# Patient Record
Sex: FEMALE | Race: WHITE | URBAN - METROPOLITAN AREA
[De-identification: names, ages, dates, MRNs, and addresses within clinical notes are randomized per-mention and may not be internally consistent; named-entity substitution may affect disease eponyms.]

---

## 2017-03-07 ENCOUNTER — OUTPATIENT (OUTPATIENT)
Dept: OUTPATIENT SERVICES | Facility: HOSPITAL | Age: 57
LOS: 1 days | Discharge: HOME | End: 2017-03-07

## 2017-03-07 ENCOUNTER — OUTPATIENT (OUTPATIENT)
Dept: OUTPATIENT SERVICES | Facility: HOSPITAL | Age: 57
LOS: 1 days | Discharge: HOME | End: 2017-03-07
Payer: COMMERCIAL

## 2017-03-07 VITALS
WEIGHT: 140.21 LBS | OXYGEN SATURATION: 100 % | SYSTOLIC BLOOD PRESSURE: 129 MMHG | RESPIRATION RATE: 16 BRPM | HEART RATE: 65 BPM | TEMPERATURE: 98 F | HEIGHT: 66 IN | DIASTOLIC BLOOD PRESSURE: 89 MMHG

## 2017-03-07 DIAGNOSIS — Z96.641 PRESENCE OF RIGHT ARTIFICIAL HIP JOINT: Chronic | ICD-10-CM

## 2017-03-07 DIAGNOSIS — M43.16 SPONDYLOLISTHESIS, LUMBAR REGION: ICD-10-CM

## 2017-03-07 DIAGNOSIS — M54.16 RADICULOPATHY, LUMBAR REGION: ICD-10-CM

## 2017-03-07 DIAGNOSIS — Z98.890 OTHER SPECIFIED POSTPROCEDURAL STATES: Chronic | ICD-10-CM

## 2017-03-07 DIAGNOSIS — Z98.1 ARTHRODESIS STATUS: Chronic | ICD-10-CM

## 2017-03-07 DIAGNOSIS — M48.06 SPINAL STENOSIS, LUMBAR REGION: ICD-10-CM

## 2017-03-07 LAB
ANION GAP SERPL CALC-SCNC: 8 MMOL/L — SIGNIFICANT CHANGE UP (ref 5–17)
APPEARANCE UR: CLEAR — SIGNIFICANT CHANGE UP
APTT BLD: 31.7 SEC — SIGNIFICANT CHANGE UP (ref 27.5–37.4)
BASOPHILS # BLD AUTO: 0.1 K/UL — SIGNIFICANT CHANGE UP (ref 0–0.2)
BASOPHILS NFR BLD AUTO: 1.2 % — SIGNIFICANT CHANGE UP (ref 0–2)
BILIRUB UR-MCNC: NEGATIVE — SIGNIFICANT CHANGE UP
BLD GP AB SCN SERPL QL: SIGNIFICANT CHANGE UP
BUN SERPL-MCNC: 12 MG/DL — SIGNIFICANT CHANGE UP (ref 7–23)
CALCIUM SERPL-MCNC: 8.7 MG/DL — SIGNIFICANT CHANGE UP (ref 8.5–10.1)
CHLORIDE SERPL-SCNC: 104 MMOL/L — SIGNIFICANT CHANGE UP (ref 96–108)
CO2 SERPL-SCNC: 29 MMOL/L — SIGNIFICANT CHANGE UP (ref 22–31)
COLOR SPEC: YELLOW — SIGNIFICANT CHANGE UP
CREAT SERPL-MCNC: 0.68 MG/DL — SIGNIFICANT CHANGE UP (ref 0.5–1.3)
DIFF PNL FLD: NEGATIVE — SIGNIFICANT CHANGE UP
EOSINOPHIL # BLD AUTO: 0.1 K/UL — SIGNIFICANT CHANGE UP (ref 0–0.5)
EOSINOPHIL NFR BLD AUTO: 1.7 % — SIGNIFICANT CHANGE UP (ref 0–6)
GLUCOSE SERPL-MCNC: 88 MG/DL — SIGNIFICANT CHANGE UP (ref 70–99)
GLUCOSE UR QL: NEGATIVE MG/DL — SIGNIFICANT CHANGE UP
HCT VFR BLD CALC: 38.7 % — SIGNIFICANT CHANGE UP (ref 34.5–45)
HGB BLD-MCNC: 13.7 G/DL — SIGNIFICANT CHANGE UP (ref 11.5–15.5)
INR BLD: 0.99 RATIO — SIGNIFICANT CHANGE UP (ref 0.88–1.16)
KETONES UR-MCNC: NEGATIVE — SIGNIFICANT CHANGE UP
LEUKOCYTE ESTERASE UR-ACNC: NEGATIVE — SIGNIFICANT CHANGE UP
LYMPHOCYTES # BLD AUTO: 2.6 K/UL — SIGNIFICANT CHANGE UP (ref 1–3.3)
LYMPHOCYTES # BLD AUTO: 37 % — SIGNIFICANT CHANGE UP (ref 13–44)
MCHC RBC-ENTMCNC: 32.8 PG — SIGNIFICANT CHANGE UP (ref 27–34)
MCHC RBC-ENTMCNC: 35.3 GM/DL — SIGNIFICANT CHANGE UP (ref 32–36)
MCV RBC AUTO: 93.1 FL — SIGNIFICANT CHANGE UP (ref 80–100)
MONOCYTES # BLD AUTO: 0.6 K/UL — SIGNIFICANT CHANGE UP (ref 0–0.9)
MONOCYTES NFR BLD AUTO: 8.2 % — SIGNIFICANT CHANGE UP (ref 2–14)
MRSA PCR RESULT.: SIGNIFICANT CHANGE UP
NEUTROPHILS # BLD AUTO: 3.6 K/UL — SIGNIFICANT CHANGE UP (ref 1.8–7.4)
NEUTROPHILS NFR BLD AUTO: 51.8 % — SIGNIFICANT CHANGE UP (ref 43–77)
NITRITE UR-MCNC: NEGATIVE — SIGNIFICANT CHANGE UP
PH UR: 5 — SIGNIFICANT CHANGE UP (ref 4.8–8)
PLATELET # BLD AUTO: 318 K/UL — SIGNIFICANT CHANGE UP (ref 150–400)
POTASSIUM SERPL-MCNC: 3.9 MMOL/L — SIGNIFICANT CHANGE UP (ref 3.5–5.3)
POTASSIUM SERPL-SCNC: 3.9 MMOL/L — SIGNIFICANT CHANGE UP (ref 3.5–5.3)
PROT UR-MCNC: NEGATIVE MG/DL — SIGNIFICANT CHANGE UP
PROTHROM AB SERPL-ACNC: 10.9 SEC — SIGNIFICANT CHANGE UP (ref 10–13.1)
RBC # BLD: 4.16 M/UL — SIGNIFICANT CHANGE UP (ref 3.8–5.2)
RBC # FLD: 11.8 % — SIGNIFICANT CHANGE UP (ref 10.3–14.5)
S AUREUS DNA NOSE QL NAA+PROBE: SIGNIFICANT CHANGE UP
SODIUM SERPL-SCNC: 141 MMOL/L — SIGNIFICANT CHANGE UP (ref 135–145)
SP GR SPEC: 1.01 — SIGNIFICANT CHANGE UP (ref 1.01–1.02)
TYPE + AB SCN PNL BLD: SIGNIFICANT CHANGE UP
UROBILINOGEN FLD QL: NEGATIVE MG/DL — SIGNIFICANT CHANGE UP
WBC # BLD: 7 K/UL — SIGNIFICANT CHANGE UP (ref 3.8–10.5)
WBC # FLD AUTO: 7 K/UL — SIGNIFICANT CHANGE UP (ref 3.8–10.5)

## 2017-03-07 PROCEDURE — 93010 ELECTROCARDIOGRAM REPORT: CPT

## 2017-03-07 NOTE — H&P PST ADULT - HISTORY OF PRESENT ILLNESS
This is a 58 y/o female with no PMH. Pt has had back for "years" she denies any trauma to her back. She has had back surgery in the past (9/2012 and 5/2013) with good results. Has been able to resume jogging and her active life. But in November 2016 she started experiencing back and Right sciatica pain and Left groin pain. Pain radiates to B/L LE and she reports leg cramping in both LEs. Pain at its worst is 7/10, at present is 5/10. She uses tramadol and oxycodone with success. Denies any numbness and tingling

## 2017-03-07 NOTE — H&P PST ADULT - ASSESSMENT
This is a 56 y/o female with lower back pain who is scheduled for a Laminectomy fusion L 3 -L4 with Dr. Pinon    Plan: Patient instructed on     1. NPO post midnight of surgery  2. On the use of EZ sponges  3. Patient instructed to use Mupirocin  4. Patient aware that she needs medical clearance prior to surgery

## 2017-03-10 RX ORDER — CELECOXIB 200 MG/1
200 CAPSULE ORAL ONCE
Qty: 0 | Refills: 0 | Status: COMPLETED | OUTPATIENT
Start: 2017-03-13 | End: 2017-03-13

## 2017-03-10 RX ORDER — HYDROMORPHONE HYDROCHLORIDE 2 MG/ML
30 INJECTION INTRAMUSCULAR; INTRAVENOUS; SUBCUTANEOUS
Qty: 0 | Refills: 0 | Status: DISCONTINUED | OUTPATIENT
Start: 2017-03-13 | End: 2017-03-15

## 2017-03-10 RX ORDER — NALOXONE HYDROCHLORIDE 4 MG/.1ML
0.1 SPRAY NASAL
Qty: 0 | Refills: 0 | Status: DISCONTINUED | OUTPATIENT
Start: 2017-03-13 | End: 2017-03-17

## 2017-03-10 RX ORDER — ONDANSETRON 8 MG/1
4 TABLET, FILM COATED ORAL EVERY 6 HOURS
Qty: 0 | Refills: 0 | Status: DISCONTINUED | OUTPATIENT
Start: 2017-03-13 | End: 2017-03-17

## 2017-03-10 RX ORDER — SODIUM CHLORIDE 9 MG/ML
1000 INJECTION, SOLUTION INTRAVENOUS
Qty: 0 | Refills: 0 | Status: DISCONTINUED | OUTPATIENT
Start: 2017-03-13 | End: 2017-03-17

## 2017-03-10 RX ORDER — SODIUM CHLORIDE 9 MG/ML
3 INJECTION INTRAMUSCULAR; INTRAVENOUS; SUBCUTANEOUS EVERY 8 HOURS
Qty: 0 | Refills: 0 | Status: DISCONTINUED | OUTPATIENT
Start: 2017-03-17 | End: 2017-03-17

## 2017-03-10 RX ORDER — HYDROMORPHONE HYDROCHLORIDE 2 MG/ML
0.5 INJECTION INTRAMUSCULAR; INTRAVENOUS; SUBCUTANEOUS
Qty: 0 | Refills: 0 | Status: DISCONTINUED | OUTPATIENT
Start: 2017-03-13 | End: 2017-03-17

## 2017-03-10 RX ORDER — OXYCODONE HYDROCHLORIDE 5 MG/1
10 TABLET ORAL ONCE
Qty: 0 | Refills: 0 | Status: DISCONTINUED | OUTPATIENT
Start: 2017-03-13 | End: 2017-03-13

## 2017-03-10 RX ORDER — ACETAMINOPHEN 500 MG
975 TABLET ORAL ONCE
Qty: 0 | Refills: 0 | Status: COMPLETED | OUTPATIENT
Start: 2017-03-13 | End: 2017-03-13

## 2017-03-12 ENCOUNTER — RESULT REVIEW (OUTPATIENT)
Age: 57
End: 2017-03-12

## 2017-03-13 ENCOUNTER — INPATIENT (INPATIENT)
Facility: HOSPITAL | Age: 57
LOS: 3 days | Discharge: HOME | End: 2017-03-17
Attending: ORTHOPAEDIC SURGERY | Admitting: NEUROLOGICAL SURGERY
Payer: COMMERCIAL

## 2017-03-13 VITALS
HEIGHT: 66 IN | TEMPERATURE: 98 F | DIASTOLIC BLOOD PRESSURE: 92 MMHG | OXYGEN SATURATION: 100 % | SYSTOLIC BLOOD PRESSURE: 145 MMHG | HEART RATE: 60 BPM | RESPIRATION RATE: 16 BRPM | WEIGHT: 141.32 LBS

## 2017-03-13 DIAGNOSIS — Z98.890 OTHER SPECIFIED POSTPROCEDURAL STATES: Chronic | ICD-10-CM

## 2017-03-13 DIAGNOSIS — Z98.1 ARTHRODESIS STATUS: Chronic | ICD-10-CM

## 2017-03-13 DIAGNOSIS — Z96.641 PRESENCE OF RIGHT ARTIFICIAL HIP JOINT: Chronic | ICD-10-CM

## 2017-03-13 LAB
ANION GAP SERPL CALC-SCNC: 8 MMOL/L — SIGNIFICANT CHANGE UP (ref 5–17)
BASOPHILS # BLD AUTO: 0 K/UL — SIGNIFICANT CHANGE UP (ref 0–0.2)
BASOPHILS NFR BLD AUTO: 0.4 % — SIGNIFICANT CHANGE UP (ref 0–2)
BUN SERPL-MCNC: 11 MG/DL — SIGNIFICANT CHANGE UP (ref 7–23)
CALCIUM SERPL-MCNC: 8.7 MG/DL — SIGNIFICANT CHANGE UP (ref 8.5–10.1)
CHLORIDE SERPL-SCNC: 106 MMOL/L — SIGNIFICANT CHANGE UP (ref 96–108)
CO2 SERPL-SCNC: 28 MMOL/L — SIGNIFICANT CHANGE UP (ref 22–31)
CREAT SERPL-MCNC: 0.78 MG/DL — SIGNIFICANT CHANGE UP (ref 0.5–1.3)
EOSINOPHIL # BLD AUTO: 0 K/UL — SIGNIFICANT CHANGE UP (ref 0–0.5)
EOSINOPHIL NFR BLD AUTO: 0.4 % — SIGNIFICANT CHANGE UP (ref 0–6)
GLUCOSE SERPL-MCNC: 144 MG/DL — HIGH (ref 70–99)
HCT VFR BLD CALC: 37.7 % — SIGNIFICANT CHANGE UP (ref 34.5–45)
HGB BLD-MCNC: 12.8 G/DL — SIGNIFICANT CHANGE UP (ref 11.5–15.5)
LYMPHOCYTES # BLD AUTO: 1.1 K/UL — SIGNIFICANT CHANGE UP (ref 1–3.3)
LYMPHOCYTES # BLD AUTO: 10.7 % — LOW (ref 13–44)
MCHC RBC-ENTMCNC: 31.8 PG — SIGNIFICANT CHANGE UP (ref 27–34)
MCHC RBC-ENTMCNC: 34 GM/DL — SIGNIFICANT CHANGE UP (ref 32–36)
MCV RBC AUTO: 93.5 FL — SIGNIFICANT CHANGE UP (ref 80–100)
MONOCYTES # BLD AUTO: 0.3 K/UL — SIGNIFICANT CHANGE UP (ref 0–0.9)
MONOCYTES NFR BLD AUTO: 2.7 % — SIGNIFICANT CHANGE UP (ref 2–14)
NEUTROPHILS # BLD AUTO: 8.7 K/UL — HIGH (ref 1.8–7.4)
NEUTROPHILS NFR BLD AUTO: 85.8 % — HIGH (ref 43–77)
PLATELET # BLD AUTO: 324 K/UL — SIGNIFICANT CHANGE UP (ref 150–400)
POTASSIUM SERPL-MCNC: 4.3 MMOL/L — SIGNIFICANT CHANGE UP (ref 3.5–5.3)
POTASSIUM SERPL-SCNC: 4.3 MMOL/L — SIGNIFICANT CHANGE UP (ref 3.5–5.3)
RBC # BLD: 4.04 M/UL — SIGNIFICANT CHANGE UP (ref 3.8–5.2)
RBC # FLD: 11.9 % — SIGNIFICANT CHANGE UP (ref 10.3–14.5)
SODIUM SERPL-SCNC: 142 MMOL/L — SIGNIFICANT CHANGE UP (ref 135–145)
WBC # BLD: 10.2 K/UL — SIGNIFICANT CHANGE UP (ref 3.8–10.5)
WBC # FLD AUTO: 10.2 K/UL — SIGNIFICANT CHANGE UP (ref 3.8–10.5)

## 2017-03-13 PROCEDURE — 88304 TISSUE EXAM BY PATHOLOGIST: CPT | Mod: 26

## 2017-03-13 RX ORDER — INFLUENZA VIRUS VACCINE 15; 15; 15; 15 UG/.5ML; UG/.5ML; UG/.5ML; UG/.5ML
0.5 SUSPENSION INTRAMUSCULAR ONCE
Qty: 0 | Refills: 0 | Status: DISCONTINUED | OUTPATIENT
Start: 2017-03-13 | End: 2017-03-17

## 2017-03-13 RX ORDER — HYDROMORPHONE HYDROCHLORIDE 2 MG/ML
1 INJECTION INTRAMUSCULAR; INTRAVENOUS; SUBCUTANEOUS EVERY 6 HOURS
Qty: 0 | Refills: 0 | Status: DISCONTINUED | OUTPATIENT
Start: 2017-03-13 | End: 2017-03-17

## 2017-03-13 RX ORDER — ACETAMINOPHEN 500 MG
650 TABLET ORAL EVERY 6 HOURS
Qty: 0 | Refills: 0 | Status: DISCONTINUED | OUTPATIENT
Start: 2017-03-13 | End: 2017-03-17

## 2017-03-13 RX ORDER — HYDROMORPHONE HYDROCHLORIDE 2 MG/ML
0.5 INJECTION INTRAMUSCULAR; INTRAVENOUS; SUBCUTANEOUS
Qty: 0 | Refills: 0 | Status: DISCONTINUED | OUTPATIENT
Start: 2017-03-13 | End: 2017-03-17

## 2017-03-13 RX ORDER — HYDROMORPHONE HYDROCHLORIDE 2 MG/ML
0.5 INJECTION INTRAMUSCULAR; INTRAVENOUS; SUBCUTANEOUS
Qty: 0 | Refills: 0 | Status: DISCONTINUED | OUTPATIENT
Start: 2017-03-13 | End: 2017-03-15

## 2017-03-13 RX ORDER — SODIUM CHLORIDE 9 MG/ML
1000 INJECTION, SOLUTION INTRAVENOUS
Qty: 0 | Refills: 0 | Status: DISCONTINUED | OUTPATIENT
Start: 2017-03-13 | End: 2017-03-17

## 2017-03-13 RX ORDER — DOCUSATE SODIUM 100 MG
100 CAPSULE ORAL THREE TIMES A DAY
Qty: 0 | Refills: 0 | Status: DISCONTINUED | OUTPATIENT
Start: 2017-03-13 | End: 2017-03-17

## 2017-03-13 RX ORDER — ONDANSETRON 8 MG/1
4 TABLET, FILM COATED ORAL EVERY 4 HOURS
Qty: 0 | Refills: 0 | Status: DISCONTINUED | OUTPATIENT
Start: 2017-03-13 | End: 2017-03-17

## 2017-03-13 RX ORDER — MUPIROCIN 20 MG/G
1 OINTMENT TOPICAL
Qty: 0 | Refills: 0 | Status: DISCONTINUED | OUTPATIENT
Start: 2017-03-13 | End: 2017-03-17

## 2017-03-13 RX ORDER — CYCLOBENZAPRINE HYDROCHLORIDE 10 MG/1
10 TABLET, FILM COATED ORAL EVERY 8 HOURS
Qty: 0 | Refills: 0 | Status: DISCONTINUED | OUTPATIENT
Start: 2017-03-13 | End: 2017-03-17

## 2017-03-13 RX ADMIN — Medication 100 MILLIGRAM(S): at 14:33

## 2017-03-13 RX ADMIN — OXYCODONE HYDROCHLORIDE 10 MILLIGRAM(S): 5 TABLET ORAL at 07:20

## 2017-03-13 RX ADMIN — SODIUM CHLORIDE 100 MILLILITER(S): 9 INJECTION, SOLUTION INTRAVENOUS at 10:47

## 2017-03-13 RX ADMIN — HYDROMORPHONE HYDROCHLORIDE 30 MILLILITER(S): 2 INJECTION INTRAMUSCULAR; INTRAVENOUS; SUBCUTANEOUS at 10:31

## 2017-03-13 RX ADMIN — Medication 975 MILLIGRAM(S): at 07:19

## 2017-03-13 RX ADMIN — HYDROMORPHONE HYDROCHLORIDE 0.5 MILLIGRAM(S): 2 INJECTION INTRAMUSCULAR; INTRAVENOUS; SUBCUTANEOUS at 10:54

## 2017-03-13 RX ADMIN — Medication 100 MILLIGRAM(S): at 21:35

## 2017-03-13 RX ADMIN — Medication 100 MILLIGRAM(S): at 14:16

## 2017-03-13 RX ADMIN — HYDROMORPHONE HYDROCHLORIDE 0.5 MILLIGRAM(S): 2 INJECTION INTRAMUSCULAR; INTRAVENOUS; SUBCUTANEOUS at 10:38

## 2017-03-13 RX ADMIN — SODIUM CHLORIDE 100 MILLILITER(S): 9 INJECTION, SOLUTION INTRAVENOUS at 21:36

## 2017-03-13 RX ADMIN — CYCLOBENZAPRINE HYDROCHLORIDE 10 MILLIGRAM(S): 10 TABLET, FILM COATED ORAL at 21:35

## 2017-03-13 RX ADMIN — CYCLOBENZAPRINE HYDROCHLORIDE 10 MILLIGRAM(S): 10 TABLET, FILM COATED ORAL at 14:16

## 2017-03-13 RX ADMIN — Medication 100 MILLIGRAM(S): at 20:33

## 2017-03-13 RX ADMIN — CELECOXIB 200 MILLIGRAM(S): 200 CAPSULE ORAL at 07:20

## 2017-03-13 NOTE — PATIENT PROFILE ADULT. - PSH
S/P arthroscopy of right knee  7/2016 and done prior about 10 years ago  S/P cervical discectomy  5/2013  S/P hip replacement, right  2012  S/P lumbar fusion  9/2012

## 2017-03-13 NOTE — BRIEF OPERATIVE NOTE - PROCEDURE
Lumbar fusion of the anterior column, posterior technique  03/13/2017  laminectomy/fusion/PLIF L3/4, exploration of fusion  Active  HDEWAL

## 2017-03-14 DIAGNOSIS — Z29.9 ENCOUNTER FOR PROPHYLACTIC MEASURES, UNSPECIFIED: ICD-10-CM

## 2017-03-14 DIAGNOSIS — I95.9 HYPOTENSION, UNSPECIFIED: ICD-10-CM

## 2017-03-14 DIAGNOSIS — R50.82 POSTPROCEDURAL FEVER: ICD-10-CM

## 2017-03-14 DIAGNOSIS — Z98.1 ARTHRODESIS STATUS: ICD-10-CM

## 2017-03-14 LAB
ANION GAP SERPL CALC-SCNC: 6 MMOL/L — SIGNIFICANT CHANGE UP (ref 5–17)
ANION GAP SERPL CALC-SCNC: 7 MMOL/L — SIGNIFICANT CHANGE UP (ref 5–17)
APPEARANCE UR: CLEAR — SIGNIFICANT CHANGE UP
APTT BLD: 29.5 SEC — SIGNIFICANT CHANGE UP (ref 27.5–37.4)
BACTERIA # UR AUTO: (no result)
BASOPHILS # BLD AUTO: 0.1 K/UL — SIGNIFICANT CHANGE UP (ref 0–0.2)
BASOPHILS NFR BLD AUTO: 0.6 % — SIGNIFICANT CHANGE UP (ref 0–2)
BILIRUB UR-MCNC: NEGATIVE — SIGNIFICANT CHANGE UP
BUN SERPL-MCNC: 6 MG/DL — LOW (ref 7–23)
BUN SERPL-MCNC: 9 MG/DL — SIGNIFICANT CHANGE UP (ref 7–23)
CALCIUM SERPL-MCNC: 7.9 MG/DL — LOW (ref 8.5–10.1)
CALCIUM SERPL-MCNC: 8.3 MG/DL — LOW (ref 8.5–10.1)
CHLORIDE SERPL-SCNC: 104 MMOL/L — SIGNIFICANT CHANGE UP (ref 96–108)
CHLORIDE SERPL-SCNC: 105 MMOL/L — SIGNIFICANT CHANGE UP (ref 96–108)
CK SERPL-CCNC: 391 U/L — HIGH (ref 26–192)
CO2 SERPL-SCNC: 31 MMOL/L — SIGNIFICANT CHANGE UP (ref 22–31)
CO2 SERPL-SCNC: 32 MMOL/L — HIGH (ref 22–31)
COLOR SPEC: YELLOW — SIGNIFICANT CHANGE UP
CREAT SERPL-MCNC: 0.62 MG/DL — SIGNIFICANT CHANGE UP (ref 0.5–1.3)
CREAT SERPL-MCNC: 0.75 MG/DL — SIGNIFICANT CHANGE UP (ref 0.5–1.3)
DIFF PNL FLD: (no result)
EOSINOPHIL # BLD AUTO: 0 K/UL — SIGNIFICANT CHANGE UP (ref 0–0.5)
EOSINOPHIL NFR BLD AUTO: 0.2 % — SIGNIFICANT CHANGE UP (ref 0–6)
EPI CELLS # UR: SIGNIFICANT CHANGE UP
GLUCOSE SERPL-MCNC: 112 MG/DL — HIGH (ref 70–99)
GLUCOSE SERPL-MCNC: 99 MG/DL — SIGNIFICANT CHANGE UP (ref 70–99)
GLUCOSE UR QL: NEGATIVE MG/DL — SIGNIFICANT CHANGE UP
HCT VFR BLD CALC: 31.4 % — LOW (ref 34.5–45)
HCT VFR BLD CALC: 32.3 % — LOW (ref 34.5–45)
HGB BLD-MCNC: 10.7 G/DL — LOW (ref 11.5–15.5)
HGB BLD-MCNC: 10.9 G/DL — LOW (ref 11.5–15.5)
INR BLD: 1.08 RATIO — SIGNIFICANT CHANGE UP (ref 0.88–1.16)
KETONES UR-MCNC: NEGATIVE — SIGNIFICANT CHANGE UP
LEUKOCYTE ESTERASE UR-ACNC: (no result)
LYMPHOCYTES # BLD AUTO: 2.4 K/UL — SIGNIFICANT CHANGE UP (ref 1–3.3)
LYMPHOCYTES # BLD AUTO: 21.3 % — SIGNIFICANT CHANGE UP (ref 13–44)
MAGNESIUM SERPL-MCNC: 1.6 MG/DL — LOW (ref 1.8–2.4)
MCHC RBC-ENTMCNC: 31.3 PG — SIGNIFICANT CHANGE UP (ref 27–34)
MCHC RBC-ENTMCNC: 31.6 PG — SIGNIFICANT CHANGE UP (ref 27–34)
MCHC RBC-ENTMCNC: 33.8 GM/DL — SIGNIFICANT CHANGE UP (ref 32–36)
MCHC RBC-ENTMCNC: 33.9 GM/DL — SIGNIFICANT CHANGE UP (ref 32–36)
MCV RBC AUTO: 92.7 FL — SIGNIFICANT CHANGE UP (ref 80–100)
MCV RBC AUTO: 93.1 FL — SIGNIFICANT CHANGE UP (ref 80–100)
MONOCYTES # BLD AUTO: 1.1 K/UL — HIGH (ref 0–0.9)
MONOCYTES NFR BLD AUTO: 9.5 % — SIGNIFICANT CHANGE UP (ref 2–14)
NEUTROPHILS # BLD AUTO: 7.6 K/UL — HIGH (ref 1.8–7.4)
NEUTROPHILS NFR BLD AUTO: 68.4 % — SIGNIFICANT CHANGE UP (ref 43–77)
NITRITE UR-MCNC: NEGATIVE — SIGNIFICANT CHANGE UP
PH UR: 6 — SIGNIFICANT CHANGE UP (ref 4.8–8)
PLATELET # BLD AUTO: 272 K/UL — SIGNIFICANT CHANGE UP (ref 150–400)
PLATELET # BLD AUTO: 295 K/UL — SIGNIFICANT CHANGE UP (ref 150–400)
POTASSIUM SERPL-MCNC: 3.9 MMOL/L — SIGNIFICANT CHANGE UP (ref 3.5–5.3)
POTASSIUM SERPL-MCNC: 4.4 MMOL/L — SIGNIFICANT CHANGE UP (ref 3.5–5.3)
POTASSIUM SERPL-SCNC: 3.9 MMOL/L — SIGNIFICANT CHANGE UP (ref 3.5–5.3)
POTASSIUM SERPL-SCNC: 4.4 MMOL/L — SIGNIFICANT CHANGE UP (ref 3.5–5.3)
PROT UR-MCNC: NEGATIVE MG/DL — SIGNIFICANT CHANGE UP
PROTHROM AB SERPL-ACNC: 11.9 SEC — SIGNIFICANT CHANGE UP (ref 10–13.1)
RBC # BLD: 3.38 M/UL — LOW (ref 3.8–5.2)
RBC # BLD: 3.48 M/UL — LOW (ref 3.8–5.2)
RBC # FLD: 11.8 % — SIGNIFICANT CHANGE UP (ref 10.3–14.5)
RBC # FLD: 12.1 % — SIGNIFICANT CHANGE UP (ref 10.3–14.5)
RBC CASTS # UR COMP ASSIST: (no result) /HPF (ref 0–4)
SODIUM SERPL-SCNC: 141 MMOL/L — SIGNIFICANT CHANGE UP (ref 135–145)
SODIUM SERPL-SCNC: 144 MMOL/L — SIGNIFICANT CHANGE UP (ref 135–145)
SP GR SPEC: 1.01 — SIGNIFICANT CHANGE UP (ref 1.01–1.02)
TROPONIN I SERPL-MCNC: <0.015 NG/ML — SIGNIFICANT CHANGE UP (ref 0.01–0.04)
UROBILINOGEN FLD QL: 1 MG/DL
WBC # BLD: 11 K/UL — HIGH (ref 3.8–10.5)
WBC # BLD: 11.2 K/UL — HIGH (ref 3.8–10.5)
WBC # FLD AUTO: 11 K/UL — HIGH (ref 3.8–10.5)
WBC # FLD AUTO: 11.2 K/UL — HIGH (ref 3.8–10.5)
WBC UR QL: SIGNIFICANT CHANGE UP

## 2017-03-14 PROCEDURE — 93010 ELECTROCARDIOGRAM REPORT: CPT

## 2017-03-14 PROCEDURE — 71010: CPT | Mod: 26

## 2017-03-14 RX ORDER — MAGNESIUM OXIDE 400 MG ORAL TABLET 241.3 MG
400 TABLET ORAL EVERY 12 HOURS
Qty: 0 | Refills: 0 | Status: COMPLETED | OUTPATIENT
Start: 2017-03-14 | End: 2017-03-16

## 2017-03-14 RX ORDER — IBUPROFEN 200 MG
400 TABLET ORAL ONCE
Qty: 0 | Refills: 0 | Status: COMPLETED | OUTPATIENT
Start: 2017-03-14 | End: 2017-03-14

## 2017-03-14 RX ORDER — SODIUM CHLORIDE 9 MG/ML
500 INJECTION INTRAMUSCULAR; INTRAVENOUS; SUBCUTANEOUS ONCE
Qty: 0 | Refills: 0 | Status: COMPLETED | OUTPATIENT
Start: 2017-03-14 | End: 2017-03-14

## 2017-03-14 RX ADMIN — MUPIROCIN 1 APPLICATION(S): 20 OINTMENT TOPICAL at 05:23

## 2017-03-14 RX ADMIN — MUPIROCIN 1 APPLICATION(S): 20 OINTMENT TOPICAL at 17:53

## 2017-03-14 RX ADMIN — Medication 100 MILLIGRAM(S): at 15:07

## 2017-03-14 RX ADMIN — CYCLOBENZAPRINE HYDROCHLORIDE 10 MILLIGRAM(S): 10 TABLET, FILM COATED ORAL at 05:23

## 2017-03-14 RX ADMIN — Medication 100 MILLIGRAM(S): at 21:25

## 2017-03-14 RX ADMIN — SODIUM CHLORIDE 1500 MILLILITER(S): 9 INJECTION INTRAMUSCULAR; INTRAVENOUS; SUBCUTANEOUS at 09:23

## 2017-03-14 RX ADMIN — Medication 100 MILLIGRAM(S): at 05:22

## 2017-03-14 RX ADMIN — SODIUM CHLORIDE 75 MILLILITER(S): 9 INJECTION, SOLUTION INTRAVENOUS at 05:22

## 2017-03-14 RX ADMIN — HYDROMORPHONE HYDROCHLORIDE 0.5 MILLIGRAM(S): 2 INJECTION INTRAMUSCULAR; INTRAVENOUS; SUBCUTANEOUS at 10:34

## 2017-03-14 RX ADMIN — CYCLOBENZAPRINE HYDROCHLORIDE 10 MILLIGRAM(S): 10 TABLET, FILM COATED ORAL at 15:07

## 2017-03-14 RX ADMIN — Medication 400 MILLIGRAM(S): at 21:25

## 2017-03-14 RX ADMIN — Medication 650 MILLIGRAM(S): at 16:44

## 2017-03-14 RX ADMIN — MAGNESIUM OXIDE 400 MG ORAL TABLET 400 MILLIGRAM(S): 241.3 TABLET ORAL at 22:09

## 2017-03-14 RX ADMIN — Medication 400 MILLIGRAM(S): at 22:09

## 2017-03-14 RX ADMIN — CYCLOBENZAPRINE HYDROCHLORIDE 10 MILLIGRAM(S): 10 TABLET, FILM COATED ORAL at 21:25

## 2017-03-14 NOTE — CONSULT NOTE ADULT - PROBLEM SELECTOR RECOMMENDATION 3
- Likely 2/2 to Dilaudid PCA  - At present BP: 110/65 mm hg, pt is receiving IVF LR  - c/w IVF and Monitor BP

## 2017-03-14 NOTE — CONSULT NOTE ADULT - ATTENDING COMMENTS
I have examined the patient and confirmed the essential components of the history, physical examination, diagnosis, and treatment plan. I agree with the patient's care as documented by the resident and amended herein by me. See resident's note for complete details of service.

## 2017-03-14 NOTE — CONSULT NOTE ADULT - PROBLEM SELECTOR RECOMMENDATION 9
# S/p Laminectomy/fusion/PLIF L3- L4  - Manage as per primary team  - Post Op IV Abx, pain meds, AC as per primary team

## 2017-03-14 NOTE — PROGRESS NOTE ADULT - SUBJECTIVE AND OBJECTIVE BOX
PMD: Dr. Flori Beck  	  56 yo F with PMH significant for chronic back pain now s/p laminectomy lumbar fusion of L3-L4 POD #1. Medicine consult called for eval. of post op fever and hypotension. Pt was seen and examined at 2N, resting comfortably in bed. Pt is c/o fever and productive cough x 1 day. Pt is also c/o chest heaviness and SOB when she coughs. Denies palpitations. Denies abdominal pain, N/V/D/C.     Pt denies any PMH of HTN, HLD, DM or CAD. Pt is on PCA Dilaudid and SBP:  mm hg. Pt is receiving IVF  ml/hr. RN also reported temp: 101.7 F. Stat labs CBC/BMP/UA/CE/BCx , EKG and CXR ordered.      # PMH:  - Back pain.    # PSH:    - S/P arthroscopy of right knee  7/2016 and done prior about 10 years ago  - S/P cervical discectomy  5/2013  - S/P hip replacement, right  2012  - S/P lumbar fusion  9/2012.    # Family H/o:  -  No pertinent family h/o    # Social h/o:  - Denies smoking  - Denies drinking alcohol    # ROS: + back pain, fever , cough, chest pain and SOB on cough, rest of the ROS negative    # Physical exam:  # Vitals:   - Temp: 101.7 F  - BP: 110/65 mm hg  - HR: 75  - O2 sat: 96 % at RA  - RR: 16    General: NAD  Lungs: CTA b/l  CVS: S1 S2 +  Abdomen: soft, NT, ND, BS +  Extremities No edema  Musculoskeletal: LE and UE ROM intact, Could not examined back/spine as pt is in pain s/p lumbar fusion and refused to move.  Skin: no rash  Neuro: AAO x 3, gross motor and sensational intact  Psychiatry: normal mood    # LABs:  - Stat labs ordered - results pending  - AM labs:   - WBC: 11.2  - BUN/Cr: 6/0.62    # Stat CXR pending    # EKG: NSR, VR:86, Qt/Qtc: 336/402 ms      A/P:    #  Fever s/p laminectomy/Fusion/PLIF L3-L4 , POD #1   - Stat CBC/BMP/Mg /Bcx /CXR/EKG   - UA  - Will start IV Abx  - Incentive Spirometry  - Cough meds PRN  - Tylenol PRN for pain and fever    # Hypotension  - Likely 2/2 to Dilaudid PCA  - At present BP: 110/65 mm hg, pt is receiving IVF LR  - c/w IVF and Monitor BP    # S/p Laminectomy/fusion/PLIF L3- L4  - Manage as per primary team  - IV Abx, pain meds, AC as per primary team    Case d/w  PMD: Dr. Flori Beck  	  58 yo F with PMH significant for chronic back pain now s/p laminectomy lumbar fusion of L3-L4 POD #1. Medicine consult called for eval. of post op fever and hypotension. Pt was seen and examined at 2N, resting comfortably in bed. Pt is c/o fever and productive cough x 1 day. Pt is also c/o chest heaviness and SOB when she coughs. c/o nasal congestion. Denies palpitations. Denies abdominal pain, N/V/D/C.     Pt denies any PMH of HTN, HLD, DM or CAD. Pt is on PCA Dilaudid and SBP:  mm hg. Pt is receiving IVF  ml/hr. RN also reported temp: 101.7 F. Stat labs CBC/BMP/UA/CE/BCx , EKG and CXR ordered.      # PMH:  - Back pain.    # PSH:    - S/P arthroscopy of right knee  7/2016 and done prior about 10 years ago  - S/P cervical discectomy  5/2013  - S/P hip replacement, right  2012  - S/P lumbar fusion  9/2012.    # Family H/o:  -  No pertinent family h/o    # Social h/o:  - Denies smoking  - Denies drinking alcohol    # ROS: + back pain, fever , cough, chest pain and SOB on cough, rest of the ROS negative    # Physical exam:  # Vitals:   - Temp: 101.7 F  - BP: 110/65 mm hg  - HR: 75  - O2 sat: 96 % at RA  - RR: 16    General: NAD  Lungs: CTA b/l  CVS: S1 S2 +  Abdomen: soft, NT, ND, BS +  Extremities No edema  Musculoskeletal: LE and UE ROM intact, Could not examined back/spine as pt is in pain s/p lumbar fusion and refused to move.  Skin: no rash  Neuro: AAO x 3, gross motor and sensational intact  Psychiatry: normal mood    # LABs:  - Stat labs ordered - results pending  - AM labs:   - WBC: 11.2  - BUN/Cr: 6/0.62    # Stat CXR pending    # EKG: NSR, VR:86, Qt/Qtc: 336/402 ms      A/P:    #  Fever s/p laminectomy/Fusion/PLIF L3-L4 , POD #1   - Stat CBC/BMP/Mg /Bcx /CXR/EKG   - UA  - Will start IV Abx ?  - Incentive Spirometry  - Cough meds PRN  - Tylenol PRN for pain and fever    # Hypotension  - Likely 2/2 to Dilaudid PCA  - At present BP: 110/65 mm hg, pt is receiving IVF LR  - c/w IVF and Monitor BP    # S/p Laminectomy/fusion/PLIF L3- L4  - Manage as per primary team  - IV Abx, pain meds, AC as per primary team    Case d/w

## 2017-03-14 NOTE — PROVIDER CONTACT NOTE (OTHER) - RECOMMENDATIONS
may need a bolus; awaiting Dr. Madrigal's return phone call; day shift RN notified and will carry out further instructions.

## 2017-03-14 NOTE — CONSULT NOTE ADULT - ASSESSMENT
58 yo F with PMH significant for chronic back pain now s/p laminectomy lumbar fusion of L3-L4 POD #1. Medicine consult called for eval. of post op fever and hypotension

## 2017-03-14 NOTE — CONSULT NOTE ADULT - PROBLEM SELECTOR RECOMMENDATION 2
#  Fever s/p laminectomy/Fusion/PLIF L3-L4 , POD #1   - Stat CBC/BMP/Mg /Bcx /CXR/EKG   - UA  - Will start IV Abx ?  - Incentive Spirometry  - Cough meds PRN  - Tylenol PRN for pain and fever #  Fever s/p laminectomy/Fusion/PLIF L3-L4 , POD #1   - Stat CBC/BMP/Mg /Bcx /CXR/EKG   - UA  - RVP panel stat - isolation RVP Panel pending  - Leucocytosis without left shift, likely inflammatory - Monitor WBC count  - Incentive Spirometry  - Cough meds PRN  - Tylenol PRN for pain and fever #  Fever s/p laminectomy/Fusion/PLIF L3-L4 , POD #1   - Stat CBC/BMP/Bcx /CXR  - RVP panel : negative  - Leucocytosis, fever and cough - likely PNA, will start IV Cefepime and Vanco  - ID consult  - Incentive Spirometry  - Cough meds PRN  - Tylenol PRN for pain and fever  - Monitor CBC #  Fever s/p laminectomy/Fusion/PLIF L3-L4 , POD #1   # Likely pos- op PNA  - RVP panel : negative  - Leucocytosis, fever and cough - likely PNA, will start IV Cefepime and Vanco  - ID consult  - Incentive Spirometry  - Cough meds PRN  - Tylenol PRN for pain and fever  - Monitor CBC

## 2017-03-15 LAB
ANION GAP SERPL CALC-SCNC: 7 MMOL/L — SIGNIFICANT CHANGE UP (ref 5–17)
BASOPHILS # BLD AUTO: 0.1 K/UL — SIGNIFICANT CHANGE UP (ref 0–0.2)
BASOPHILS NFR BLD AUTO: 0.8 % — SIGNIFICANT CHANGE UP (ref 0–2)
BUN SERPL-MCNC: 9 MG/DL — SIGNIFICANT CHANGE UP (ref 7–23)
CALCIUM SERPL-MCNC: 8.1 MG/DL — LOW (ref 8.5–10.1)
CHLORIDE SERPL-SCNC: 106 MMOL/L — SIGNIFICANT CHANGE UP (ref 96–108)
CK SERPL-CCNC: 418 U/L — HIGH (ref 26–192)
CO2 SERPL-SCNC: 30 MMOL/L — SIGNIFICANT CHANGE UP (ref 22–31)
CREAT SERPL-MCNC: 0.66 MG/DL — SIGNIFICANT CHANGE UP (ref 0.5–1.3)
EOSINOPHIL # BLD AUTO: 0.2 K/UL — SIGNIFICANT CHANGE UP (ref 0–0.5)
EOSINOPHIL NFR BLD AUTO: 1.7 % — SIGNIFICANT CHANGE UP (ref 0–6)
GLUCOSE SERPL-MCNC: 88 MG/DL — SIGNIFICANT CHANGE UP (ref 70–99)
HCT VFR BLD CALC: 31.6 % — LOW (ref 34.5–45)
HGB BLD-MCNC: 10.5 G/DL — LOW (ref 11.5–15.5)
LACTATE SERPL-SCNC: 1.7 MMOL/L — SIGNIFICANT CHANGE UP (ref 0.7–2)
LYMPHOCYTES # BLD AUTO: 2.5 K/UL — SIGNIFICANT CHANGE UP (ref 1–3.3)
LYMPHOCYTES # BLD AUTO: 22.6 % — SIGNIFICANT CHANGE UP (ref 13–44)
MCHC RBC-ENTMCNC: 31.6 PG — SIGNIFICANT CHANGE UP (ref 27–34)
MCHC RBC-ENTMCNC: 33.3 GM/DL — SIGNIFICANT CHANGE UP (ref 32–36)
MCV RBC AUTO: 94.8 FL — SIGNIFICANT CHANGE UP (ref 80–100)
MONOCYTES # BLD AUTO: 1 K/UL — HIGH (ref 0–0.9)
MONOCYTES NFR BLD AUTO: 9.4 % — SIGNIFICANT CHANGE UP (ref 2–14)
NEUTROPHILS # BLD AUTO: 7.2 K/UL — SIGNIFICANT CHANGE UP (ref 1.8–7.4)
NEUTROPHILS NFR BLD AUTO: 65.6 % — SIGNIFICANT CHANGE UP (ref 43–77)
PLATELET # BLD AUTO: 248 K/UL — SIGNIFICANT CHANGE UP (ref 150–400)
POTASSIUM SERPL-MCNC: 4.2 MMOL/L — SIGNIFICANT CHANGE UP (ref 3.5–5.3)
POTASSIUM SERPL-SCNC: 4.2 MMOL/L — SIGNIFICANT CHANGE UP (ref 3.5–5.3)
RAPID RVP RESULT: SIGNIFICANT CHANGE UP
RBC # BLD: 3.33 M/UL — LOW (ref 3.8–5.2)
RBC # FLD: 12.3 % — SIGNIFICANT CHANGE UP (ref 10.3–14.5)
SODIUM SERPL-SCNC: 143 MMOL/L — SIGNIFICANT CHANGE UP (ref 135–145)
SURGICAL PATHOLOGY FINAL REPORT - CH: SIGNIFICANT CHANGE UP
TROPONIN I SERPL-MCNC: <0.015 NG/ML — SIGNIFICANT CHANGE UP (ref 0.01–0.04)
WBC # BLD: 11 K/UL — HIGH (ref 3.8–10.5)
WBC # FLD AUTO: 11 K/UL — HIGH (ref 3.8–10.5)

## 2017-03-15 PROCEDURE — 93010 ELECTROCARDIOGRAM REPORT: CPT

## 2017-03-15 RX ORDER — CEFEPIME 1 G/1
2000 INJECTION, POWDER, FOR SOLUTION INTRAMUSCULAR; INTRAVENOUS ONCE
Qty: 0 | Refills: 0 | Status: COMPLETED | OUTPATIENT
Start: 2017-03-15 | End: 2017-03-15

## 2017-03-15 RX ORDER — VANCOMYCIN HCL 1 G
1000 VIAL (EA) INTRAVENOUS ONCE
Qty: 0 | Refills: 0 | Status: COMPLETED | OUTPATIENT
Start: 2017-03-15 | End: 2017-03-15

## 2017-03-15 RX ORDER — CEFEPIME 1 G/1
2000 INJECTION, POWDER, FOR SOLUTION INTRAMUSCULAR; INTRAVENOUS EVERY 8 HOURS
Qty: 0 | Refills: 0 | Status: DISCONTINUED | OUTPATIENT
Start: 2017-03-15 | End: 2017-03-17

## 2017-03-15 RX ORDER — CEFEPIME 1 G/1
INJECTION, POWDER, FOR SOLUTION INTRAMUSCULAR; INTRAVENOUS
Qty: 0 | Refills: 0 | Status: DISCONTINUED | OUTPATIENT
Start: 2017-03-15 | End: 2017-03-17

## 2017-03-15 RX ORDER — SODIUM CHLORIDE 9 MG/ML
500 INJECTION INTRAMUSCULAR; INTRAVENOUS; SUBCUTANEOUS
Qty: 0 | Refills: 0 | Status: DISCONTINUED | OUTPATIENT
Start: 2017-03-15 | End: 2017-03-17

## 2017-03-15 RX ADMIN — CEFEPIME 100 MILLIGRAM(S): 1 INJECTION, POWDER, FOR SOLUTION INTRAMUSCULAR; INTRAVENOUS at 03:36

## 2017-03-15 RX ADMIN — Medication 100 MILLIGRAM(S): at 14:35

## 2017-03-15 RX ADMIN — MUPIROCIN 1 APPLICATION(S): 20 OINTMENT TOPICAL at 06:01

## 2017-03-15 RX ADMIN — SODIUM CHLORIDE 500 MILLILITER(S): 9 INJECTION INTRAMUSCULAR; INTRAVENOUS; SUBCUTANEOUS at 00:25

## 2017-03-15 RX ADMIN — Medication 100 MILLIGRAM(S): at 06:01

## 2017-03-15 RX ADMIN — HYDROMORPHONE HYDROCHLORIDE 1 MILLIGRAM(S): 2 INJECTION INTRAMUSCULAR; INTRAVENOUS; SUBCUTANEOUS at 14:23

## 2017-03-15 RX ADMIN — MAGNESIUM OXIDE 400 MG ORAL TABLET 400 MILLIGRAM(S): 241.3 TABLET ORAL at 18:06

## 2017-03-15 RX ADMIN — CEFEPIME 100 MILLIGRAM(S): 1 INJECTION, POWDER, FOR SOLUTION INTRAMUSCULAR; INTRAVENOUS at 09:07

## 2017-03-15 RX ADMIN — MUPIROCIN 1 APPLICATION(S): 20 OINTMENT TOPICAL at 18:06

## 2017-03-15 RX ADMIN — CYCLOBENZAPRINE HYDROCHLORIDE 10 MILLIGRAM(S): 10 TABLET, FILM COATED ORAL at 21:36

## 2017-03-15 RX ADMIN — Medication 250 MILLIGRAM(S): at 04:01

## 2017-03-15 RX ADMIN — CYCLOBENZAPRINE HYDROCHLORIDE 10 MILLIGRAM(S): 10 TABLET, FILM COATED ORAL at 06:01

## 2017-03-15 RX ADMIN — MAGNESIUM OXIDE 400 MG ORAL TABLET 400 MILLIGRAM(S): 241.3 TABLET ORAL at 06:01

## 2017-03-15 RX ADMIN — CYCLOBENZAPRINE HYDROCHLORIDE 10 MILLIGRAM(S): 10 TABLET, FILM COATED ORAL at 14:35

## 2017-03-15 RX ADMIN — CEFEPIME 100 MILLIGRAM(S): 1 INJECTION, POWDER, FOR SOLUTION INTRAMUSCULAR; INTRAVENOUS at 15:28

## 2017-03-15 RX ADMIN — CEFEPIME 100 MILLIGRAM(S): 1 INJECTION, POWDER, FOR SOLUTION INTRAMUSCULAR; INTRAVENOUS at 21:36

## 2017-03-15 RX ADMIN — Medication 100 MILLIGRAM(S): at 21:36

## 2017-03-15 NOTE — CONSULT NOTE ADULT - SUBJECTIVE AND OBJECTIVE BOX
PMD: Dr. Flori Beck  	  58 yo F with PMH significant for chronic back pain now s/p laminectomy lumbar fusion of L3-L4 POD #1. Medicine consult called for eval. of post op fever and hypotension. Pt was seen and examined at 2N, resting comfortably in bed. Pt is c/o fever and productive cough x 1 day. Pt is also c/o chest heaviness and SOB when she coughs. c/o nasal congestion. Denies palpitations. Denies abdominal pain, N/V/D/C.     Pt denies any PMH of HTN, HLD, DM or CAD. Pt is on PCA Dilaudid and SBP:  mm hg. Pt is receiving IVF  ml/hr. RN also reported temp: 101.7 F. Stat labs CBC/BMP/UA/CE/BCx , EKG and CXR ordered.      # PMH:  - Back pain.    # PSH:    - S/P arthroscopy of right knee  7/2016 and done prior about 10 years ago  - S/P cervical discectomy  5/2013  - S/P hip replacement, right  2012  - S/P lumbar fusion  9/2012.    # Family H/o:  -  No pertinent family h/o    # Social h/o:  - Denies smoking  - Denies drinking alcohol    # ROS: + back pain, fever , cough, chest pain and SOB on cough, rest of the ROS negative    # Physical exam:  # Vitals:   - Temp: 101.7 F  - BP: 110/65 mm hg  - HR: 75  - O2 sat: 96 % at RA  - RR: 16    General: NAD  Lungs: CTA b/l  CVS: S1 S2 +  Abdomen: soft, NT, ND, BS +  Extremities No edema  Musculoskeletal: LE and UE ROM intact, Could not examined back/spine as pt is in pain s/p lumbar fusion and refused to move.  Skin: no rash  Neuro: AAO x 3, gross motor and sensational intact  Psychiatry: normal mood    # LABs:  - Stat labs ordered - results pending  - AM labs:   - WBC: 11.2  - BUN/Cr: 6/0.62    # Stat CXR pending    # EKG: NSR, VR:86, Qt/Qtc: 336/402 msPMD: Dr. Flori Beck  	  58 yo F with PMH significant for chronic back pain now s/p laminectomy lumbar fusion of L3-L4 POD #1. Medicine consult called for eval. of post op fever and hypotension. Pt was seen and examined at 2N, resting comfortably in bed. Pt is c/o fever and productive cough x 1 day. Pt is also c/o chest heaviness and SOB when she coughs. Denies palpitations. Denies abdominal pain, N/V/D/C.     Pt denies any PMH of HTN, HLD, DM or CAD. Pt is on PCA Dilaudid and SBP:  mm hg. Pt is receiving IVF  ml/hr. RN also reported temp: 101.7 F. Stat labs CBC/BMP/UA/CE/BCx , EKG and CXR ordered.      # PMH:  - Back pain.    # PSH:    - S/P arthroscopy of right knee  7/2016 and done prior about 10 years ago  - S/P cervical discectomy  5/2013  - S/P hip replacement, right  2012  - S/P lumbar fusion  9/2012.    # Family H/o:  -  No pertinent family h/o    # Social h/o:  - Denies smoking  - Denies drinking alcohol    # ROS: + back pain, fever , cough, chest pain and SOB on cough, rest of the ROS negative    # Physical exam:  # Vitals:   - Temp: 101.7 F  - BP: 110/65 mm hg  - HR: 75  - O2 sat: 96 % at RA  - RR: 16    General: NAD  Lungs: CTA b/l  CVS: S1 S2 +  Abdomen: soft, NT, ND, BS +  Extremities No edema  Musculoskeletal: LE and UE ROM intact, Could not examined back/spine as pt is in pain s/p lumbar fusion and refused to move.  Skin: no rash  Neuro: AAO x 3, gross motor and sensational intact  Psychiatry: normal mood    # LABs:  - Stat labs ordered - results pending  - AM labs:   - WBC: 11.2  - BUN/Cr: 6/0.62    # Stat CXR pending    # EKG: NSR, VR:86, Qt/Qtc: 336/402 ms      A/P:    #  Fever s/p laminectomy/Fusion/PLIF L3-L4 , POD #1   - Stat CBC/BMP/Mg /Bcx /CXR/EKG   - UA  - Will start IV Abx  - Incentive Spirometry  - Cough meds PRN  - Tylenol PRN for pain and fever    # Hypotension  - Likely 2/2 to Dilaudid PCA  - At present BP: 110/65 mm hg, pt is receiving IVF LR  - c/w IVF and Monitor BP    # S/p Laminectomy/fusion/PLIF L3- L4  - Manage as per primary team  - IV Abx, pain meds, AC as per primary team    Case d/w 
HPI:Patient is 57 year old female with past medical history prior laminectomy now admitted on 3/13 and underwent elective L3-L4 laminectomy. Post op patient on 3/14 had fever and low blood pressure and associated cough, mild shortness of breath; no other specific complaints; was not ill prior to surgery.      PMH: as above  PSH: as above and arthrscopic surgery, cervical discectoym, s/p right hip replacement  Meds: per reconcilation sheet, noted below  MEDICATIONS  (STANDING):  lactated ringers. 1000milliLiter(s) IV Continuous <Continuous>  HYDROmorphone PCA (1 mG/mL) 30milliLiter(s) PCA Continuous PCA Continuous  influenza   Vaccine 0.5milliLiter(s) IntraMuscular once  lactated ringers. 1000milliLiter(s) IV Continuous <Continuous>  cyclobenzaprine 10milliGRAM(s) Oral every 8 hours  docusate sodium 100milliGRAM(s) Oral three times a day  mupirocin 2% Ointment 1Application(s) Topical two times a day  magnesium oxide 400milliGRAM(s) Oral every 12 hours  sodium chloride 0.9%. 500milliLiter(s) IV Continuous <Continuous>  cefepime  IVPB  IV Intermittent   cefepime  IVPB 2000milliGRAM(s) IV Intermittent every 8 hours    MEDICATIONS  (PRN):  HYDROmorphone PCA (1 mG/mL) Rescue Clinician Bolus 0.5milliGRAM(s) IV Push every 15 minutes PRN for Pain Scale GREATER THAN 6  naloxone Injectable 0.1milliGRAM(s) IV Push every 3 minutes PRN For ANY of the following changes in patient status:  A. RR LESS THAN 10 breaths per minute, B. Oxygen saturation LESS THAN 90%, C. Sedation score of 6  ondansetron Injectable 4milliGRAM(s) IV Push every 6 hours PRN Nausea  acetaminophen   Tablet 650milliGRAM(s) Oral every 6 hours PRN For Temp greater than 38.5 C (101.3 F)  HYDROmorphone  Injectable 1milliGRAM(s) SubCutaneous every 6 hours PRN Moderate Pain  ondansetron   Disintegrating Tablet 4milliGRAM(s) Oral every 4 hours PRN Nausea  HYDROmorphone PCA (1 mG/mL) Rescue Clinician Bolus 0.5milliGRAM(s) IV Push every 15 minutes PRN for Pain Scale GREATER THAN 6  HYDROmorphone PCA (1 mG/mL) Rescue Clinician Bolus 0.5milliGRAM(s) IV Push every 15 minutes PRN for Pain Scale GREATER THAN 6  HYDROmorphone PCA (1 mG/mL) Rescue Clinician Bolus 0.5milliGRAM(s) IV Push every 15 minutes PRN for Pain Scale GREATER THAN 6    Allergies    IV Contrast (Hives)  penicillin (Hives)    Intolerances      Social: no smoking, no alcohol, no illegal drugs; no recent travel, no exposure to TB  FAMILY HISTORY:  No pertinent family history in first degree relatives    ROS: the patient has no fever, no chills, no HA, no dizziness, no sore throat, no blurry vision, no CP, no palpitations, no SOB, no cough, no abdominal pain, no diarrhea, no N/V, no dysuria, no leg pain, no claudication, no rash, no joint aches, no rectal pain or bleeding, no night sweats  Vital Signs Last 24 Hrs  T(C): 37.1, Max: 38.7 (-14 @ 20:20)  T(F): 98.7, Max: 101.7 (-14 @ 20:20)  HR: 64 (55 - 93)  BP: 121/57 (86/50 - 121/57)  BP(mean): --  RR: 16 (16 - 16)  SpO2: 95% (95% - 100%)  Daily     Daily   Constitutional: well developed  HEENT: NC/AT, EOMI, PERRLA  Neck: supple  Respiratory: scattered coarse breath sounds  Cardiovascular: S1S2 regular, no murmurs  Abdomen: soft, not tender, not distended, positive BS  Genitourinary: deferred  Rectal: deferred  Musculoskeletal: no muscle tenderness, no joint swelling or tenderness  Neurological: AxOx3, moving all extremities, no focal deficits  Skin: no rashes                          10.5   11.0  )-----------( 248      ( 15 Mar 2017 06:51 )             31.6     15 Mar 2017 06:51    143    |  106    |  9      ----------------------------<  88     4.2     |  30     |  0.66     Ca    8.1        15 Mar 2017 06:51  Mg     1.6       14 Mar 2017 20:38         Urinalysis Basic - ( 14 Mar 2017 23:13 )    Color: Yellow / Appearance: Clear / S.015 / pH: x  Gluc: x / Ketone: Negative  / Bili: Negative / Urobili: 1 mg/dL   Blood: x / Protein: Negative mg/dL / Nitrite: Negative   Leuk Esterase: Trace / RBC: 3-5 /HPF / WBC 3-5   Sq Epi: x / Non Sq Epi: Occasional / Bacteria: Occasional          Radiology:    Advanced directive addressed: full resuscitation

## 2017-03-15 NOTE — CONSULT NOTE ADULT - ASSESSMENT
:Patient is 57 year old female with past medical history prior laminectomy now admitted on 3/13 and underwent elective L3-L4 laminectomy. Post op patient on 3/14 had fever and low blood pressure and associated cough, mild shortness of breath; no other specific complaints; was not ill prior to surgery.  1. Patient with post op fever  -possible early pneumonia versus atelectasis upon my review of chest xray which is not officially read  - follow up cultures   - will empirically continue cefepime  - tolerating antibiotics without rashes or side effects   -hold on further vancomycin at this time  -iv hydration and supportive care  will follow

## 2017-03-15 NOTE — PROGRESS NOTE ADULT - ASSESSMENT
58 yo F with PMH significant for chronic back pain now s/p laminectomy lumbar fusion of L3-L4 POD #1. Medicine consult called for eval. of post op fever and hypotension    # Back Pain S/p Laminectomy/fusion/PLIF L3- L4  - Management as per primary team  - Post Op IV Abx, pain meds, AC as per primary team.    #  Fever s/p laminectomy/Fusion/PLIF L3-L4 , POD #1   # Likely pos- op PNA  - RVP panel : negative  - Leucocytosis, fever and cough - likely PNA, will start IV Cefepime and Vanco  - ID consult  - Incentive Spirometry  - Cough meds PRN  - Tylenol PRN for pain and fever  - Monitor CBC#  Fever s/p laminectomy/Fusion/PLIF L3-L4 , POD #1   - Stat CBC/BMP/Bcx /CXR  - RVP panel : negative  - Leucocytosis, fever and cough - likely PNA, will start IV Cefepime and Vanco  - ID consult  - Incentive Spirometry  - Cough meds PRN  - Tylenol PRN for pain and fever  - Monitor CBC.     Problem/Recommendation - 3:  ·  Problem: Hypotension, unspecified hypotension type.  Recommendation: - Likely 2/2 to Dilaudid PCA  - At present BP: 110/65 mm hg, pt is receiving IVF LR  - c/w IVF and Monitor BP.     Problem/Recommendation - 4:  ·  Problem: Prophylactic measure.  Recommendation: -DVT Ppx  as per primary team. 56 yo F with PMH significant for chronic back pain now s/p laminectomy lumbar fusion of L3-L4 POD #1. Medicine consult called for eval. of post op fever and hypotension    # Back Pain S/p Laminectomy/fusion/PLIF L3- L4 ( 3/13)  - Management as per primary team  - Post Op  pain meds, AC as per primary team.    #  Post -Op Fever most likely 2ndry atelectasis   - RVP panel : negative  - CXR  - Clear  - UA - neg  - continue cefepime for possible early PNA  - ID consult appreciated   - Incentive Spirometry  - Cough meds PRN  - Tylenol PRN for pain and fever  - Monitor CBC#  Fever s/p laminectomy/Fusion/PLIF L3-L4 , POD #1   - Stat CBC/BMP/Bcx /CXR  - RVP panel : negative  - Leucocytosis, fever and cough - likely PNA, will start IV Cefepime and Vanco  - ID consult  - Incentive Spirometry  - Cough meds PRN  - Tylenol PRN for pain and fever  - Monitor CBC  #Hypotension most Likely 2/2 to Dilaudid PCA - resolving  - c/w IVF and Monitor BP.       #Prophylactic measure.    -DVT Ppx  as per primary team.    Thank you for this courtesy consult

## 2017-03-15 NOTE — PHYSICAL THERAPY INITIAL EVALUATION ADULT - DID THE PATIENT HAVE SURGERY?
Lumbar fusion of the anterior column, posterior technique  03/13/2017  laminectomy/fusion/PLIF L3/4, exploration of fusion/yes

## 2017-03-15 NOTE — PROGRESS NOTE ADULT - SUBJECTIVE AND OBJECTIVE BOX
PMD: Dr. Flori Beck  	  58 yo F with PMH significant for chronic back pain now s/p laminectomy lumbar fusion of L3-L4 POD #1. Medicine consult called for eval. of post op fever and hypotension. Pt was seen and examined at 2N, resting comfortably in bed. Pt is c/o fever and productive cough x 1 day. Pt is also c/o chest heaviness and SOB when she coughs. c/o nasal congestion. Denies palpitations. Denies abdominal pain, N/V/D/C.     Pt denies any PMH of HTN, HLD, DM or CAD. Pt is on PCA Dilaudid and SBP:  mm hg. Pt is receiving IVF  ml/hr. RN also reported temp: 101.7 F. Stat labs CBC/BMP/UA/CE/BCx , EKG and CXR ordered.      3/15:    # ROS: + back pain, fever , cough, chest pain and SOB on cough, rest of the ROS negative    Physical exam:  Vital Signs Last 24 Hrs  T(C): 37.1, Max: 38.7 (03-14 @ 20:20)  T(F): 98.7, Max: 101.7 (03-14 @ 20:20)  HR: 64 (54 - 93)  BP: 121/57 (86/50 - 121/57)  BP(mean): --  RR: 16 (16 - 16)  SpO2: 95% (95% - 100%)    General: NAD  Lungs: CTA b/l  CVS: S1 S2 +  Abdomen: soft, NT, ND, BS +  Extremities No edema  Musculoskeletal: LE and UE ROM intact, Could not examined back/spine as pt is in pain s/p lumbar fusion and refused to move.  Skin: no rash  Neuro: AAO x 3, gross motor and sensational intact  Psychiatry: normal mood      Lab Results:  CBC  CBC Full  -  ( 15 Mar 2017 06:51 )  WBC Count : 11.0 K/uL  Hemoglobin : 10.5 g/dL  Hematocrit : 31.6 %  Platelet Count - Automated : 248 K/uL  Mean Cell Volume : 94.8 fl  Mean Cell Hemoglobin : 31.6 pg  Mean Cell Hemoglobin Concentration : 33.3 gm/dL  Auto Neutrophil # : 7.2 K/uL  Auto Lymphocyte # : 2.5 K/uL  Auto Monocyte # : 1.0 K/uL  Auto Eosinophil # : 0.2 K/uL  Auto Basophil # : 0.1 K/uL  Auto Neutrophil % : 65.6 %  Auto Lymphocyte % : 22.6 %  Auto Monocyte % : 9.4 %  Auto Eosinophil % : 1.7 %  Auto Basophil % : 0.8 %    .		Differential:	[] Automated		[] Manual  Chemistry                        10.5   11.0  )-----------( 248      ( 15 Mar 2017 06:51 )             31.6     15 Mar 2017 06:51    143    |  106    |  9      ----------------------------<  88     4.2     |  30     |  0.66     Ca    8.1        15 Mar 2017 06:51  Mg     1.6       14 Mar 2017 20:38        PT/INR - ( 14 Mar 2017 20:38 )   PT: 11.9 sec;   INR: 1.08 ratio         PTT - ( 14 Mar 2017 20:38 )  PTT:29.5 sec  Urinalysis Basic - ( 14 Mar 2017 23:13 )    Color: Yellow / Appearance: Clear / S.015 / pH: x  Gluc: x / Ketone: Negative  / Bili: Negative / Urobili: 1 mg/dL   Blood: x / Protein: Negative mg/dL / Nitrite: Negative   Leuk Esterase: Trace / RBC: 3-5 /HPF / WBC 3-5   Sq Epi: x / Non Sq Epi: Occasional / Bacteria: Occasional      RADIOLOGY RESULTS: PMD: Dr. Flori Beck  	  56 yo F with PMH significant for chronic back pain now s/p laminectomy lumbar fusion of L3-L4 POD #1. Medicine consult called for eval. of post op fever and hypotension. Pt was seen and examined at 2N, resting comfortably in bed. Pt is c/o fever and productive cough x 1 day. Pt is also c/o chest heaviness and SOB when she coughs. c/o nasal congestion. Denies palpitations. Denies abdominal pain, N/V/D/C.     Pt denies any PMH of HTN, HLD, DM or CAD. Pt is on PCA Dilaudid and SBP:  mm hg. Pt is receiving IVF  ml/hr. RN also reported temp: 101.7 F. Stat labs CBC/BMP/UA/CE/BCx , EKG and CXR ordered.      3/15:  Positive cough.  patient in the past had fever and Cough post operatively and thinks its because of atelectasis.  She spiked a fever of 101 this morning.         # ROS: + back pain, fever , cough, rest of the ROS negative    Physical exam:  Vital Signs Last 24 Hrs  T(C): 37.1, Max: 38.7 (03-14 @ 20:20)  T(F): 98.7, Max: 101.7 (03-14 @ 20:20)  HR: 64 (54 - 93)  BP: 121/57 (86/50 - 121/57)  BP(mean): --  RR: 16 (16 - 16)  SpO2: 95% (95% - 100%)    General: NAD  Lungs: CTA b/l  CVS: S1 S2 +  Abdomen: soft, NT, ND, BS +  Extremities No edema  Musculoskeletal: LE and UE ROM intact, Could not examined back/spine as pt is in pain s/p lumbar fusion and refused to move.  Skin: no rash  Neuro: AAO x 3, gross motor and sensational intact  Psychiatry: normal mood      Lab Results:  CBC  CBC Full  -  ( 15 Mar 2017 06:51 )  WBC Count : 11.0 K/uL  Hemoglobin : 10.5 g/dL  Hematocrit : 31.6 %  Platelet Count - Automated : 248 K/uL  Mean Cell Volume : 94.8 fl  Mean Cell Hemoglobin : 31.6 pg  Mean Cell Hemoglobin Concentration : 33.3 gm/dL  Auto Neutrophil # : 7.2 K/uL  Auto Lymphocyte # : 2.5 K/uL  Auto Monocyte # : 1.0 K/uL  Auto Eosinophil # : 0.2 K/uL  Auto Basophil # : 0.1 K/uL  Auto Neutrophil % : 65.6 %  Auto Lymphocyte % : 22.6 %  Auto Monocyte % : 9.4 %  Auto Eosinophil % : 1.7 %  Auto Basophil % : 0.8 %    .		Differential:	[] Automated		[] Manual  Chemistry                        10.5   11.0  )-----------( 248      ( 15 Mar 2017 06:51 )             31.6     15 Mar 2017 06:51    143    |  106    |  9      ----------------------------<  88     4.2     |  30     |  0.66     Ca    8.1        15 Mar 2017 06:51  Mg     1.6       14 Mar 2017 20:38        PT/INR - ( 14 Mar 2017 20:38 )   PT: 11.9 sec;   INR: 1.08 ratio         PTT - ( 14 Mar 2017 20:38 )  PTT:29.5 sec  Urinalysis Basic - ( 14 Mar 2017 23:13 )    Color: Yellow / Appearance: Clear / S.015 / pH: x  Gluc: x / Ketone: Negative  / Bili: Negative / Urobili: 1 mg/dL   Blood: x / Protein: Negative mg/dL / Nitrite: Negative   Leuk Esterase: Trace / RBC: 3-5 /HPF / WBC 3-5   Sq Epi: x / Non Sq Epi: Occasional / Bacteria: Occasional      RADIOLOGY RESULTS:    EXAM:  CHEST SINGLE VIEW FRONTAL      2017     IMPRESSION:    Clear lungs

## 2017-03-15 NOTE — PROGRESS NOTE ADULT - SUBJECTIVE AND OBJECTIVE BOX
[facility]                                                           Orthopedic Spine Progress Note      POST OPERATIVE DAY #: 3  STATUS POST:             L4-5 TLIF    Pre-Op Dx: Lumbar stenosis with neurogenic claudication    Post-Op Dx:  Lumbar stenosis with neurogenic claudication      SUBJECTIVE: Patient seen and examined.   Moderate pain on PCA  No LE issues  Has 2 drain ins place  Not ambulating yet      Current Pain Management:  [ X] PCA   [ ] Po Analgesics [ ] IM /IV Anagesics     Vital Signs Last 24 Hrs  T(C): 37.1, Max: 38.7 ( @ 20:20)  T(F): 98.7, Max: 101.7 (-14 @ 20:20)  HR: 64 (55 - 93)  BP: 121/57 (86/50 - 121/57)  BP(mean): --  RR: 16 (16 - 16)  SpO2: 95% (95% - 100%)  I&O's Detail  I & Os for 24h ending 15 Mar 2017 07:00  =============================================  IN:    lactated ringers.: 1900 ml    Oral Fluid: 500 ml    Sodium Chloride 0.9% IV Bolus: 500 ml    sodium chloride 0.9%.: 500 ml    Solution: 300 ml    lactated ringers.: 225 ml    Total IN: 3925 ml  ---------------------------------------------  OUT:    Indwelling Catheter - Urethral: 4550 ml    Bulb: 85 ml    Accordian: 15 ml    Total OUT: 4650 ml  ---------------------------------------------  Total NET: -725 ml    I & Os for current day (as of 15 Mar 2017 10:42)  =============================================  IN:    Total IN: 0 ml  ---------------------------------------------  OUT:    Voided: 900 ml    Total OUT: 900 ml  ---------------------------------------------  Total NET: -900 ml      OBJECTIVE:         Wound /Dressing:   Cervical ROM:  Lumbar: ROM :  Neurological: A/O x               Sensation: [ X] intact to light touch  [ ] decreased:          Motor exam: [  ]                [X ] Lower ext.     Hip Flx  Hip Ext   Hip Abd  Hip Add Quad   Hamstrg   TA       EHL      GS                              R        5/5        5/5        5/5             5/5        5/5        5/5        5/5     5/5      5/5                              L         5/5        5/5        5/5             5/5        5/5        5/5        5/5     5/5      5/5                                                       LABS:                        10.5   11.0  )-----------( 248      ( 15 Mar 2017 06:51 )             31.6     15 Mar 2017 06:51    143    |  106    |  9      ----------------------------<  88     4.2     |  30     |  0.66     Ca    8.1        15 Mar 2017 06:51  Mg     1.6       14 Mar 2017 20:38      PT/INR - ( 14 Mar 2017 20:38 )   PT: 11.9 sec;   INR: 1.08 ratio         PTT - ( 14 Mar 2017 20:38 )  PTT:29.5 sec  Urinalysis Basic - ( 14 Mar 2017 23:13 )    Color: Yellow / Appearance: Clear / S.015 / pH: x  Gluc: x / Ketone: Negative  / Bili: Negative / Urobili: 1 mg/dL   Blood: x / Protein: Negative mg/dL / Nitrite: Negative   Leuk Esterase: Trace / RBC: 3-5 /HPF / WBC 3-5   Sq Epi: x / Non Sq Epi: Occasional / Bacteria: Occasional        A/P :  57y Female   s/p:  L4-5 TLIF  -    Pain control DC PCA start orals  -    DVT ppx: SCDs    -    Abx:  on Cefipine per ID , had post op fever and cough  -    Review labs as indicated     -    Physical Therapy  -    Weight bearing status: As tolerated  -Cont drains as she has not ambulated

## 2017-03-15 NOTE — PHYSICAL THERAPY INITIAL EVALUATION ADULT - CRITERIA FOR SKILLED THERAPEUTIC INTERVENTIONS
predicted duration of therapy intervention/risk reduction/prevention/anticipated discharge recommendation/rehab potential/impairments found/functional limitations in following categories/therapy frequency/anticipated equipment needs at discharge

## 2017-03-16 LAB
ANION GAP SERPL CALC-SCNC: 6 MMOL/L — SIGNIFICANT CHANGE UP (ref 5–17)
BASOPHILS # BLD AUTO: 0.1 K/UL — SIGNIFICANT CHANGE UP (ref 0–0.2)
BASOPHILS NFR BLD AUTO: 0.8 % — SIGNIFICANT CHANGE UP (ref 0–2)
BUN SERPL-MCNC: 7 MG/DL — SIGNIFICANT CHANGE UP (ref 7–23)
CALCIUM SERPL-MCNC: 8.3 MG/DL — LOW (ref 8.5–10.1)
CHLORIDE SERPL-SCNC: 104 MMOL/L — SIGNIFICANT CHANGE UP (ref 96–108)
CO2 SERPL-SCNC: 32 MMOL/L — HIGH (ref 22–31)
CREAT SERPL-MCNC: 0.52 MG/DL — SIGNIFICANT CHANGE UP (ref 0.5–1.3)
EOSINOPHIL # BLD AUTO: 0.2 K/UL — SIGNIFICANT CHANGE UP (ref 0–0.5)
EOSINOPHIL NFR BLD AUTO: 1.5 % — SIGNIFICANT CHANGE UP (ref 0–6)
GLUCOSE SERPL-MCNC: 108 MG/DL — HIGH (ref 70–99)
HCT VFR BLD CALC: 32.7 % — LOW (ref 34.5–45)
HGB BLD-MCNC: 11.3 G/DL — LOW (ref 11.5–15.5)
LYMPHOCYTES # BLD AUTO: 1.8 K/UL — SIGNIFICANT CHANGE UP (ref 1–3.3)
LYMPHOCYTES # BLD AUTO: 16.8 % — SIGNIFICANT CHANGE UP (ref 13–44)
MCHC RBC-ENTMCNC: 32.6 PG — SIGNIFICANT CHANGE UP (ref 27–34)
MCHC RBC-ENTMCNC: 34.7 GM/DL — SIGNIFICANT CHANGE UP (ref 32–36)
MCV RBC AUTO: 94 FL — SIGNIFICANT CHANGE UP (ref 80–100)
MONOCYTES # BLD AUTO: 1 K/UL — HIGH (ref 0–0.9)
MONOCYTES NFR BLD AUTO: 9 % — SIGNIFICANT CHANGE UP (ref 2–14)
NEUTROPHILS # BLD AUTO: 7.9 K/UL — HIGH (ref 1.8–7.4)
NEUTROPHILS NFR BLD AUTO: 72 % — SIGNIFICANT CHANGE UP (ref 43–77)
PLATELET # BLD AUTO: 244 K/UL — SIGNIFICANT CHANGE UP (ref 150–400)
POTASSIUM SERPL-MCNC: 4.3 MMOL/L — SIGNIFICANT CHANGE UP (ref 3.5–5.3)
POTASSIUM SERPL-SCNC: 4.3 MMOL/L — SIGNIFICANT CHANGE UP (ref 3.5–5.3)
RBC # BLD: 3.47 M/UL — LOW (ref 3.8–5.2)
RBC # FLD: 11.6 % — SIGNIFICANT CHANGE UP (ref 10.3–14.5)
SODIUM SERPL-SCNC: 142 MMOL/L — SIGNIFICANT CHANGE UP (ref 135–145)
WBC # BLD: 11 K/UL — HIGH (ref 3.8–10.5)
WBC # FLD AUTO: 11 K/UL — HIGH (ref 3.8–10.5)

## 2017-03-16 RX ADMIN — CEFEPIME 100 MILLIGRAM(S): 1 INJECTION, POWDER, FOR SOLUTION INTRAMUSCULAR; INTRAVENOUS at 06:18

## 2017-03-16 RX ADMIN — CYCLOBENZAPRINE HYDROCHLORIDE 10 MILLIGRAM(S): 10 TABLET, FILM COATED ORAL at 13:51

## 2017-03-16 RX ADMIN — MAGNESIUM OXIDE 400 MG ORAL TABLET 400 MILLIGRAM(S): 241.3 TABLET ORAL at 06:18

## 2017-03-16 RX ADMIN — Medication 100 MILLIGRAM(S): at 06:18

## 2017-03-16 RX ADMIN — MUPIROCIN 1 APPLICATION(S): 20 OINTMENT TOPICAL at 17:36

## 2017-03-16 RX ADMIN — HYDROMORPHONE HYDROCHLORIDE 1 MILLIGRAM(S): 2 INJECTION INTRAMUSCULAR; INTRAVENOUS; SUBCUTANEOUS at 01:50

## 2017-03-16 RX ADMIN — CYCLOBENZAPRINE HYDROCHLORIDE 10 MILLIGRAM(S): 10 TABLET, FILM COATED ORAL at 06:18

## 2017-03-16 RX ADMIN — HYDROMORPHONE HYDROCHLORIDE 1 MILLIGRAM(S): 2 INJECTION INTRAMUSCULAR; INTRAVENOUS; SUBCUTANEOUS at 01:20

## 2017-03-16 RX ADMIN — Medication 100 MILLIGRAM(S): at 21:09

## 2017-03-16 RX ADMIN — Medication 650 MILLIGRAM(S): at 21:09

## 2017-03-16 RX ADMIN — HYDROMORPHONE HYDROCHLORIDE 1 MILLIGRAM(S): 2 INJECTION INTRAMUSCULAR; INTRAVENOUS; SUBCUTANEOUS at 21:07

## 2017-03-16 RX ADMIN — CEFEPIME 100 MILLIGRAM(S): 1 INJECTION, POWDER, FOR SOLUTION INTRAMUSCULAR; INTRAVENOUS at 13:51

## 2017-03-16 RX ADMIN — CEFEPIME 100 MILLIGRAM(S): 1 INJECTION, POWDER, FOR SOLUTION INTRAMUSCULAR; INTRAVENOUS at 21:10

## 2017-03-16 RX ADMIN — MUPIROCIN 1 APPLICATION(S): 20 OINTMENT TOPICAL at 06:19

## 2017-03-16 RX ADMIN — Medication 100 MILLIGRAM(S): at 13:52

## 2017-03-16 RX ADMIN — CYCLOBENZAPRINE HYDROCHLORIDE 10 MILLIGRAM(S): 10 TABLET, FILM COATED ORAL at 21:09

## 2017-03-16 NOTE — PROGRESS NOTE ADULT - ASSESSMENT
:Patient is 57 year old female with past medical history prior laminectomy now admitted on 3/13 and underwent elective L3-L4 laminectomy. Post op patient on 3/14 had fever and low blood pressure and associated cough, mild shortness of breath; no other specific complaints; was not ill prior to surgery.  1. Patient with post op fever  -possible early pneumonia versus atelectasis upon my review of chest xray which is not officially read  - follow up cultures   - day #2 continue cefepime  - tolerating antibiotics without rashes or side effects   -hold on further vancomycin at this time  -iv hydration and supportive care  will follow

## 2017-03-16 NOTE — PROGRESS NOTE ADULT - SUBJECTIVE AND OBJECTIVE BOX
[facility]                                                           Orthopedic Spine Progress Note      POST OPERATIVE DAY #: 3  STATUS POST:    left sided TLIF L4/5          Pre-Op Dx: Lumbar stenosis with neurogenic claudication    Post-Op Dx:  Lumbar stenosis with neurogenic claudication  Prin. Procedure:  left sided TLIF L4/5    SUBJECTIVE: Patient seen and examined.  Reports improvement in groin pain and pain control.         Vital Signs Last 24 Hrs  T(C): 37.2, Max: 38.3 (03-15 @ 12:17)  T(F): 98.9, Max: 101 (03-15 @ 12:17)  HR: 86 (86 - 96)  BP: 108/64 (100/52 - 135/82)  BP(mean): --  RR: 18 (18 - 18)  SpO2: 96% (94% - 100%)  I&O's Detail    I & Os for current day (as of 16 Mar 2017 08:55)  =============================================  IN:    Oral Fluid: 240 ml    Total IN: 240 ml  ---------------------------------------------  OUT:    Voided: 1250 ml    Bulb: 55 ml    Accordian: 35 ml    Total OUT: 1340 ml  ---------------------------------------------  Total NET: -1100 ml      OBJECTIVE:         Wound /Dressing:   Dressing changed,   Drains x 2 removed.     Neurological: A/O x               Sensation: [ ] intact to light touch  [ ] decreased:          Motor exam: [  ]          [ ] Upper extremity    Delt      Bicp       Tri      Wrist ext  Wrst Flex       Digit Ext Digit Flex                                     R         5/5        5/5        5/5       5/5            5/5            5/5       5/5          5/5                                     L          5/5        5/5        5/5       5/5            5/5            5/5       5/5          5/5         [ ] Lower ext.     Hip Flx  Hip Ext   Hip Abd  Hip Add Quad   Hamstrg   TA       EHL      GS                              R        /5        5/5        5/5             5/5        5/5        5/5        5/5     5/5      5/5                              L         5/5        5/5        5/5             5/5        5/5        5/5        5/5     5/5      5/5                                                        RADIOLOGY & ADDITIONAL STUDIES:                                               LABS:                        11.3   11.0  )-----------( 244      ( 16 Mar 2017 05:46 )             32.7     16 Mar 2017 05:46    142    |  104    |  7      ----------------------------<  108    4.3     |  32     |  0.52     Ca    8.3        16 Mar 2017 05:46  Mg     1.6       14 Mar 2017 20:38      PT/INR - ( 14 Mar 2017 20:38 )   PT: 11.9 sec;   INR: 1.08 ratio         PTT - ( 14 Mar 2017 20:38 )  PTT:29.5 sec  Urinalysis Basic - ( 14 Mar 2017 23:13 )    Color: Yellow / Appearance: Clear / S.015 / pH: x  Gluc: x / Ketone: Negative  / Bili: Negative / Urobili: 1 mg/dL   Blood: x / Protein: Negative mg/dL / Nitrite: Negative   Leuk Esterase: Trace / RBC: 3-5 /HPF / WBC 3-5   Sq Epi: x / Non Sq Epi: Occasional / Bacteria: Occasional      Blood Culture:   Wound Culture:         -  POD # 3 S/ P TLIF Left L4-5  - Patient seen and examined,  up ambulating with a walker, notes improvement in groin pain which was severe at pre-op.   - Afebrile,   - Dressing changed, drains x 2 removed,  10 / 25 cc - wound looks good.   - Neuro remains intact,    - Pain tolerable with  po analgesics  - labs reviewed  wbc 11 .  - Continue to encourage ambulation with walker / physical therapy.,   - Overall she is doing well, and continues to progress in post op course, anticipate d/c in am.

## 2017-03-16 NOTE — PROGRESS NOTE ADULT - SUBJECTIVE AND OBJECTIVE BOX
PMD: Dr. Flori Beck  	  56 yo F with PMH significant for chronic back pain now s/p laminectomy lumbar fusion of L3-L4 POD #1. Medicine consult called for eval. of post op fever and hypotension. Pt was seen and examined at 2N, resting comfortably in bed. Pt is c/o fever and productive cough x 1 day. Pt is also c/o chest heaviness and SOB when she coughs. c/o nasal congestion. Denies palpitations. Denies abdominal pain, N/V/D/C.     Pt denies any PMH of HTN, HLD, DM or CAD. Pt is on PCA Dilaudid and SBP:  mm hg. Pt is receiving IVF  ml/hr. RN also reported temp: 101.7 F. Stat labs CBC/BMP/UA/CE/BCx , EKG and CXR ordered.      3/15:  Positive cough.  patient in the past had fever and Cough post operatively and thinks its because of atelectasis.  She spiked a fever of 101 this morning.     3/16: No fever overnight continues to have cough      # ROS: + back pain, cough, rest of the ROS negative    Physical exam:  Vital Signs Last 24 Hrs  T(C): 37.2, Max: 38.1 (-15 @ 14:37)  T(F): 98.9, Max: 100.5 (03-15 @ 14:37)  HR: 86 (86 - 96)  BP: 108/64 (100/52 - 121/75)  BP(mean): --  RR: 18 (18 - 18)  SpO2: 96% (96% - 100%)    General: NAD  Lungs: CTA b/l  CVS: S1 S2 +  Abdomen: soft, NT, ND, BS +  Extremities No edema  Musculoskeletal: LE and UE ROM intact, Could not examined back/spine as pt is in pain s/p lumbar fusion and refused to move.  Skin: no rash  Neuro: AAO x 3, gross motor and sensational intact  Psychiatry: normal mood    Lab Results:  CBC  CBC Full  -  ( 16 Mar 2017 05:46 )  WBC Count : 11.0 K/uL  Hemoglobin : 11.3 g/dL  Hematocrit : 32.7 %  Platelet Count - Automated : 244 K/uL  Mean Cell Volume : 94.0 fl  Mean Cell Hemoglobin : 32.6 pg  Mean Cell Hemoglobin Concentration : 34.7 gm/dL  Auto Neutrophil # : 7.9 K/uL  Auto Lymphocyte # : 1.8 K/uL  Auto Monocyte # : 1.0 K/uL  Auto Eosinophil # : 0.2 K/uL  Auto Basophil # : 0.1 K/uL  Auto Neutrophil % : 72.0 %  Auto Lymphocyte % : 16.8 %  Auto Monocyte % : 9.0 %  Auto Eosinophil % : 1.5 %  Auto Basophil % : 0.8 %    .		Differential:	[] Automated		[] Manual  Chemistry                        11.3   11.0  )-----------( 244      ( 16 Mar 2017 05:46 )             32.7     16 Mar 2017 05:46    142    |  104    |  7      ----------------------------<  108    4.3     |  32     |  0.52     Ca    8.3        16 Mar 2017 05:46  Mg     1.6       14 Mar 2017 20:38        PT/INR - ( 14 Mar 2017 20:38 )   PT: 11.9 sec;   INR: 1.08 ratio         PTT - ( 14 Mar 2017 20:38 )  PTT:29.5 sec  Urinalysis Basic - ( 14 Mar 2017 23:13 )    Color: Yellow / Appearance: Clear / S.015 / pH: x  Gluc: x / Ketone: Negative  / Bili: Negative / Urobili: 1 mg/dL   Blood: x / Protein: Negative mg/dL / Nitrite: Negative   Leuk Esterase: Trace / RBC: 3-5 /HPF / WBC 3-5   Sq Epi: x / Non Sq Epi: Occasional / Bacteria: Occasional    MICROBIOLOGY/CULTURES:  Culture Results:   No growth to date. ( @ 20:38)      RADIOLOGY RESULTS:    EXAM:  CHEST SINGLE VIEW FRONTAL      2017     IMPRESSION:    Clear lungs

## 2017-03-16 NOTE — PROGRESS NOTE ADULT - ASSESSMENT
56 yo F with PMH significant for chronic back pain now s/p laminectomy lumbar fusion of L3-L4 POD #1. Medicine consult called for eval. of post op fever and hypotension    # Back Pain S/p Laminectomy/fusion/PLIF L3- L4 ( 3/13)  - Management as per primary team  - Post Op  pain meds, AC as per primary team.    #  Post -Op Fever most likely 2ndry atelectasis   - RVP panel : negative  - CXR  - Clear  - UA - neg  - continue cefepime for possible early PNA  - ID consult appreciated   - Incentive Spirometry  - Cough meds PRN  - Tylenol PRN for pain and fever  - Monitor CBC#  Fever s/p laminectomy/Fusion/PLIF L3-L4 , POD #1   - Stat CBC/BMP/Bcx /CXR  - RVP panel : negative  - Leucocytosis, fever and cough - likely PNA, will start IV Cefepime and Vanco  - ID consult  - Incentive Spirometry  - Cough meds PRN  - Tylenol PRN for pain and fever  - Monitor CBC  #Hypotension most Likely 2/2 to Dilaudid PCA - resolving  - c/w IVF and Monitor BP.       #Prophylactic measure.    -DVT Ppx  as per primary team.    Thank you for this courtesy consult

## 2017-03-16 NOTE — PROGRESS NOTE ADULT - SUBJECTIVE AND OBJECTIVE BOX
HPI:Patient is 57 year old female with past medical history prior laminectomy now admitted on 3/13 and underwent elective L3-L4 laminectomy. Post op patient on 3/14 had fever and low blood pressure and associated cough, mild shortness of breath; no other specific complaints; was not ill prior to surgery.  Today 3/16 patient comfortable, less cough; had low grade fever late yesterday afternoon, none since    MEDICATIONS  (STANDING):  lactated ringers. 1000milliLiter(s) IV Continuous <Continuous>  influenza   Vaccine 0.5milliLiter(s) IntraMuscular once  lactated ringers. 1000milliLiter(s) IV Continuous <Continuous>  cyclobenzaprine 10milliGRAM(s) Oral every 8 hours  docusate sodium 100milliGRAM(s) Oral three times a day  mupirocin 2% Ointment 1Application(s) Topical two times a day  sodium chloride 0.9%. 500milliLiter(s) IV Continuous <Continuous>  cefepime  IVPB  IV Intermittent   cefepime  IVPB 2000milliGRAM(s) IV Intermittent every 8 hours    MEDICATIONS  (PRN):  HYDROmorphone PCA (1 mG/mL) Rescue Clinician Bolus 0.5milliGRAM(s) IV Push every 15 minutes PRN for Pain Scale GREATER THAN 6  naloxone Injectable 0.1milliGRAM(s) IV Push every 3 minutes PRN For ANY of the following changes in patient status:  A. RR LESS THAN 10 breaths per minute, B. Oxygen saturation LESS THAN 90%, C. Sedation score of 6  ondansetron Injectable 4milliGRAM(s) IV Push every 6 hours PRN Nausea  acetaminophen   Tablet 650milliGRAM(s) Oral every 6 hours PRN For Temp greater than 38.5 C (101.3 F)  HYDROmorphone  Injectable 1milliGRAM(s) SubCutaneous every 6 hours PRN Moderate Pain  ondansetron   Disintegrating Tablet 4milliGRAM(s) Oral every 4 hours PRN Nausea  HYDROmorphone PCA (1 mG/mL) Rescue Clinician Bolus 0.5milliGRAM(s) IV Push every 15 minutes PRN for Pain Scale GREATER THAN 6  oxyCODONE  5 mG/acetaminophen 325 mG 1Tablet(s) Oral every 4 hours PRN Mild Pain (1 - 3)  oxyCODONE  5 mG/acetaminophen 325 mG 2Tablet(s) Oral every 4 hours PRN Moderate Pain (4 - 6)      Vital Signs Last 24 Hrs  T(C): 37.2, Max: 37.8 (-15 @ 15:25)  T(F): 98.9, Max: 100 (03-15 @ 15:25)  HR: 86 (86 - 96)  BP: 108/64 (100/52 - 121/75)  BP(mean): --  RR: 18 (18 - 18)  SpO2: 96% (96% - 100%)    Physical Exam:            Constitutional: well developed  HEENT: NC/AT, EOMI, PERRLA  Neck: supple  Respiratory: scattered coarse breath sounds  Cardiovascular: S1S2 regular, no murmurs  Abdomen: soft, not tender, not distended, positive BS  Genitourinary: deferred  Rectal: deferred  Musculoskeletal: no muscle tenderness, no joint swelling or tenderness  Neurological: AxOx3, moving all extremities, no focal deficits  Skin: no rashes                          10.5   11.0  )-----------( 248      ( 15 Mar 2017 06:51 )             31.6     15 Mar 2017 06:51    143    |  106    |  9      ----------------------------<  88     4.2     |  30     |  0.66     Ca    8.1        15 Mar 2017 06:51  Mg     1.6       14 Mar 2017 20:38         Urinalysis Basic - ( 14 Mar 2017 23:13 )    Color: Yellow / Appearance: Clear / S.015 / pH: x  Gluc: x / Ketone: Negative  / Bili: Negative / Urobili: 1 mg/dL   Blood: x / Protein: Negative mg/dL / Nitrite: Negative   Leuk Esterase: Trace / RBC: 3-5 /HPF / WBC 3-5   Sq Epi: x / Non Sq Epi: Occasional / Bacteria: Occasional          Radiology:    Advanced directive addressed: full resuscitation

## 2017-03-17 ENCOUNTER — TRANSCRIPTION ENCOUNTER (OUTPATIENT)
Age: 57
End: 2017-03-17

## 2017-03-17 VITALS
RESPIRATION RATE: 18 BRPM | HEART RATE: 75 BPM | DIASTOLIC BLOOD PRESSURE: 72 MMHG | SYSTOLIC BLOOD PRESSURE: 120 MMHG | OXYGEN SATURATION: 98 % | TEMPERATURE: 98 F

## 2017-03-17 RX ORDER — MUPIROCIN 20 MG/G
1 OINTMENT TOPICAL
Qty: 0 | Refills: 0 | DISCHARGE
Start: 2017-03-17

## 2017-03-17 RX ORDER — CEFOXITIN 1 G/1
1 INJECTION, POWDER, FOR SOLUTION INTRAVENOUS
Qty: 10 | Refills: 0
Start: 2017-03-17 | End: 2017-03-22

## 2017-03-17 RX ORDER — CEFOXITIN 1 G/1
1 INJECTION, POWDER, FOR SOLUTION INTRAVENOUS
Qty: 10 | Refills: 0 | OUTPATIENT
Start: 2017-03-17 | End: 2017-03-22

## 2017-03-17 RX ORDER — TRAMADOL HYDROCHLORIDE 50 MG/1
2 TABLET ORAL
Qty: 0 | Refills: 0 | COMMUNITY

## 2017-03-17 RX ORDER — OXYCODONE HYDROCHLORIDE 5 MG/1
1 TABLET ORAL
Qty: 0 | Refills: 0 | COMMUNITY

## 2017-03-17 RX ORDER — CYCLOBENZAPRINE HYDROCHLORIDE 10 MG/1
1 TABLET, FILM COATED ORAL
Qty: 0 | Refills: 0 | DISCHARGE
Start: 2017-03-17

## 2017-03-17 RX ORDER — DOCUSATE SODIUM 100 MG
1 CAPSULE ORAL
Qty: 0 | Refills: 0 | DISCHARGE
Start: 2017-03-17

## 2017-03-17 RX ADMIN — Medication 100 MILLIGRAM(S): at 06:10

## 2017-03-17 RX ADMIN — HYDROMORPHONE HYDROCHLORIDE 1 MILLIGRAM(S): 2 INJECTION INTRAMUSCULAR; INTRAVENOUS; SUBCUTANEOUS at 02:30

## 2017-03-17 RX ADMIN — CEFEPIME 100 MILLIGRAM(S): 1 INJECTION, POWDER, FOR SOLUTION INTRAMUSCULAR; INTRAVENOUS at 06:10

## 2017-03-17 RX ADMIN — MUPIROCIN 1 APPLICATION(S): 20 OINTMENT TOPICAL at 06:12

## 2017-03-17 RX ADMIN — CYCLOBENZAPRINE HYDROCHLORIDE 10 MILLIGRAM(S): 10 TABLET, FILM COATED ORAL at 06:10

## 2017-03-17 NOTE — PROGRESS NOTE ADULT - SUBJECTIVE AND OBJECTIVE BOX
PMD: Dr. Flori Beck  	  56 yo F with PMH significant for chronic back pain now s/p laminectomy lumbar fusion of L3-L4 POD #1. Medicine consult called for eval. of post op fever and hypotension. Pt was seen and examined at 2N, resting comfortably in bed. Pt is c/o fever and productive cough x 1 day. Pt is also c/o chest heaviness and SOB when she coughs. c/o nasal congestion. Denies palpitations. Denies abdominal pain, N/V/D/C.     Pt denies any PMH of HTN, HLD, DM or CAD. Pt is on PCA Dilaudid and SBP:  mm hg. Pt is receiving IVF  ml/hr. RN also reported temp: 101.7 F. Stat labs CBC/BMP/UA/CE/BCx , EKG and CXR ordered.      3/15:  Positive cough.  patient in the past had fever and Cough post operatively and thinks its because of atelectasis.  She spiked a fever of 101 this morning.     3/16: No fever overnight continues to have cough  3/17:  No fever overnight. continues to have productive cough. No CP or dyspnea. Patient is ready for discharge today. Advised to FU with her PCP for resolution of her cough.       # ROS: + back pain, cough, rest of the ROS negative    Physical exam:  Vital Signs Last 24 Hrs  T(C): 36.8, Max: 37.5 (03-16 @ 21:00)  T(F): 98.3, Max: 99.5 (03-16 @ 21:00)  HR: 75 (72 - 76)  BP: 120/72 (100/65 - 120/72)  BP(mean): --  RR: 18 (16 - 18)  SpO2: 98% (98% - 100%)    General: NAD  Lungs: CTA b/l  CVS: S1 S2 +  Abdomen: soft, NT, ND, BS +  Extremities No edema  Musculoskeletal: LE and UE ROM intact, Could not examined back/spine as pt is in pain s/p lumbar fusion and refused to move.  Skin: no rash  Neuro: AAO x 3, gross motor and sensational intact  Psychiatry: normal mood    Lab Results:  CBC  CBC Full  -  ( 16 Mar 2017 05:46 )  WBC Count : 11.0 K/uL  Hemoglobin : 11.3 g/dL  Hematocrit : 32.7 %  Platelet Count - Automated : 244 K/uL  Mean Cell Volume : 94.0 fl  Mean Cell Hemoglobin : 32.6 pg  Mean Cell Hemoglobin Concentration : 34.7 gm/dL  Auto Neutrophil # : 7.9 K/uL  Auto Lymphocyte # : 1.8 K/uL  Auto Monocyte # : 1.0 K/uL  Auto Eosinophil # : 0.2 K/uL  Auto Basophil # : 0.1 K/uL  Auto Neutrophil % : 72.0 %  Auto Lymphocyte % : 16.8 %  Auto Monocyte % : 9.0 %  Auto Eosinophil % : 1.5 %  Auto Basophil % : 0.8 %    .		Differential:	[] Automated		[] Manual  Chemistry                        11.3   11.0  )-----------( 244      ( 16 Mar 2017 05:46 )             32.7     16 Mar 2017 05:46    142    |  104    |  7      ----------------------------<  108    4.3     |  32     |  0.52     Ca    8.3        16 Mar 2017 05:46      MICROBIOLOGY/CULTURES:  Culture Results:   No growth to date. ( @ 20:38)      RADIOLOGY RESULTS:    Color: Yellow / Appearance: Clear / S.015 / pH: x  Gluc: x / Ketone: Negative  / Bili: Negative / Urobili: 1 mg/dL   Blood: x / Protein: Negative mg/dL / Nitrite: Negative   Leuk Esterase: Trace / RBC: 3-5 /HPF / WBC 3-5   Sq Epi: x / Non Sq Epi: Occasional / Bacteria: Occasional    MICROBIOLOGY/CULTURES:  Culture Results:   No growth to date. ( @ 20:38)      RADIOLOGY RESULTS:    EXAM:  CHEST SINGLE VIEW FRONTAL      2017     IMPRESSION:    Clear lungs

## 2017-03-17 NOTE — DISCHARGE NOTE ADULT - FINDINGS/TREATMENT
Continue PO Analgesics  Ceftin 500mg PO Q12H for 5 days Continue PO Analgesics  Ceftin 500mg PO Q12H for 5 days.

## 2017-03-17 NOTE — DISCHARGE NOTE ADULT - HOSPITAL COURSE
3/13/2017: s/p left sided TLIF L4/5  3/14/2017: POD#1 pain controlled with PCA, drains in place, PT consulted. Had an episode of hypotension, was ordered for bolus of LR, with hospitalist consulted.  3/15/2017: POD#2 Mild pain, No LE issues, Drains in place left in place, Not Ambulating yet  ID saw and started empirical cefepine for fever cough and evaluation of pneumonia PCA removed  3/16/2017: POD#3  POD # 3 S/ P TLIF Left L4-5  Patient seen and examined, up ambulating with a walker, notes improvement in groin pain which was severe at pre-op.  Afebrile, Dressing changed, drains x 2 removed, 10 / 25 cc - wound looks good. Neuro remains intact,  Pain tolerable with po analgesics. Labs reviewed wbc 11. Continue to encourage ambulation with walker / physical therapy. Overall she is doing well, and continues to progress in post op course,    3/17/2017: POD#4. Pt seen resting at bedside. She has left groin pain and incisional site pain. Pt states left groin pain improved since surgery. She denies weakness and numbness of b/l lower extremities. Voids on own without complications. Motor and neuro intact. Incisional site with dry drainage on dressing, no active drainage. Dressing changed. Tolerating current diet. Pain tolerable with medications. Afebrile. WBC (11.0H) yesterday no new labs were ordered. Pt cleared from ID stand point to be discharged with Ceftin 500mg po Q12H for 5 days. H/H:11.3L/32.7L. 3/13/2017: s/p left sided TLIF L4/5  3/14/2017: POD#1 pain controlled with PCA, drains in place, PT consulted. Had an episode of hypotension, was ordered for bolus of LR, with hospitalist consulted.  3/15/2017: POD#2 Mild pain, No LE issues, Drains in place left in place, Not Ambulating yet  ID saw and started empirical cefepine for fever cough and evaluation of pneumonia PCA removed  3/16/2017: POD#3  POD # 3 S/ P TLIF Left L4-5  Patient seen and examined, up ambulating with a walker, notes improvement in groin pain which was severe at pre-op.  Afebrile, Dressing changed, drains x 2 removed, 10 / 25 cc - wound looks good. Neuro remains intact,  Pain tolerable with po analgesics. Labs reviewed wbc 11. Continue to encourage ambulation with walker / physical therapy. Overall she is doing well, and continues to progress in post op course,    3/17/2017: POD#4. Pt seen resting at bedside. She has left groin pain and incisional site pain. Pt states left groin pain improved since surgery. She denies weakness and numbness of b/l lower extremities. Voids on own without complications. Motor and neuro intact. Incisional site with dry drainage on dressing, no active drainage. Dressing changed. Tolerating current diet. Pain tolerable with medications. Afebrile. WBC (11.0H) yesterday no new labs were ordered. Pt cleared from ID stand point to be discharged with Ceftin 500mg po Q12H for 5 days. H/H:11.3L/32.7L.  Pt has post-op medications at home prescribed pre-op

## 2017-03-17 NOTE — DISCHARGE NOTE ADULT - ADDITIONAL INSTRUCTIONS
Keep incisional site clean and dry. Avoid soaking. Avoid lifting, bending, twisting.  F/u office in 1 week.

## 2017-03-17 NOTE — PROGRESS NOTE ADULT - ASSESSMENT
56 yo F with PMH significant for chronic back pain now s/p laminectomy lumbar fusion of L3-L4 POD #1. Medicine consult called for eval. of post op fever and hypotension    # Back Pain S/p Laminectomy/fusion/PLIF L3- L4 ( 3/13)  - Management as per primary team  - Post Op  pain meds, AC as per primary team.    #  Post -Op Fever most likely 2ndry atelectasis   - RVP panel : negative  - CXR  - Clear  - UA - neg  - continue cefepime, switch to ceftin X 5 more days for possible early PNA  - ID consult appreciated   - Incentive Spirometry  - Cough meds PRN  - Tylenol PRN for pain and fever  - Monitor CBC#  Fever s/p laminectomy/Fusion/PLIF L3-L4 , POD #1   - Stat CBC/BMP/Bcx /CXR  - RVP panel : negative  - Leucocytosis, fever and cough - likely PNA, will start IV Cefepime and Vanco  - ID consult  - Incentive Spirometry  - Cough meds PRN  - Tylenol PRN for pain and fever  - Monitor CBC  #Hypotension most Likely 2/2 to Dilaudid PCA - resolved  - S/P IVF and Monitor BP.       #Prophylactic measure.    -DVT Ppx  as per primary team.    Thank you for this courtesy consult

## 2017-03-17 NOTE — PROGRESS NOTE ADULT - SUBJECTIVE AND OBJECTIVE BOX
HPI:Patient is 57 year old female with past medical history prior laminectomy now admitted on 3/13 and underwent elective L3-L4 laminectomy. Post op patient on 3/14 had fever and low blood pressure and associated cough, mild shortness of breath; no other specific complaints; was not ill prior to surgery.  Today 3/16 patient comfortable, less cough; had low grade fever late yesterday afternoon, none since  Today 3/17 patient feeling good, less cough    MEDICATIONS  (STANDING):  sodium chloride 0.9% lock flush 3milliLiter(s) IV Push every 8 hours  lactated ringers. 1000milliLiter(s) IV Continuous <Continuous>  influenza   Vaccine 0.5milliLiter(s) IntraMuscular once  lactated ringers. 1000milliLiter(s) IV Continuous <Continuous>  cyclobenzaprine 10milliGRAM(s) Oral every 8 hours  docusate sodium 100milliGRAM(s) Oral three times a day  mupirocin 2% Ointment 1Application(s) Topical two times a day  sodium chloride 0.9%. 500milliLiter(s) IV Continuous <Continuous>  cefepime  IVPB  IV Intermittent   cefepime  IVPB 2000milliGRAM(s) IV Intermittent every 8 hours    MEDICATIONS  (PRN):  HYDROmorphone PCA (1 mG/mL) Rescue Clinician Bolus 0.5milliGRAM(s) IV Push every 15 minutes PRN for Pain Scale GREATER THAN 6  naloxone Injectable 0.1milliGRAM(s) IV Push every 3 minutes PRN For ANY of the following changes in patient status:  A. RR LESS THAN 10 breaths per minute, B. Oxygen saturation LESS THAN 90%, C. Sedation score of 6  ondansetron Injectable 4milliGRAM(s) IV Push every 6 hours PRN Nausea  acetaminophen   Tablet 650milliGRAM(s) Oral every 6 hours PRN For Temp greater than 38.5 C (101.3 F)  HYDROmorphone  Injectable 1milliGRAM(s) SubCutaneous every 6 hours PRN Moderate Pain  ondansetron   Disintegrating Tablet 4milliGRAM(s) Oral every 4 hours PRN Nausea  HYDROmorphone PCA (1 mG/mL) Rescue Clinician Bolus 0.5milliGRAM(s) IV Push every 15 minutes PRN for Pain Scale GREATER THAN 6  oxyCODONE  5 mG/acetaminophen 325 mG 1Tablet(s) Oral every 4 hours PRN Mild Pain (1 - 3)  oxyCODONE  5 mG/acetaminophen 325 mG 2Tablet(s) Oral every 4 hours PRN Moderate Pain (4 - 6)      Vital Signs Last 24 Hrs  T(C): 36.8, Max: 37.5 (-16 @ 21:00)  T(F): 98.3, Max: 99.5 (03-16 @ 21:00)  HR: 75 (72 - 76)  BP: 120/72 (100/65 - 120/72)  BP(mean): --  RR: 18 (16 - 18)  SpO2: 98% (98% - 100%)    Physical Exam:            Constitutional: well developed  HEENT: NC/AT, EOMI, PERRLA  Neck: supple  Respiratory: clear  Cardiovascular: S1S2 regular, no murmurs  Abdomen: soft, not tender, not distended, positive BS  Genitourinary: deferred  Rectal: deferred  Musculoskeletal: no muscle tenderness, no joint swelling or tenderness  Neurological: AxOx3, moving all extremities, no focal deficits  Skin: no rashes                          10.5   11.0  )-----------( 248      ( 15 Mar 2017 06:51 )             31.6     15 Mar 2017 06:51    143    |  106    |  9      ----------------------------<  88     4.2     |  30     |  0.66     Ca    8.1        15 Mar 2017 06:51  Mg     1.6       14 Mar 2017 20:38         Urinalysis Basic - ( 14 Mar 2017 23:13 )    Color: Yellow / Appearance: Clear / S.015 / pH: x  Gluc: x / Ketone: Negative  / Bili: Negative / Urobili: 1 mg/dL   Blood: x / Protein: Negative mg/dL / Nitrite: Negative   Leuk Esterase: Trace / RBC: 3-5 /HPF / WBC 3-5   Sq Epi: x / Non Sq Epi: Occasional / Bacteria: Occasional          Radiology:    Advanced directive addressed: full resuscitation

## 2017-03-17 NOTE — DISCHARGE NOTE ADULT - PLAN OF CARE
Improve pain, prevent progression Avoid soaking of incisional site. Keep incisional site clean and dry. avoid twisting, bending, lifting. Continue analgesics. F/u office in 1 week

## 2017-03-17 NOTE — DISCHARGE NOTE ADULT - PATIENT PORTAL LINK FT
“You can access the [facility] Patient Portal, offered by [facility], by registering with the following website: [email or web address]”

## 2017-03-17 NOTE — DISCHARGE NOTE ADULT - CARE PROVIDER_API CALL
Coreen Madrigal), Orthopaedic Surgery  [address] [place] [address]  Phone: [phone]  Fax: [phone]

## 2017-03-17 NOTE — DISCHARGE NOTE ADULT - MEDICATION SUMMARY - MEDICATIONS TO TAKE
I will START or STAY ON the medications listed below when I get home from the hospital:    mupirocin 2% topical ointment  -- 1 application on skin 2 times a day  -- Indication: For Prophylactic measure    docusate sodium 100 mg oral capsule  -- 1 cap(s) by mouth 3 times a day  -- Indication: For constipation    cyclobenzaprine 10 mg oral tablet  -- 1 tab(s) by mouth every 8 hours  -- Indication: For S/P lumbar fusion    chondroitin-glucosamine  -- 2 cap(s) by mouth 2 times a day  -- Indication: For Prophylactic measure I will START or STAY ON the medications listed below when I get home from the hospital:    Ceftin 500 mg oral tablet  -- 1 tab(s) by mouth 2 times a day  -- Finish all this medication unless otherwise directed by prescriber.  Medication should be taken with plenty of water.  Take with food or milk.    -- Indication: For abx    mupirocin 2% topical ointment  -- 1 application on skin 2 times a day  -- Indication: For Prophylactic measure    docusate sodium 100 mg oral capsule  -- 1 cap(s) by mouth 3 times a day  -- Indication: For constipation    cyclobenzaprine 10 mg oral tablet  -- 1 tab(s) by mouth every 8 hours  -- Indication: For S/P lumbar fusion    chondroitin-glucosamine  -- 2 cap(s) by mouth 2 times a day  -- Indication: For Prophylactic measure

## 2017-03-17 NOTE — DISCHARGE NOTE ADULT - CARE PLAN
Principal Discharge DX:	S/P lumbar fusion  Goal:	Improve pain, prevent progression  Instructions for follow-up, activity and diet:	Avoid soaking of incisional site. Keep incisional site clean and dry. avoid twisting, bending, lifting. Continue analgesics. F/u office in 1 week

## 2017-03-17 NOTE — PROGRESS NOTE ADULT - ASSESSMENT
:Patient is 57 year old female with past medical history prior laminectomy now admitted on 3/13 and underwent elective L3-L4 laminectomy. Post op patient on 3/14 had fever and low blood pressure and associated cough, mild shortness of breath; no other specific complaints; was not ill prior to surgery.  1. Patient with post op fever  -possible early pneumonia versus atelectasis upon my review of chest xray which is not officially read  - okay from id standpoint to discharge home on ceftin 500 mg po q 12 hours for 5 more days  - tolerating antibiotics without rashes or side effects   will follow

## 2017-03-20 DIAGNOSIS — M51.36 OTHER INTERVERTEBRAL DISC DEGENERATION, LUMBAR REGION: ICD-10-CM

## 2017-03-20 DIAGNOSIS — Z96.641 PRESENCE OF RIGHT ARTIFICIAL HIP JOINT: ICD-10-CM

## 2017-03-20 DIAGNOSIS — Z87.891 PERSONAL HISTORY OF NICOTINE DEPENDENCE: ICD-10-CM

## 2017-03-20 DIAGNOSIS — T40.2X5A ADVERSE EFFECT OF OTHER OPIOIDS, INITIAL ENCOUNTER: ICD-10-CM

## 2017-03-20 DIAGNOSIS — M51.16 INTERVERTEBRAL DISC DISORDERS WITH RADICULOPATHY, LUMBAR REGION: ICD-10-CM

## 2017-03-20 DIAGNOSIS — M48.06 SPINAL STENOSIS, LUMBAR REGION: ICD-10-CM

## 2017-03-20 DIAGNOSIS — I95.2 HYPOTENSION DUE TO DRUGS: ICD-10-CM

## 2017-03-20 DIAGNOSIS — M51.26 OTHER INTERVERTEBRAL DISC DISPLACEMENT, LUMBAR REGION: ICD-10-CM

## 2017-03-20 DIAGNOSIS — J98.11 ATELECTASIS: ICD-10-CM

## 2017-03-20 DIAGNOSIS — M43.16 SPONDYLOLISTHESIS, LUMBAR REGION: ICD-10-CM

## 2017-03-20 DIAGNOSIS — R50.82 POSTPROCEDURAL FEVER: ICD-10-CM

## 2017-03-20 DIAGNOSIS — I95.81 POSTPROCEDURAL HYPOTENSION: ICD-10-CM

## 2017-03-20 LAB
CULTURE RESULTS: SIGNIFICANT CHANGE UP
SPECIMEN SOURCE: SIGNIFICANT CHANGE UP

## 2017-03-21 DIAGNOSIS — J18.9 PNEUMONIA, UNSPECIFIED ORGANISM: ICD-10-CM

## 2019-09-09 PROBLEM — M54.9 DORSALGIA, UNSPECIFIED: Chronic | Status: ACTIVE | Noted: 2017-03-07

## 2019-09-10 ENCOUNTER — OUTPATIENT (OUTPATIENT)
Dept: OUTPATIENT SERVICES | Facility: HOSPITAL | Age: 59
LOS: 1 days | End: 2019-09-10
Payer: COMMERCIAL

## 2019-09-10 DIAGNOSIS — Z96.642 PRESENCE OF LEFT ARTIFICIAL HIP JOINT: Chronic | ICD-10-CM

## 2019-09-10 DIAGNOSIS — Z98.1 ARTHRODESIS STATUS: Chronic | ICD-10-CM

## 2019-09-10 DIAGNOSIS — Z98.890 OTHER SPECIFIED POSTPROCEDURAL STATES: Chronic | ICD-10-CM

## 2019-09-10 DIAGNOSIS — Z90.89 ACQUIRED ABSENCE OF OTHER ORGANS: Chronic | ICD-10-CM

## 2019-09-10 DIAGNOSIS — S32.020S WEDGE COMPRESSION FRACTURE OF SECOND LUMBAR VERTEBRA, SEQUELA: ICD-10-CM

## 2019-09-10 DIAGNOSIS — Z96.641 PRESENCE OF RIGHT ARTIFICIAL HIP JOINT: Chronic | ICD-10-CM

## 2019-09-10 DIAGNOSIS — Z01.818 ENCOUNTER FOR OTHER PREPROCEDURAL EXAMINATION: ICD-10-CM

## 2019-09-10 LAB
ANION GAP SERPL CALC-SCNC: 8 MMOL/L — SIGNIFICANT CHANGE UP (ref 5–17)
BASOPHILS # BLD AUTO: 0.05 K/UL — SIGNIFICANT CHANGE UP (ref 0–0.2)
BASOPHILS NFR BLD AUTO: 0.6 % — SIGNIFICANT CHANGE UP (ref 0–2)
BUN SERPL-MCNC: 17 MG/DL — SIGNIFICANT CHANGE UP (ref 7–23)
CALCIUM SERPL-MCNC: 9.5 MG/DL — SIGNIFICANT CHANGE UP (ref 8.5–10.1)
CHLORIDE SERPL-SCNC: 106 MMOL/L — SIGNIFICANT CHANGE UP (ref 96–108)
CO2 SERPL-SCNC: 27 MMOL/L — SIGNIFICANT CHANGE UP (ref 22–31)
CREAT SERPL-MCNC: 0.66 MG/DL — SIGNIFICANT CHANGE UP (ref 0.5–1.3)
EOSINOPHIL # BLD AUTO: 0.33 K/UL — SIGNIFICANT CHANGE UP (ref 0–0.5)
EOSINOPHIL NFR BLD AUTO: 3.9 % — SIGNIFICANT CHANGE UP (ref 0–6)
GLUCOSE SERPL-MCNC: 100 MG/DL — HIGH (ref 70–99)
HCT VFR BLD CALC: 37 % — SIGNIFICANT CHANGE UP (ref 34.5–45)
HGB BLD-MCNC: 12.3 G/DL — SIGNIFICANT CHANGE UP (ref 11.5–15.5)
IMM GRANULOCYTES NFR BLD AUTO: 0.2 % — SIGNIFICANT CHANGE UP (ref 0–1.5)
LYMPHOCYTES # BLD AUTO: 2.63 K/UL — SIGNIFICANT CHANGE UP (ref 1–3.3)
LYMPHOCYTES # BLD AUTO: 30.8 % — SIGNIFICANT CHANGE UP (ref 13–44)
MCHC RBC-ENTMCNC: 31.5 PG — SIGNIFICANT CHANGE UP (ref 27–34)
MCHC RBC-ENTMCNC: 33.2 GM/DL — SIGNIFICANT CHANGE UP (ref 32–36)
MCV RBC AUTO: 94.6 FL — SIGNIFICANT CHANGE UP (ref 80–100)
MONOCYTES # BLD AUTO: 0.68 K/UL — SIGNIFICANT CHANGE UP (ref 0–0.9)
MONOCYTES NFR BLD AUTO: 8 % — SIGNIFICANT CHANGE UP (ref 2–14)
NEUTROPHILS # BLD AUTO: 4.84 K/UL — SIGNIFICANT CHANGE UP (ref 1.8–7.4)
NEUTROPHILS NFR BLD AUTO: 56.5 % — SIGNIFICANT CHANGE UP (ref 43–77)
PLATELET # BLD AUTO: 297 K/UL — SIGNIFICANT CHANGE UP (ref 150–400)
POTASSIUM SERPL-MCNC: 4.7 MMOL/L — SIGNIFICANT CHANGE UP (ref 3.5–5.3)
POTASSIUM SERPL-SCNC: 4.7 MMOL/L — SIGNIFICANT CHANGE UP (ref 3.5–5.3)
RBC # BLD: 3.91 M/UL — SIGNIFICANT CHANGE UP (ref 3.8–5.2)
RBC # FLD: 14.1 % — SIGNIFICANT CHANGE UP (ref 10.3–14.5)
SODIUM SERPL-SCNC: 141 MMOL/L — SIGNIFICANT CHANGE UP (ref 135–145)
WBC # BLD: 8.55 K/UL — SIGNIFICANT CHANGE UP (ref 3.8–10.5)
WBC # FLD AUTO: 8.55 K/UL — SIGNIFICANT CHANGE UP (ref 3.8–10.5)

## 2019-09-10 PROCEDURE — 36415 COLL VENOUS BLD VENIPUNCTURE: CPT

## 2019-09-10 PROCEDURE — 80048 BASIC METABOLIC PNL TOTAL CA: CPT

## 2019-09-10 PROCEDURE — 93005 ELECTROCARDIOGRAM TRACING: CPT

## 2019-09-10 PROCEDURE — 85025 COMPLETE CBC W/AUTO DIFF WBC: CPT

## 2019-09-10 PROCEDURE — 93010 ELECTROCARDIOGRAM REPORT: CPT

## 2019-09-10 RX ORDER — GLUCOSAMINE HCL/CHONDROITIN SU 500-400 MG
2 CAPSULE ORAL
Qty: 0 | Refills: 0 | DISCHARGE

## 2019-09-10 NOTE — CHART NOTE - NSCHARTNOTEFT_GEN_A_CORE
59 year old female presents to PST for planned L1 kyphoplasty    Plan:  1. PST instructions given ; NPO post midnight   2. Pt instructed to take following meds with sip of water :  losartan levothyroxine   3. EZ wash instructions given   4. Medical Optimization  with Dr Flori Beck  5. Stop NSAIDS ( Aspirin Alev Motrin Mobic Diclofenac), herbal supplements , MVI , Vitamin fish oil 7 days prior to surgery  unless directed by surgeon or cardiologist;   6. Labs EKG as per surgeon request 59 year old female presents to PST for planned L1 kyphoplasty    Plan:  1. PST instructions given ; NPO post midnight   2. Pt instructed to take following meds with sip of water :  losartan levothyroxine   3. EZ wash instructions given   4. Medical Optimization  with Dr Flori Beck  5. Stop NSAIDS ( Aspirin Alev Motrin Mobic Diclofenac), herbal supplements , MVI , Vitamin fish oil 7 days prior to surgery  unless directed by surgeon or cardiologist;   6. Labs EKG as per surgeon request  7. Opiate dependent OR booking notified Dena

## 2019-09-10 NOTE — ASU PATIENT PROFILE, ADULT - PSH
History of tonsillectomy    S/P arthroscopy of right knee  7/2016 and done prior about 10 years ago  S/P cervical discectomy  5/2013  S/P hip replacement, left  2017  S/P hip replacement, right  2012  S/P insertion of spinal cord stimulator  10/2017  S/P lumbar fusion  7/2012 .3/ 2017

## 2019-09-10 NOTE — ASU PATIENT PROFILE, ADULT - PMH
Back pain    Fracture  Lumbar 1  Hashimoto's disease    Herniated nucleus pulposus, lumbar    Herniated nucleus pulposus, thoracic    HLD (hyperlipidemia)  diet controlled  HTN (hypertension)    Osteoporosis

## 2019-09-11 DIAGNOSIS — Z01.818 ENCOUNTER FOR OTHER PREPROCEDURAL EXAMINATION: ICD-10-CM

## 2019-09-11 DIAGNOSIS — S32.020S WEDGE COMPRESSION FRACTURE OF SECOND LUMBAR VERTEBRA, SEQUELA: ICD-10-CM

## 2019-09-12 ENCOUNTER — OUTPATIENT (OUTPATIENT)
Dept: INPATIENT UNIT | Facility: HOSPITAL | Age: 59
LOS: 1 days | Discharge: HOME | End: 2019-09-12
Payer: COMMERCIAL

## 2019-09-12 VITALS
SYSTOLIC BLOOD PRESSURE: 108 MMHG | HEART RATE: 55 BPM | RESPIRATION RATE: 16 BRPM | DIASTOLIC BLOOD PRESSURE: 60 MMHG | TEMPERATURE: 98 F | OXYGEN SATURATION: 100 %

## 2019-09-12 VITALS
HEIGHT: 65 IN | OXYGEN SATURATION: 99 % | WEIGHT: 156.97 LBS | DIASTOLIC BLOOD PRESSURE: 79 MMHG | RESPIRATION RATE: 14 BRPM | SYSTOLIC BLOOD PRESSURE: 115 MMHG | HEART RATE: 65 BPM

## 2019-09-12 DIAGNOSIS — Z96.641 PRESENCE OF RIGHT ARTIFICIAL HIP JOINT: Chronic | ICD-10-CM

## 2019-09-12 DIAGNOSIS — Z98.1 ARTHRODESIS STATUS: Chronic | ICD-10-CM

## 2019-09-12 DIAGNOSIS — Z96.642 PRESENCE OF LEFT ARTIFICIAL HIP JOINT: Chronic | ICD-10-CM

## 2019-09-12 DIAGNOSIS — Z90.89 ACQUIRED ABSENCE OF OTHER ORGANS: Chronic | ICD-10-CM

## 2019-09-12 DIAGNOSIS — Z98.890 OTHER SPECIFIED POSTPROCEDURAL STATES: Chronic | ICD-10-CM

## 2019-09-12 DIAGNOSIS — S32.020S WEDGE COMPRESSION FRACTURE OF SECOND LUMBAR VERTEBRA, SEQUELA: ICD-10-CM

## 2019-09-12 PROCEDURE — 76000 FLUOROSCOPY <1 HR PHYS/QHP: CPT

## 2019-09-12 PROCEDURE — C1726: CPT

## 2019-09-12 PROCEDURE — C1713: CPT

## 2019-09-12 RX ORDER — OXYCODONE HYDROCHLORIDE 5 MG/1
10 TABLET ORAL ONCE
Refills: 0 | Status: DISCONTINUED | OUTPATIENT
Start: 2019-09-12 | End: 2019-09-12

## 2019-09-12 RX ORDER — HYDROMORPHONE HYDROCHLORIDE 2 MG/ML
0.5 INJECTION INTRAMUSCULAR; INTRAVENOUS; SUBCUTANEOUS
Refills: 0 | Status: DISCONTINUED | OUTPATIENT
Start: 2019-09-12 | End: 2019-09-12

## 2019-09-12 RX ORDER — FENTANYL CITRATE 50 UG/ML
50 INJECTION INTRAVENOUS
Refills: 0 | Status: DISCONTINUED | OUTPATIENT
Start: 2019-09-12 | End: 2019-09-12

## 2019-09-12 RX ORDER — SODIUM CHLORIDE 9 MG/ML
1000 INJECTION, SOLUTION INTRAVENOUS
Refills: 0 | Status: DISCONTINUED | OUTPATIENT
Start: 2019-09-12 | End: 2019-09-12

## 2019-09-12 RX ADMIN — FENTANYL CITRATE 50 MICROGRAM(S): 50 INJECTION INTRAVENOUS at 14:40

## 2019-09-12 RX ADMIN — OXYCODONE HYDROCHLORIDE 10 MILLIGRAM(S): 5 TABLET ORAL at 14:40

## 2019-09-12 RX ADMIN — FENTANYL CITRATE 50 MICROGRAM(S): 50 INJECTION INTRAVENOUS at 14:30

## 2019-09-12 RX ADMIN — FENTANYL CITRATE 50 MICROGRAM(S): 50 INJECTION INTRAVENOUS at 14:45

## 2019-09-12 RX ADMIN — OXYCODONE HYDROCHLORIDE 10 MILLIGRAM(S): 5 TABLET ORAL at 14:30

## 2019-09-12 RX ADMIN — SODIUM CHLORIDE 75 MILLILITER(S): 9 INJECTION, SOLUTION INTRAVENOUS at 14:38

## 2019-09-12 NOTE — ASU DISCHARGE PLAN (ADULT/PEDIATRIC) - CARE PROVIDER_API CALL
Ed Singer)  Anesthesiology; Pain Medicine  [address] [place] [address]  Phone: [phone]  Fax: [phone]  Follow Up Time:

## 2019-09-17 DIAGNOSIS — M48.56XA COLLAPSED VERTEBRA, NOT ELSEWHERE CLASSIFIED, LUMBAR REGION, INITIAL ENCOUNTER FOR FRACTURE: ICD-10-CM

## 2019-09-17 DIAGNOSIS — Z88.0 ALLERGY STATUS TO PENICILLIN: ICD-10-CM

## 2019-09-17 DIAGNOSIS — I10 ESSENTIAL (PRIMARY) HYPERTENSION: ICD-10-CM

## 2019-09-17 DIAGNOSIS — Z98.1 ARTHRODESIS STATUS: ICD-10-CM

## 2019-09-17 DIAGNOSIS — E03.9 HYPOTHYROIDISM, UNSPECIFIED: ICD-10-CM

## 2019-09-17 DIAGNOSIS — Z91.041 RADIOGRAPHIC DYE ALLERGY STATUS: ICD-10-CM

## 2019-12-09 ENCOUNTER — INPATIENT (INPATIENT)
Facility: HOSPITAL | Age: 59
LOS: 3 days | Discharge: HOME | DRG: 392 | End: 2019-12-13
Attending: FAMILY MEDICINE | Admitting: FAMILY MEDICINE
Payer: COMMERCIAL

## 2019-12-09 VITALS — WEIGHT: 143.08 LBS | HEIGHT: 65 IN

## 2019-12-09 DIAGNOSIS — K52.9 NONINFECTIVE GASTROENTERITIS AND COLITIS, UNSPECIFIED: ICD-10-CM

## 2019-12-09 DIAGNOSIS — Z96.641 PRESENCE OF RIGHT ARTIFICIAL HIP JOINT: Chronic | ICD-10-CM

## 2019-12-09 DIAGNOSIS — Z98.890 OTHER SPECIFIED POSTPROCEDURAL STATES: Chronic | ICD-10-CM

## 2019-12-09 DIAGNOSIS — Z98.1 ARTHRODESIS STATUS: Chronic | ICD-10-CM

## 2019-12-09 DIAGNOSIS — Z96.642 PRESENCE OF LEFT ARTIFICIAL HIP JOINT: Chronic | ICD-10-CM

## 2019-12-09 DIAGNOSIS — Z90.89 ACQUIRED ABSENCE OF OTHER ORGANS: Chronic | ICD-10-CM

## 2019-12-09 PROBLEM — M51.24 OTHER INTERVERTEBRAL DISC DISPLACEMENT, THORACIC REGION: Chronic | Status: ACTIVE | Noted: 2019-09-10

## 2019-12-09 PROBLEM — M81.0 AGE-RELATED OSTEOPOROSIS WITHOUT CURRENT PATHOLOGICAL FRACTURE: Chronic | Status: ACTIVE | Noted: 2019-09-10

## 2019-12-09 PROBLEM — M51.26 OTHER INTERVERTEBRAL DISC DISPLACEMENT, LUMBAR REGION: Chronic | Status: ACTIVE | Noted: 2019-09-10

## 2019-12-09 PROBLEM — I10 ESSENTIAL (PRIMARY) HYPERTENSION: Chronic | Status: ACTIVE | Noted: 2019-09-10

## 2019-12-09 PROBLEM — T14.8XXA OTHER INJURY OF UNSPECIFIED BODY REGION, INITIAL ENCOUNTER: Chronic | Status: ACTIVE | Noted: 2019-09-10

## 2019-12-09 PROBLEM — E78.5 HYPERLIPIDEMIA, UNSPECIFIED: Chronic | Status: ACTIVE | Noted: 2019-09-10

## 2019-12-09 PROBLEM — E06.3 AUTOIMMUNE THYROIDITIS: Chronic | Status: ACTIVE | Noted: 2019-09-10

## 2019-12-09 LAB
ALBUMIN SERPL ELPH-MCNC: 4 G/DL — SIGNIFICANT CHANGE UP (ref 3.3–5)
ALP SERPL-CCNC: 83 U/L — SIGNIFICANT CHANGE UP (ref 40–120)
ALT FLD-CCNC: 24 U/L — SIGNIFICANT CHANGE UP (ref 12–78)
ANION GAP SERPL CALC-SCNC: 8 MMOL/L — SIGNIFICANT CHANGE UP (ref 5–17)
APPEARANCE UR: CLEAR — SIGNIFICANT CHANGE UP
AST SERPL-CCNC: 27 U/L — SIGNIFICANT CHANGE UP (ref 15–37)
BASOPHILS # BLD AUTO: 0.04 K/UL — SIGNIFICANT CHANGE UP (ref 0–0.2)
BASOPHILS NFR BLD AUTO: 0.2 % — SIGNIFICANT CHANGE UP (ref 0–2)
BILIRUB SERPL-MCNC: 0.6 MG/DL — SIGNIFICANT CHANGE UP (ref 0.2–1.2)
BILIRUB UR-MCNC: NEGATIVE — SIGNIFICANT CHANGE UP
BUN SERPL-MCNC: 9 MG/DL — SIGNIFICANT CHANGE UP (ref 7–23)
CALCIUM SERPL-MCNC: 9.6 MG/DL — SIGNIFICANT CHANGE UP (ref 8.5–10.1)
CHLORIDE SERPL-SCNC: 102 MMOL/L — SIGNIFICANT CHANGE UP (ref 96–108)
CO2 SERPL-SCNC: 26 MMOL/L — SIGNIFICANT CHANGE UP (ref 22–31)
COLOR SPEC: YELLOW — SIGNIFICANT CHANGE UP
CREAT SERPL-MCNC: 0.76 MG/DL — SIGNIFICANT CHANGE UP (ref 0.5–1.3)
DIFF PNL FLD: ABNORMAL
EOSINOPHIL # BLD AUTO: 0.14 K/UL — SIGNIFICANT CHANGE UP (ref 0–0.5)
EOSINOPHIL NFR BLD AUTO: 0.8 % — SIGNIFICANT CHANGE UP (ref 0–6)
GLUCOSE SERPL-MCNC: 107 MG/DL — HIGH (ref 70–99)
GLUCOSE UR QL: NEGATIVE MG/DL — SIGNIFICANT CHANGE UP
HCT VFR BLD CALC: 41.5 % — SIGNIFICANT CHANGE UP (ref 34.5–45)
HGB BLD-MCNC: 14.1 G/DL — SIGNIFICANT CHANGE UP (ref 11.5–15.5)
IMM GRANULOCYTES NFR BLD AUTO: 0.4 % — SIGNIFICANT CHANGE UP (ref 0–1.5)
KETONES UR-MCNC: ABNORMAL
LACTATE SERPL-SCNC: 1.6 MMOL/L — SIGNIFICANT CHANGE UP (ref 0.7–2)
LEUKOCYTE ESTERASE UR-ACNC: ABNORMAL
LIDOCAIN IGE QN: 66 U/L — LOW (ref 73–393)
LYMPHOCYTES # BLD AUTO: 1.58 K/UL — SIGNIFICANT CHANGE UP (ref 1–3.3)
LYMPHOCYTES # BLD AUTO: 8.6 % — LOW (ref 13–44)
MCHC RBC-ENTMCNC: 31.1 PG — SIGNIFICANT CHANGE UP (ref 27–34)
MCHC RBC-ENTMCNC: 34 GM/DL — SIGNIFICANT CHANGE UP (ref 32–36)
MCV RBC AUTO: 91.4 FL — SIGNIFICANT CHANGE UP (ref 80–100)
MONOCYTES # BLD AUTO: 1.17 K/UL — HIGH (ref 0–0.9)
MONOCYTES NFR BLD AUTO: 6.4 % — SIGNIFICANT CHANGE UP (ref 2–14)
NEUTROPHILS # BLD AUTO: 15.4 K/UL — HIGH (ref 1.8–7.4)
NEUTROPHILS NFR BLD AUTO: 83.6 % — HIGH (ref 43–77)
NITRITE UR-MCNC: NEGATIVE — SIGNIFICANT CHANGE UP
PH UR: 5 — SIGNIFICANT CHANGE UP (ref 5–8)
PLATELET # BLD AUTO: 402 K/UL — HIGH (ref 150–400)
POTASSIUM SERPL-MCNC: 3.4 MMOL/L — LOW (ref 3.5–5.3)
POTASSIUM SERPL-SCNC: 3.4 MMOL/L — LOW (ref 3.5–5.3)
PROT SERPL-MCNC: 8.2 GM/DL — SIGNIFICANT CHANGE UP (ref 6–8.3)
PROT UR-MCNC: 30 MG/DL
RBC # BLD: 4.54 M/UL — SIGNIFICANT CHANGE UP (ref 3.8–5.2)
RBC # FLD: 12.6 % — SIGNIFICANT CHANGE UP (ref 10.3–14.5)
SODIUM SERPL-SCNC: 136 MMOL/L — SIGNIFICANT CHANGE UP (ref 135–145)
SP GR SPEC: 1.02 — SIGNIFICANT CHANGE UP (ref 1.01–1.02)
UROBILINOGEN FLD QL: NEGATIVE MG/DL — SIGNIFICANT CHANGE UP
WBC # BLD: 18.4 K/UL — HIGH (ref 3.8–10.5)
WBC # FLD AUTO: 18.4 K/UL — HIGH (ref 3.8–10.5)

## 2019-12-09 PROCEDURE — 80053 COMPREHEN METABOLIC PANEL: CPT

## 2019-12-09 PROCEDURE — 74176 CT ABD & PELVIS W/O CONTRAST: CPT | Mod: 26

## 2019-12-09 PROCEDURE — 80048 BASIC METABOLIC PNL TOTAL CA: CPT

## 2019-12-09 PROCEDURE — 87086 URINE CULTURE/COLONY COUNT: CPT

## 2019-12-09 PROCEDURE — G0008: CPT

## 2019-12-09 PROCEDURE — 85025 COMPLETE CBC W/AUTO DIFF WBC: CPT

## 2019-12-09 PROCEDURE — 88305 TISSUE EXAM BY PATHOLOGIST: CPT

## 2019-12-09 PROCEDURE — 36415 COLL VENOUS BLD VENIPUNCTURE: CPT

## 2019-12-09 PROCEDURE — 90686 IIV4 VACC NO PRSV 0.5 ML IM: CPT

## 2019-12-09 PROCEDURE — 86803 HEPATITIS C AB TEST: CPT

## 2019-12-09 RX ORDER — MORPHINE SULFATE 50 MG/1
4 CAPSULE, EXTENDED RELEASE ORAL ONCE
Refills: 0 | Status: DISCONTINUED | OUTPATIENT
Start: 2019-12-09 | End: 2019-12-09

## 2019-12-09 RX ORDER — POLYETHYLENE GLYCOL 3350 17 G/17G
17 POWDER, FOR SOLUTION ORAL
Refills: 0 | Status: DISCONTINUED | OUTPATIENT
Start: 2019-12-09 | End: 2019-12-10

## 2019-12-09 RX ORDER — ONDANSETRON 8 MG/1
4 TABLET, FILM COATED ORAL ONCE
Refills: 0 | Status: COMPLETED | OUTPATIENT
Start: 2019-12-09 | End: 2019-12-09

## 2019-12-09 RX ORDER — TRAMADOL HYDROCHLORIDE 50 MG/1
50 TABLET ORAL EVERY 12 HOURS
Refills: 0 | Status: DISCONTINUED | OUTPATIENT
Start: 2019-12-09 | End: 2019-12-13

## 2019-12-09 RX ORDER — SODIUM CHLORIDE 9 MG/ML
1000 INJECTION INTRAMUSCULAR; INTRAVENOUS; SUBCUTANEOUS ONCE
Refills: 0 | Status: COMPLETED | OUTPATIENT
Start: 2019-12-09 | End: 2019-12-09

## 2019-12-09 RX ORDER — SODIUM CHLORIDE 9 MG/ML
1000 INJECTION INTRAMUSCULAR; INTRAVENOUS; SUBCUTANEOUS
Refills: 0 | Status: DISCONTINUED | OUTPATIENT
Start: 2019-12-09 | End: 2019-12-11

## 2019-12-09 RX ORDER — LEVOTHYROXINE SODIUM 125 MCG
1 TABLET ORAL
Qty: 0 | Refills: 0 | DISCHARGE

## 2019-12-09 RX ORDER — OXYCODONE HYDROCHLORIDE 5 MG/1
0 TABLET ORAL
Qty: 0 | Refills: 0 | DISCHARGE

## 2019-12-09 RX ORDER — CARISOPRODOL 250 MG
1 TABLET ORAL
Qty: 0 | Refills: 0 | DISCHARGE

## 2019-12-09 RX ORDER — LEVOTHYROXINE SODIUM 125 MCG
100 TABLET ORAL DAILY
Refills: 0 | Status: DISCONTINUED | OUTPATIENT
Start: 2019-12-09 | End: 2019-12-13

## 2019-12-09 RX ORDER — LOSARTAN POTASSIUM 100 MG/1
25 TABLET, FILM COATED ORAL DAILY
Refills: 0 | Status: DISCONTINUED | OUTPATIENT
Start: 2019-12-09 | End: 2019-12-13

## 2019-12-09 RX ORDER — METRONIDAZOLE 500 MG
500 TABLET ORAL EVERY 8 HOURS
Refills: 0 | Status: DISCONTINUED | OUTPATIENT
Start: 2019-12-09 | End: 2019-12-13

## 2019-12-09 RX ORDER — CIPROFLOXACIN LACTATE 400MG/40ML
400 VIAL (ML) INTRAVENOUS ONCE
Refills: 0 | Status: COMPLETED | OUTPATIENT
Start: 2019-12-09 | End: 2019-12-09

## 2019-12-09 RX ORDER — HEPARIN SODIUM 5000 [USP'U]/ML
5000 INJECTION INTRAVENOUS; SUBCUTANEOUS EVERY 12 HOURS
Refills: 0 | Status: DISCONTINUED | OUTPATIENT
Start: 2019-12-09 | End: 2019-12-13

## 2019-12-09 RX ORDER — FENTANYL CITRATE 50 UG/ML
1 INJECTION INTRAVENOUS
Refills: 0 | Status: DISCONTINUED | OUTPATIENT
Start: 2019-12-09 | End: 2019-12-13

## 2019-12-09 RX ORDER — MORPHINE SULFATE 50 MG/1
4 CAPSULE, EXTENDED RELEASE ORAL EVERY 4 HOURS
Refills: 0 | Status: DISCONTINUED | OUTPATIENT
Start: 2019-12-09 | End: 2019-12-13

## 2019-12-09 RX ORDER — CIPROFLOXACIN LACTATE 400MG/40ML
400 VIAL (ML) INTRAVENOUS
Refills: 0 | Status: DISCONTINUED | OUTPATIENT
Start: 2019-12-09 | End: 2019-12-13

## 2019-12-09 RX ORDER — GABAPENTIN 400 MG/1
1 CAPSULE ORAL
Qty: 0 | Refills: 0 | DISCHARGE

## 2019-12-09 RX ORDER — METRONIDAZOLE 500 MG
500 TABLET ORAL ONCE
Refills: 0 | Status: COMPLETED | OUTPATIENT
Start: 2019-12-09 | End: 2019-12-09

## 2019-12-09 RX ADMIN — MORPHINE SULFATE 4 MILLIGRAM(S): 50 CAPSULE, EXTENDED RELEASE ORAL at 21:35

## 2019-12-09 RX ADMIN — SODIUM CHLORIDE 1000 MILLILITER(S): 9 INJECTION INTRAMUSCULAR; INTRAVENOUS; SUBCUTANEOUS at 14:17

## 2019-12-09 RX ADMIN — SODIUM CHLORIDE 1000 MILLILITER(S): 9 INJECTION INTRAMUSCULAR; INTRAVENOUS; SUBCUTANEOUS at 14:42

## 2019-12-09 RX ADMIN — FENTANYL CITRATE 1 PATCH: 50 INJECTION INTRAVENOUS at 18:41

## 2019-12-09 RX ADMIN — Medication 200 MILLIGRAM(S): at 14:42

## 2019-12-09 RX ADMIN — MORPHINE SULFATE 4 MILLIGRAM(S): 50 CAPSULE, EXTENDED RELEASE ORAL at 11:17

## 2019-12-09 RX ADMIN — SODIUM CHLORIDE 1000 MILLILITER(S): 9 INJECTION INTRAMUSCULAR; INTRAVENOUS; SUBCUTANEOUS at 11:04

## 2019-12-09 RX ADMIN — MORPHINE SULFATE 4 MILLIGRAM(S): 50 CAPSULE, EXTENDED RELEASE ORAL at 21:19

## 2019-12-09 RX ADMIN — MORPHINE SULFATE 4 MILLIGRAM(S): 50 CAPSULE, EXTENDED RELEASE ORAL at 11:02

## 2019-12-09 RX ADMIN — SODIUM CHLORIDE 150 MILLILITER(S): 9 INJECTION INTRAMUSCULAR; INTRAVENOUS; SUBCUTANEOUS at 21:17

## 2019-12-09 RX ADMIN — ONDANSETRON 4 MILLIGRAM(S): 8 TABLET, FILM COATED ORAL at 13:10

## 2019-12-09 RX ADMIN — MORPHINE SULFATE 4 MILLIGRAM(S): 50 CAPSULE, EXTENDED RELEASE ORAL at 13:11

## 2019-12-09 RX ADMIN — POLYETHYLENE GLYCOL 3350 17 GRAM(S): 17 POWDER, FOR SOLUTION ORAL at 21:24

## 2019-12-09 RX ADMIN — Medication 200 MILLIGRAM(S): at 21:19

## 2019-12-09 RX ADMIN — FENTANYL CITRATE 1 PATCH: 50 INJECTION INTRAVENOUS at 19:00

## 2019-12-09 RX ADMIN — MORPHINE SULFATE 4 MILLIGRAM(S): 50 CAPSULE, EXTENDED RELEASE ORAL at 13:26

## 2019-12-09 RX ADMIN — TRAMADOL HYDROCHLORIDE 50 MILLIGRAM(S): 50 TABLET ORAL at 19:20

## 2019-12-09 RX ADMIN — TRAMADOL HYDROCHLORIDE 50 MILLIGRAM(S): 50 TABLET ORAL at 18:42

## 2019-12-09 NOTE — CONSULT NOTE ADULT - ASSESSMENT
60 yo female with stercoral colitis    -Recommend medical admission and observation  -Fluid resuscitation and bowel rest  -No surgical intervention at this time  -GI evaluation   -Stool softeners and enemas   -Monitor bowel function  -Pain control with non-narcotics    Discussed with surgical attending, Dr. Brunner

## 2019-12-09 NOTE — CONSULT NOTE ADULT - SUBJECTIVE AND OBJECTIVE BOX
60 yo female presents with worsening abdominal pain since Thanksgiving, 11/28/19. Patient states she has had intermittent diarrhea and constipation. Patient had been taking a stool softener and then began taking imodium for diarrhea. Patient states she has not had a proper bowel movement in the past week, small amounts of mucus liquid stools. Denies active flatus.     PMH: Hashimoto's thyroiditis   PSH: Spinal fusion, spinal stimulator implantation  PCN     Vital Signs Last 24 Hrs  T(C): 37.4 (09 Dec 2019 10:20), Max: 37.4 (09 Dec 2019 10:20)  T(F): 99.3 (09 Dec 2019 10:20), Max: 99.3 (09 Dec 2019 10:20)  HR: 86 (09 Dec 2019 10:20) (86 - 86)  BP: 158/97 (09 Dec 2019 10:20) (158/97 - 158/97)  BP(mean): --  RR: 18 (09 Dec 2019 10:20) (18 - 18)  SpO2: 100% (09 Dec 2019 10:20) (100% - 100%)    Exam:  Gen: AAOx3 in NAD  Cardio: RRR, S1/S2  Resp: Non-labored on RA  Abd: Soft, left sided abdominal tenderness, no guarding or signs of peritonitis  Ext: FROM, WWP  Rectal: Empty vault                          14.1   18.40 )-----------( 402      ( 09 Dec 2019 10:56 )             41.5   12-09    136  |  102  |  9   ----------------------------<  107<H>  3.4<L>   |  26  |  0.76    Ca    9.6      09 Dec 2019 10:56    TPro  8.2  /  Alb  4.0  /  TBili  0.6  /  DBili  x   /  AST  27  /  ALT  24  /  AlkPhos  83  12-09

## 2019-12-09 NOTE — ED STATDOCS - OBJECTIVE STATEMENT
60 y/o female with a PMHx of back pain, Hashimoto's, Herniated nucleus pulposus, HTN, HLD presents to the ED c/o abd pain x2 weeks. +intermittent diarrhea, +subjective fever. Denies urinary symptoms, n/v. Last colonoscopy 2 years ago resulting normal. Allergies: Penicillin, IV contrast. No other complaints at this time.

## 2019-12-09 NOTE — H&P ADULT - ASSESSMENT
Pt is admitted w/    stercoral colitis  Chronic constipation  Dehydration  Leukocytosis Pt is admitted w/    Stercoral colitis  Chronic constipation  Dehydration  Leukocytosis  - cont IVF  - clear liq  - cont Cipro flagyl  - add Miralax, consider enema tomorrow if ineffective  - apprec Surg consult with DR. Brunner  - GI and pain consults  - repeat labs  - DVT prophylaxis : heparin  - IMPROVE VTE Individual Risk Assessment    RISK                                                                Points    [  ] Previous VTE                                                  3    [  ] Thrombophilia                                               2    [  ] Lower limb paralysis                                      2        (unable to hold up >15 seconds)      [  ] Current Cancer                                              2         (within 6 months)    [  ] Immobilization > 24 hrs                                1    [  ] ICU/CCU stay > 24 hours                              1    [  ] Age > 60                                                      1    IMPROVE VTE Score _____1-2____    IMPROVE Score 0-1: Low Risk, No VTE prophylaxis required for most patients, encourage ambulation.   IMPROVE Score 2-3: At risk, pharmacologic VTE prophylaxis is indicated for most patients (in the absence of a contraindication)  IMPROVE Score > or = 4: High Risk, pharmacologic VTE prophylaxis is indicated for most patients (in the absence of a contraindication)

## 2019-12-09 NOTE — ED ADULT TRIAGE NOTE - CHIEF COMPLAINT QUOTE
Patient comes in with abdominal pain, alternating diarrhea and constipation x 2 weeks. patient endorses fever x 2 days. No signs of acute distress noted.

## 2019-12-09 NOTE — H&P ADULT - NSICDXPASTSURGICALHX_GEN_ALL_CORE_FT
PAST SURGICAL HISTORY:  History of tonsillectomy     S/P arthroscopy of right knee 7/2016 and done prior about 10 years ago    S/P cervical discectomy 5/2013    S/P hip replacement, left 2017    S/P hip replacement, right 2012    S/P insertion of spinal cord stimulator 10/2017    S/P lumbar fusion 7/2012 .3/ 2017

## 2019-12-09 NOTE — H&P ADULT - NSHPPHYSICALEXAM_GEN_ALL_CORE
ICU Vital Signs Last 24 Hrs    T(F): 99 (09 Dec 2019 15:20), Max: 99.3 (09 Dec 2019 10:20)  HR: 66 (09 Dec 2019 15:20) (66 - 86)  BP: 120/50 (09 Dec 2019 15:20) (120/50 - 158/97)  BP(mean): 68 (09 Dec 2019 15:20) (68 - 68)    RR: 18 (09 Dec 2019 15:20) (18 - 18)  SpO2: 100% (09 Dec 2019 15:20) (100% - 100%)

## 2019-12-09 NOTE — H&P ADULT - NSICDXPASTMEDICALHX_GEN_ALL_CORE_FT
PAST MEDICAL HISTORY:  Back pain     Fracture Lumbar 1    Hashimoto's disease     Herniated nucleus pulposus, lumbar     Herniated nucleus pulposus, thoracic     HLD (hyperlipidemia) diet controlled    HTN (hypertension)     Osteoporosis

## 2019-12-09 NOTE — H&P ADULT - HISTORY OF PRESENT ILLNESS
60 y/o female with a PMHx of back pain, Hashimoto's, Herniated nucleus pulposus, HTN, HLD presents to the ED c/o abd pain x2 weeks. +intermittent diarrhea, +subjective fever. Denies urinary symptoms, n/v. Last colonoscopy 2 years ago resulting normal. Allergies: Penicillin, IV contrast. No other complaints at this time. Pt is a 60 y/o female with a PMHx of back pain, Hashimoto's, Herniated nucleus pulposus, HTN, HLD presents to the ED c/o abd pain x2 weeks. +intermittent diarrhea, +subjective fever. Denies urinary symptoms, n/v. Last colonoscopy 2 years ago resulting normal. Allergies: Penicillin, IV contrast. No other complaints at this time. Pt is a 60 y/o female with a PMHx of back pain, Hashimoto's, Herniated nucleus pulposus, HTN, HLD presents to the ED c/o constipation and incr abd pain for the last week since taking Imodium for diarrhea which started the weekend after Thanksgiving.   Pt c/o intermittent diarrhea, subjective fever.     Denies urinary symptoms, n/v. Last colonoscopy 2 years ago resulting normal     Allergies: Penicillin, IV contrast. No other complaints at this time.    Fam Hx:  Pt is adopted.  She reports her children are healthy

## 2019-12-09 NOTE — ED STATDOCS - PROGRESS NOTE DETAILS
60 y/o female with a PMHx of back pain, Hashimoto's, Herniated nucleus pulposus, HTN, HLD presents to the ED c/o abd pain x2 weeks. +intermittent diarrhea, +subjective fever. Denies urinary symptoms, n/v. Last colonoscopy 2 years ago resulting normal. Allergies: Penicillin, IV contrast. No other complaints at this time.  Ciarra Hopkins PA-C Plan for CT A/P rule out diverticulitis.  Ciarra Hopkins PA-C Leukocytosis with shift on labs.  Awaiting CT.  Pt. c/o pain with medicate with Morphine.  Ciarra Hopkins PA-C Pt. with Stercoral colitis.  Surgical resident contacted and will contact Dr. Brunner for admission.  Ciprofloxacin and Flagyl ordered and cleared with resident.  Ciarra Hopkins PA-C

## 2019-12-09 NOTE — ED STATDOCS - EYES, MLM
Fax received w/ explantation of denial. clear bilaterally.  Pupils equal, round, and reactive to light.

## 2019-12-10 LAB
ANION GAP SERPL CALC-SCNC: 3 MMOL/L — LOW (ref 5–17)
BASOPHILS # BLD AUTO: 0.05 K/UL — SIGNIFICANT CHANGE UP (ref 0–0.2)
BASOPHILS NFR BLD AUTO: 0.4 % — SIGNIFICANT CHANGE UP (ref 0–2)
BUN SERPL-MCNC: 4 MG/DL — LOW (ref 7–23)
CALCIUM SERPL-MCNC: 8.4 MG/DL — LOW (ref 8.5–10.1)
CHLORIDE SERPL-SCNC: 108 MMOL/L — SIGNIFICANT CHANGE UP (ref 96–108)
CO2 SERPL-SCNC: 29 MMOL/L — SIGNIFICANT CHANGE UP (ref 22–31)
CREAT SERPL-MCNC: 0.54 MG/DL — SIGNIFICANT CHANGE UP (ref 0.5–1.3)
CULTURE RESULTS: SIGNIFICANT CHANGE UP
EOSINOPHIL # BLD AUTO: 0.37 K/UL — SIGNIFICANT CHANGE UP (ref 0–0.5)
EOSINOPHIL NFR BLD AUTO: 3 % — SIGNIFICANT CHANGE UP (ref 0–6)
GLUCOSE SERPL-MCNC: 94 MG/DL — SIGNIFICANT CHANGE UP (ref 70–99)
HCT VFR BLD CALC: 30.1 % — LOW (ref 34.5–45)
HCV AB S/CO SERPL IA: 0.11 S/CO — SIGNIFICANT CHANGE UP (ref 0–0.99)
HCV AB SERPL-IMP: SIGNIFICANT CHANGE UP
HGB BLD-MCNC: 10.2 G/DL — LOW (ref 11.5–15.5)
IMM GRANULOCYTES NFR BLD AUTO: 0.3 % — SIGNIFICANT CHANGE UP (ref 0–1.5)
LYMPHOCYTES # BLD AUTO: 1.96 K/UL — SIGNIFICANT CHANGE UP (ref 1–3.3)
LYMPHOCYTES # BLD AUTO: 15.7 % — SIGNIFICANT CHANGE UP (ref 13–44)
MCHC RBC-ENTMCNC: 31.5 PG — SIGNIFICANT CHANGE UP (ref 27–34)
MCHC RBC-ENTMCNC: 33.9 GM/DL — SIGNIFICANT CHANGE UP (ref 32–36)
MCV RBC AUTO: 92.9 FL — SIGNIFICANT CHANGE UP (ref 80–100)
MONOCYTES # BLD AUTO: 1.1 K/UL — HIGH (ref 0–0.9)
MONOCYTES NFR BLD AUTO: 8.8 % — SIGNIFICANT CHANGE UP (ref 2–14)
NEUTROPHILS # BLD AUTO: 8.97 K/UL — HIGH (ref 1.8–7.4)
NEUTROPHILS NFR BLD AUTO: 71.8 % — SIGNIFICANT CHANGE UP (ref 43–77)
PLATELET # BLD AUTO: 293 K/UL — SIGNIFICANT CHANGE UP (ref 150–400)
POTASSIUM SERPL-MCNC: 3.8 MMOL/L — SIGNIFICANT CHANGE UP (ref 3.5–5.3)
POTASSIUM SERPL-SCNC: 3.8 MMOL/L — SIGNIFICANT CHANGE UP (ref 3.5–5.3)
RBC # BLD: 3.24 M/UL — LOW (ref 3.8–5.2)
RBC # FLD: 12.6 % — SIGNIFICANT CHANGE UP (ref 10.3–14.5)
SODIUM SERPL-SCNC: 140 MMOL/L — SIGNIFICANT CHANGE UP (ref 135–145)
SPECIMEN SOURCE: SIGNIFICANT CHANGE UP
WBC # BLD: 12.49 K/UL — HIGH (ref 3.8–10.5)
WBC # FLD AUTO: 12.49 K/UL — HIGH (ref 3.8–10.5)

## 2019-12-10 RX ORDER — MINERAL OIL
133 OIL (ML) MISCELLANEOUS
Refills: 0 | Status: DISCONTINUED | OUTPATIENT
Start: 2019-12-10 | End: 2019-12-13

## 2019-12-10 RX ORDER — MULTIVIT WITH MIN/MFOLATE/K2 340-15/3 G
0.5 POWDER (GRAM) ORAL ONCE
Refills: 0 | Status: COMPLETED | OUTPATIENT
Start: 2019-12-10 | End: 2019-12-10

## 2019-12-10 RX ORDER — POLYETHYLENE GLYCOL 3350 17 G/17G
17 POWDER, FOR SOLUTION ORAL EVERY 12 HOURS
Refills: 0 | Status: DISCONTINUED | OUTPATIENT
Start: 2019-12-10 | End: 2019-12-13

## 2019-12-10 RX ADMIN — FENTANYL CITRATE 1 PATCH: 50 INJECTION INTRAVENOUS at 07:28

## 2019-12-10 RX ADMIN — MORPHINE SULFATE 4 MILLIGRAM(S): 50 CAPSULE, EXTENDED RELEASE ORAL at 12:11

## 2019-12-10 RX ADMIN — Medication 0.5 BOTTLE: at 20:00

## 2019-12-10 RX ADMIN — TRAMADOL HYDROCHLORIDE 50 MILLIGRAM(S): 50 TABLET ORAL at 17:25

## 2019-12-10 RX ADMIN — Medication 133 MILLILITER(S): at 17:25

## 2019-12-10 RX ADMIN — Medication 200 MILLIGRAM(S): at 17:24

## 2019-12-10 RX ADMIN — Medication 200 MILLIGRAM(S): at 21:20

## 2019-12-10 RX ADMIN — MORPHINE SULFATE 4 MILLIGRAM(S): 50 CAPSULE, EXTENDED RELEASE ORAL at 13:00

## 2019-12-10 RX ADMIN — HEPARIN SODIUM 5000 UNIT(S): 5000 INJECTION INTRAVENOUS; SUBCUTANEOUS at 17:25

## 2019-12-10 RX ADMIN — FENTANYL CITRATE 1 PATCH: 50 INJECTION INTRAVENOUS at 19:57

## 2019-12-10 RX ADMIN — Medication 200 MILLIGRAM(S): at 06:10

## 2019-12-10 RX ADMIN — Medication 100 MICROGRAM(S): at 05:07

## 2019-12-10 RX ADMIN — POLYETHYLENE GLYCOL 3350 17 GRAM(S): 17 POWDER, FOR SOLUTION ORAL at 06:10

## 2019-12-10 RX ADMIN — MORPHINE SULFATE 4 MILLIGRAM(S): 50 CAPSULE, EXTENDED RELEASE ORAL at 22:30

## 2019-12-10 RX ADMIN — LOSARTAN POTASSIUM 25 MILLIGRAM(S): 100 TABLET, FILM COATED ORAL at 06:10

## 2019-12-10 RX ADMIN — MORPHINE SULFATE 4 MILLIGRAM(S): 50 CAPSULE, EXTENDED RELEASE ORAL at 23:27

## 2019-12-10 RX ADMIN — HEPARIN SODIUM 5000 UNIT(S): 5000 INJECTION INTRAVENOUS; SUBCUTANEOUS at 05:13

## 2019-12-10 RX ADMIN — POLYETHYLENE GLYCOL 3350 17 GRAM(S): 17 POWDER, FOR SOLUTION ORAL at 17:24

## 2019-12-10 RX ADMIN — TRAMADOL HYDROCHLORIDE 50 MILLIGRAM(S): 50 TABLET ORAL at 18:35

## 2019-12-10 RX ADMIN — Medication 200 MILLIGRAM(S): at 12:09

## 2019-12-10 RX ADMIN — Medication 200 MILLIGRAM(S): at 05:10

## 2019-12-10 RX ADMIN — TRAMADOL HYDROCHLORIDE 50 MILLIGRAM(S): 50 TABLET ORAL at 05:07

## 2019-12-10 NOTE — CONSULT NOTE ADULT - ASSESSMENT
60 y/o female  with a PMHx of back pain, Hashimoto's, Herniated nucleus pulposus, HTN, HLD presented to the ED c/o constipation and incr abd pain for the last week since taking Imodium for diarrhea which started the weekend after Thanksgi. Admitted with stercoral colitis.     #rectal pain  patient is not sexually active. Denies GYN history, CT a/p showing no pelvic pathology.  PID is not likely given her history  Pain is likely secondary to constipation and stercoral colitis  further management per medicine team   will sign off at this time    #stercoral colitis  continue IVF, abx  diet as tolerated  Plan per GI/surgery

## 2019-12-10 NOTE — CONSULT NOTE ADULT - SUBJECTIVE AND OBJECTIVE BOX
Patient is a 59y old  Female who presents with a chief complaint of stercoral colitis  Chronic constipation  Dehydration  Leukocytosis (10 Dec 2019 08:35)      HPI:  Pt is a 58 y/o female with a PMHx of back pain, Hashimoto's, Herniated nucleus pulposus, HTN, HLD presents to the ED c/o constipation and incr abd pain for the last week since taking Imodium for diarrhea which started the weekend after Thanksgiving.   Pt c/o intermittent diarrhea, subjective fever.     Denies urinary symptoms, n/v. Last colonoscopy 2 years ago resulting normal    although patient states that she's had colonoscopy 2 years ago at [facility], I checked records and do not see evidence of this.  Patient states that she has been having some diarrhea however, on specific questioning, it appears that she was only having mucous discharge from the rectal area with lower abdominal pain and nauseousness for which she took Imodium about a week ago. She has been constipated since then.  Denies any cervical discharge or any vaginal discharge. Denies any history of sexually transmitted diseases.     Allergies: Penicillin, IV contrast. No other complaints at this time.    Fam Hx:  Pt is adopted.  She reports her children are healthy (09 Dec 2019 15:39)      PAST MEDICAL & SURGICAL HISTORY:  Osteoporosis  Fracture: Lumbar 1  HLD (hyperlipidemia): diet controlled  Herniated nucleus pulposus, thoracic  Herniated nucleus pulposus, lumbar  HTN (hypertension)  Hashimoto's disease  Back pain  S/P insertion of spinal cord stimulator: 10/2017  S/P hip replacement, left: 2017  History of tonsillectomy  S/P arthroscopy of right knee: 7/2016 and done prior about 10 years ago  S/P cervical discectomy: 5/2013  S/P lumbar fusion: 7/2012 .3/ 2017  S/P hip replacement, right: 2012      MEDICATIONS  (STANDING):  ciprofloxacin   IVPB 400 milliGRAM(s) IV Intermittent two times a day  fentaNYL   Patch  12 MICROgram(s)/Hr 1 Patch Transdermal every 72 hours  heparin  Injectable 5000 Unit(s) SubCutaneous every 12 hours  levothyroxine 100 MICROGram(s) Oral daily  losartan 25 milliGRAM(s) Oral daily  metroNIDAZOLE  IVPB 500 milliGRAM(s) IV Intermittent every 8 hours  polyethylene glycol 3350 17 Gram(s) Oral two times a day  sodium chloride 0.9%. 1000 milliLiter(s) (150 mL/Hr) IV Continuous <Continuous>  traMADol 50 milliGRAM(s) Oral every 12 hours    MEDICATIONS  (PRN):  morphine  - Injectable 4 milliGRAM(s) IV Push every 4 hours PRN Moderate Pain (4 - 6)      Allergies    adhesives (Rash; Blisters; Hives)  IV Contrast (Hives)  Lyrica (Other)  penicillin (Hives)    Intolerances        SOCIAL HISTORY:neg drugs	    FAMILY HISTORY: non constrib      REVIEW OF SYSTEMS:    CONSTITUTIONAL: No weakness, fevers or chills  EYES/ENT: No visual changes;  No vertigo or throat pain   NECK: No pain or stiffness  RESPIRATORY: No cough, wheezing, hemoptysis; No shortness of breath  CARDIOVASCULAR: No chest pain or palpitations  GENITOURINARY: No dysuria, frequency or hematuria  NEUROLOGICAL: No numbness or weakness  SKIN: No itching, burning, rashes, or lesions   All other review of systems is negative unless indicated above.    Vital Signs Last 24 Hrs  T(C): 37.1 (10 Dec 2019 05:40), Max: 37.4 (09 Dec 2019 10:20)  T(F): 98.8 (10 Dec 2019 05:40), Max: 99.3 (09 Dec 2019 10:20)  HR: 67 (10 Dec 2019 05:40) (66 - 86)  BP: 109/66 (10 Dec 2019 05:40) (102/57 - 158/97)  BP(mean): 68 (09 Dec 2019 15:20) (68 - 68)  RR: 17 (10 Dec 2019 05:40) (16 - 18)  SpO2: 98% (10 Dec 2019 05:40) (98% - 100%)    PHYSICAL EXAM:    Constitutional: NAD, well-developed  HEENT: EOMI, throat clear  Neck: No LAD, supple  Respiratory: CTA and P  Cardiovascular: S1 and S2, RRR, no M  Gastrointestinal: BS+, soft, L abd tend/ND, neg HSM,  Extremities: No peripheral edema, neg clubing, cyanosis  Vascular: 2+ peripheral pulses  Neurological: A/O x 3, no focal deficits  Psychiatric: Normal mood, normal affect  Skin: No rashes  rectal, ant tenderness,   mostly when on pushing on the cervix she has significant abdominal pain and tenderness,    LABS:  CBC Full  -  ( 09 Dec 2019 10:56 )  WBC Count : 18.40 K/uL  RBC Count : 4.54 M/uL  Hemoglobin : 14.1 g/dL  Hematocrit : 41.5 %  Platelet Count - Automated : 402 K/uL  Mean Cell Volume : 91.4 fl  Mean Cell Hemoglobin : 31.1 pg  Mean Cell Hemoglobin Concentration : 34.0 gm/dL  Auto Neutrophil # : 15.40 K/uL  Auto Lymphocyte # : 1.58 K/uL  Auto Monocyte # : 1.17 K/uL  Auto Eosinophil # : 0.14 K/uL  Auto Basophil # : 0.04 K/uL  Auto Neutrophil % : 83.6 %  Auto Lymphocyte % : 8.6 %  Auto Monocyte % : 6.4 %  Auto Eosinophil % : 0.8 %  Auto Basophil % : 0.2 %    12-09    136  |  102  |  9   ----------------------------<  107<H>  3.4<L>   |  26  |  0.76    Ca    9.6      09 Dec 2019 10:56    TPro  8.2  /  Alb  4.0  /  TBili  0.6  /  DBili  x   /  AST  27  /  ALT  24  /  AlkPhos  83  12-09            RADIOLOGY & ADDITIONAL STUDIES:  < from: CT Abdomen and Pelvis w/ Oral Cont (12.09.19 @ 13:39) >  EXAM:  CT ABDOMEN AND PELVIS OC                            PROCEDURE DATE:  12/09/2019          INTERPRETATION:  CLINICAL INFORMATION: Abdominal pain    COMPARISON: None.    PROCEDURE:   CT of the Abdomen and Pelvis was performed without intravenous contrast.   Intravenous contrast: None.  Oral contrast: positive contrast was administered.  Sagittal and coronal reformats were performed.    FINDINGS:    LOWER CHEST: Coronary artery calcifications. Clear lung bases.    LIVER: Within normal limits.  BILE DUCTS: Normal caliber.  GALLBLADDER: Within normal limits.  SPLEEN: Within normal limits.  PANCREAS: Within normal limits.  ADRENALS: Within normal limits.  KIDNEYS/URETERS: Within normal limits.    PELVIS: Evaluation of the lower pelvis is limited due to beam hardening   artifact from the patient's bilateral hip arthroplasties. The bladder,   uterus and adnexa are within normal limits.    BOWEL: Large amount of stool seen throughout the colon with wall   thickening of the descending and sigmoid colon. No extraluminal gas.   Normal appendix.    PERITONEUM: No ascites.  VESSELS: Within normal limits.  RETROPERITONEUM/LYMPH NODES: No lymphadenopathy.    ABDOMINAL WALL: Right buttock spinal stimulator.  BONES: Bilateral hip arthroplasties. Moderate L1 compression deformity,   status post vertebroplasty. Orthopedic hardware in the L3-L5 vertebral   bodies with L3-L4 disc spacer. No acute abnormality.      IMPRESSION:     Stercoral colitis involving the descending and sigmoid colon.  Marked amount of stool throughout the colon.                      NICHOLE MAST   This document has been electronically signed. Dec  9 2019  1:51PM          < end of copied text >

## 2019-12-10 NOTE — CONSULT NOTE ADULT - SUBJECTIVE AND OBJECTIVE BOX
NATALIE LELA  59y  Female [id number]    Patient is a 59y old  Female who presents with a chief complaint of stercoral colitis  Chronic constipation  Dehydration  Leukocytosis (10 Dec 2019 08:48)      HPI:  Pt is a 60 y/o female  with a PMHx of back pain, Hashimoto's, Herniated nucleus pulposus, HTN, HLD presented to the ED c/o constipation and incr abd pain for the last week since taking Imodium for diarrhea which started the weekend after Thanksgi.   Pt c/o intermittent diarrhea, subjective fever. Denies urinary symptoms, n/v. Last colonoscopy 2 years ago resulting normal. Denies GYN problems, denies vaginal discharge/pain.      Allergies: Penicillin, IV contrast. No other complaints at this time.    Fam Hx:  Pt is adopted.  She reports her children are healthy (09 Dec 2019 15:39)      REVIEW OF SYSTEMS:  CONSTITUTIONAL: No weakness, fevers or chills  EYES/ENT: No visual changes;  No vertigo or throat pain   RESPIRATORY: No cough, wheezing, hemoptysis; No shortness of breath  CARDIOVASCULAR: No chest pain or palpitations  GASTROINTESTINAL: diffuse abdominal pain, constipation. Denies n/v  GENITOURINARY: No dysuria, frequency or hematuria  NEUROLOGICAL: No numbness or weakness  SKIN: No itching, burning, rashes, or lesions   All other review of systems is negative unless indicated above    MEDICATIONS  (STANDING):  ciprofloxacin   IVPB 400 milliGRAM(s) IV Intermittent two times a day  fentaNYL   Patch  12 MICROgram(s)/Hr 1 Patch Transdermal every 72 hours  heparin  Injectable 5000 Unit(s) SubCutaneous every 12 hours  levothyroxine 100 MICROGram(s) Oral daily  losartan 25 milliGRAM(s) Oral daily  metroNIDAZOLE  IVPB 500 milliGRAM(s) IV Intermittent every 8 hours  mineral oil enema 133 milliLiter(s) Rectal two times a day  polyethylene glycol 3350 17 Gram(s) Oral every 12 hours  sodium chloride 0.9%. 1000 milliLiter(s) (150 mL/Hr) IV Continuous <Continuous>  traMADol 50 milliGRAM(s) Oral every 12 hours    MEDICATIONS  (PRN):  morphine  - Injectable 4 milliGRAM(s) IV Push every 4 hours PRN Moderate Pain (4 - 6)      Allergies    adhesives (Rash; Blisters; Hives)  IV Contrast (Hives)  Lyrica (Other)  penicillin (Hives)    Intolerances        PAST MEDICAL & SURGICAL HISTORY:  Osteoporosis  Fracture: Lumbar 1  HLD (hyperlipidemia): diet controlled  Herniated nucleus pulposus, thoracic  Herniated nucleus pulposus, lumbar  HTN (hypertension)  Hashimoto's disease  Back pain  S/P insertion of spinal cord stimulator: 10/2017  S/P hip replacement, left:   History of tonsillectomy  S/P arthroscopy of right knee: 2016 and done prior about 10 years ago  S/P cervical discectomy: 2013  S/P lumbar fusion: 2012 .3/ 2017  S/P hip replacement, right:       OB/GYN HISTORY:   LMP 5 years ago                                           Last Pap smear:  2 years ago, WNL                                            FAMILY HISTORY:      SOCIAL HISTORY: denies sexual activity    Vital Signs Last 24 Hrs  T(C): 37.1 (10 Dec 2019 05:40), Max: 37.4 (09 Dec 2019 10:20)  T(F): 98.8 (10 Dec 2019 05:40), Max: 99.3 (09 Dec 2019 10:20)  HR: 67 (10 Dec 2019 05:40) (66 - 86)  BP: 109/66 (10 Dec 2019 05:40) (102/57 - 158/97)  BP(mean): 68 (09 Dec 2019 15:20) (68 - 68)  RR: 17 (10 Dec 2019 05:40) (16 - 18)  SpO2: 98% (10 Dec 2019 05:40) (98% - 100%)    Last Menstrual Period: 5 years ago      PHYSICAL EXAM:  Constitutional: NAD, awake and alert, well-developed  Respiratory: Breath sounds are clear bilaterally, No wheezing, rales or rhonchi  Cardiovascular: S1 and S2, regular rate and rhythm, no Murmurs, gallops or rubs  Gastrointestinal: Bowel Sounds present, diffusely TTP  Extremities: No peripheral edema  Vascular: 2+ peripheral pulses  Neurological: A/O x 3, no focal deficits      LABS:  Pregnancy Test:                        14.1   18.40 )-----------( 402      ( 09 Dec 2019 10:56 )             41.5         136  |  102  |  9   ----------------------------<  107<H>  3.4<L>   |  26  |  0.76    Ca    9.6      09 Dec 2019 10:56    TPro  8.2  /  Alb  4.0  /  TBili  0.6  /  DBili  x   /  AST  27  /  ALT  24  /  AlkPhos  83      I&O's Detail      Urinalysis Basic - ( 09 Dec 2019 12:15 )    Color: Yellow / Appearance: Clear / S.025 / pH: x  Gluc: x / Ketone: Moderate  / Bili: Negative / Urobili: Negative mg/dL   Blood: x / Protein: 30 mg/dL / Nitrite: Negative   Leuk Esterase: Trace / RBC: 3-5 /HPF / WBC 3-5   Sq Epi: x / Non Sq Epi: Few / Bacteria: Moderate        RADIOLOGY & ADDITIONAL STUDIES:  < from: CT Abdomen and Pelvis w/ Oral Cont (19 @ 13:39) >  EXAM:  CT ABDOMEN AND PELVIS OC                            PROCEDURE DATE:  2019          INTERPRETATION:  CLINICAL INFORMATION: Abdominal pain    COMPARISON: None.    PROCEDURE:   CT of the Abdomen and Pelvis was performed without intravenous contrast.   Intravenous contrast: None.  Oral contrast: positive contrast was administered.  Sagittal and coronal reformats were performed.    FINDINGS:    LOWER CHEST: Coronary artery calcifications. Clear lung bases.    LIVER: Within normal limits.  BILE DUCTS: Normal caliber.  GALLBLADDER: Within normal limits.  SPLEEN: Within normal limits.  PANCREAS: Within normal limits.  ADRENALS: Within normal limits.  KIDNEYS/URETERS: Within normal limits.    PELVIS: Evaluation of the lower pelvis is limited due to beam hardening   artifact from the patient's bilateral hip arthroplasties. The bladder,   uterus and adnexa are within normal limits.    BOWEL: Large amount of stool seen throughout the colon with wall   thickening of the descending and sigmoid colon. No extraluminal gas.   Normal appendix.    PERITONEUM: No ascites.  VESSELS: Within normal limits.  RETROPERITONEUM/LYMPH NODES: No lymphadenopathy.    ABDOMINAL WALL: Right buttock spinal stimulator.  BONES: Bilateral hip arthroplasties. Moderate L1 compression deformity,   status post vertebroplasty. Orthopedic hardware in the L3-L5 vertebral   bodies with L3-L4 disc spacer. No acute abnormality.      IMPRESSION:     Stercoral colitis involving the descending and sigmoid colon.  Marked amount of stool throughout the colon.    < end of copied text >

## 2019-12-10 NOTE — PROGRESS NOTE ADULT - ASSESSMENT
Stercoral colitis Vs r/o PID ? endometritis(anterior rectal wall tenderness)  Chronic constipation  Dehydration  Leukocytosis  - cont IVF  - clear liq  - cont Cipro flagyl  - miralx  -GYN consult  - apprec Surg and GI consults  - GI and pain consults  - repeat labs  - DVT prophylaxis : heparin Stercoral colitis   Chronic constipation  Dehydration  Leukocytosis  - cont IVF  - clear liq  - cont Cipro flagyl  - miralx, mineral oil enema   -GYN /GI AND SURG CONSULTS APPRECIATED   - apprec Surg and GI consults  - DVT prophylaxis : heparin

## 2019-12-10 NOTE — PROGRESS NOTE ADULT - ASSESSMENT
60 yo female with stercoral colitis    -Fluid resuscitation and bowel rest  -No surgical intervention at this time  -GI evaluation r/o impaction vs distal mass  -Stool softeners and enemas   -Monitor bowel function  -Pain control with non-narcotics

## 2019-12-10 NOTE — CONSULT NOTE ADULT - SUBJECTIVE AND OBJECTIVE BOX
HPI:  58 y/o female with a PMHx of back pain, Hashimoto's, Herniated nucleus pulposus, HTN, HLD presents to the ED c/o constipation and incr abd pain for the last week since taking Imodium for diarrhea which started the weekend after Thanksgi.   Pt c/o intermittent diarrhea, subjective fever. Denies urinary symptoms, n/v. Last colonoscopy 2 years ago resulting normal. Here afebrile, wbc ct 18, CT abd/pelvis stercoral colitis, was eval by GI given cipro/flagyl.       PMH: as above  PSH: as above  Meds: per reconciliation sheet, noted below  MEDICATIONS  (STANDING):  ciprofloxacin   IVPB 400 milliGRAM(s) IV Intermittent two times a day  fentaNYL   Patch  12 MICROgram(s)/Hr 1 Patch Transdermal every 72 hours  heparin  Injectable 5000 Unit(s) SubCutaneous every 12 hours  levothyroxine 100 MICROGram(s) Oral daily  losartan 25 milliGRAM(s) Oral daily  metroNIDAZOLE  IVPB 500 milliGRAM(s) IV Intermittent every 8 hours  mineral oil enema 133 milliLiter(s) Rectal two times a day  polyethylene glycol 3350 17 Gram(s) Oral every 12 hours  sodium chloride 0.9%. 1000 milliLiter(s) (150 mL/Hr) IV Continuous <Continuous>  traMADol 50 milliGRAM(s) Oral every 12 hours    Allergies    adhesives (Rash; Blisters; Hives)  IV Contrast (Hives)  Lyrica (Other)  penicillin (Hives)    Intolerances      Social: no smoking, no alcohol, no illegal drugs; no recent travel, no exposure to TB  FAMILY HISTORY:     no history of premature cardiovascular disease in first degree relatives    ROS: the patient denies fever, no chills, no HA, no dizziness, no sore throat, no blurry vision, no CP, no palpitations, no SOB, no cough, no abdominal pain, no diarrhea, no N/V, no dysuria, no leg pain, no claudication, no rash, no joint aches, no rectal pain or bleeding, no night sweats  All other systems reviewed and are negative    Vital Signs Last 24 Hrs  T(C): 37.1 (10 Dec 2019 11:10), Max: 37.2 (09 Dec 2019 15:20)  T(F): 98.8 (10 Dec 2019 11:10), Max: 99 (09 Dec 2019 15:20)  HR: 65 (10 Dec 2019 11:10) (65 - 74)  BP: 110/59 (10 Dec 2019 11:10) (102/57 - 120/50)  BP(mean): 68 (09 Dec 2019 15:20) (68 - 68)  RR: 16 (10 Dec 2019 11:10) (16 - 18)  SpO2: 99% (10 Dec 2019 11:10) (98% - 100%)  Daily         PE:  Constitutional: frail looking  HEENT: NC/AT, EOMI, PERRLA, conjunctivae clear; ears and nose atraumatic; pharynx benign  Neck: supple; thyroid not palpable  Back: no tenderness  Respiratory: respiratory effort normal; clear to auscultation  Cardiovascular: S1S2 regular, no murmurs  Abdomen: soft, not tender, distended, positive BS; liver and spleen WNL  Genitourinary: no suprapubic tenderness  Lymphatic: no LN palpable  Musculoskeletal: no muscle tenderness, no joint swelling or tenderness  Extremities: no pedal edema  Neurological/ Psychiatric:  moving all extremities  Skin: no rashes; no palpable lesions    Labs: all available labs reviewed                        10.   12.49 )-----------( 293      ( 10 Dec 2019 09:39 )             30.1     12-10    140  |  108  |  4<L>  ----------------------------<  94  3.8   |  29  |  0.54    Ca    8.4<L>      10 Dec 2019 09:39    TPro  8.2  /  Alb  4.0  /  TBili  0.6  /  DBili  x   /  AST  27  /  ALT  24  /  AlkPhos  83  12-09     LIVER FUNCTIONS - ( 09 Dec 2019 10:56 )  Alb: 4.0 g/dL / Pro: 8.2 gm/dL / ALK PHOS: 83 U/L / ALT: 24 U/L / AST: 27 U/L / GGT: x           Urinalysis Basic - ( 09 Dec 2019 12:15 )    Color: Yellow / Appearance: Clear / S.025 / pH: x  Gluc: x / Ketone: Moderate  / Bili: Negative / Urobili: Negative mg/dL   Blood: x / Protein: 30 mg/dL / Nitrite: Negative   Leuk Esterase: Trace / RBC: 3-5 /HPF / WBC 3-5   Sq Epi: x / Non Sq Epi: Few / Bacteria: Moderate          Radiology: all available radiological tests reviewed  < from: CT Abdomen and Pelvis w/ Oral Cont (19 @ 13:39) >  EXAM:  CT ABDOMEN AND PELVIS OC                            PROCEDURE DATE:  2019          INTERPRETATION:  CLINICAL INFORMATION: Abdominal pain    COMPARISON: None.    PROCEDURE:   CT of the Abdomen and Pelvis was performed without intravenous contrast.   Intravenous contrast: None.  Oral contrast: positive contrast was administered.  Sagittal and coronal reformats were performed.    FINDINGS:    LOWER CHEST: Coronary artery calcifications. Clear lung bases.    LIVER: Within normal limits.  BILE DUCTS: Normal caliber.  GALLBLADDER: Within normal limits.  SPLEEN: Within normal limits.  PANCREAS: Within normal limits.  ADRENALS: Within normal limits.  KIDNEYS/URETERS: Within normal limits.    PELVIS: Evaluation of the lower pelvis is limited due to beam hardening   artifact from the patient's bilateral hip arthroplasties. The bladder,   uterus and adnexa are within normal limits.    BOWEL: Large amount of stool seen throughout the colon with wall   thickening of the descending and sigmoid colon. No extraluminal gas.   Normal appendix.    PERITONEUM: No ascites.  VESSELS: Within normal limits.  RETROPERITONEUM/LYMPH NODES: No lymphadenopathy.    ABDOMINAL WALL: Right buttock spinal stimulator.  BONES: Bilateral hip arthroplasties. Moderate L1 compression deformity,   status post vertebroplasty. Orthopedic hardware in the L3-L5 vertebral   bodies with L3-L4 disc spacer. No acute abnormality.      IMPRESSION:     Stercoral colitis involving the descending and sigmoid colon.  Marked amount of stool throughout the colon.      Advanced directives addressed: full resuscitation

## 2019-12-10 NOTE — PROGRESS NOTE ADULT - SUBJECTIVE AND OBJECTIVE BOX
58 y/o female with a PMHx of back pain, Hashimoto's, Herniated nucleus pulposus, HTN, HLD presents to the ED c/o constipation and incr abd pain for the last week since taking Imodium for diarrhea which started the weekend after Thanksgiving.   Pt c/o intermittent diarrhea, subjective fever.     Denies urinary symptoms, n/v. Last colonoscopy 2 years ago resulting normal      PHYSICAL EXAM:    Daily Height in cm: 165.1 (09 Dec 2019 09:52)    Daily     ICU Vital Signs Last 24 Hrs  T(C): 37.1 (10 Dec 2019 05:40), Max: 37.4 (09 Dec 2019 10:20)  T(F): 98.8 (10 Dec 2019 05:40), Max: 99.3 (09 Dec 2019 10:20)  HR: 67 (10 Dec 2019 05:40) (66 - 86)  BP: 109/66 (10 Dec 2019 05:40) (102/57 - 158/97)  BP(mean): 68 (09 Dec 2019 15:20) (68 - 68)  ABP: --  ABP(mean): --  RR: 17 (10 Dec 2019 05:40) (16 - 18)  SpO2: 98% (10 Dec 2019 05:40) (98% - 100%)      Constitutional: NAD  HEENT: Atraumatic, APRIL, Normal, No congestion  Respiratory: Breath Sounds normal, no rhonchi/wheeze  Cardiovascular: N S1S2;   Gastrointestinal: Abdomen soft,Lower abdominal tenedrenss, no rigidity  Extremities: No edema, peripheral pulses present  Neurological: AAO x 3, no gross focal motor deficits                            14.1   18.40 )-----------( 402      ( 09 Dec 2019 10:56 )             41.5       CBC Full  -  ( 09 Dec 2019 10:56 )  WBC Count : 18.40 K/uL  RBC Count : 4.54 M/uL  Hemoglobin : 14.1 g/dL  Hematocrit : 41.5 %  Platelet Count - Automated : 402 K/uL  Mean Cell Volume : 91.4 fl  Mean Cell Hemoglobin : 31.1 pg  Mean Cell Hemoglobin Concentration : 34.0 gm/dL  Auto Neutrophil # : 15.40 K/uL  Auto Lymphocyte # : 1.58 K/uL  Auto Monocyte # : 1.17 K/uL  Auto Eosinophil # : 0.14 K/uL  Auto Basophil # : 0.04 K/uL  Auto Neutrophil % : 83.6 %  Auto Lymphocyte % : 8.6 %  Auto Monocyte % : 6.4 %  Auto Eosinophil % : 0.8 %  Auto Basophil % : 0.2 %          136  |  102  |  9   ----------------------------<  107<H>  3.4<L>   |  26  |  0.76    Ca    9.6      09 Dec 2019 10:56    TPro  8.2  /  Alb  4.0  /  TBili  0.6  /  DBili  x   /  AST  27  /  ALT  24  /  AlkPhos  83        LIVER FUNCTIONS - ( 09 Dec 2019 10:56 )  Alb: 4.0 g/dL / Pro: 8.2 gm/dL / ALK PHOS: 83 U/L / ALT: 24 U/L / AST: 27 U/L / GGT: x                       Urinalysis Basic - ( 09 Dec 2019 12:15 )    Color: Yellow / Appearance: Clear / S.025 / pH: x  Gluc: x / Ketone: Moderate  / Bili: Negative / Urobili: Negative mg/dL   Blood: x / Protein: 30 mg/dL / Nitrite: Negative   Leuk Esterase: Trace / RBC: 3-5 /HPF / WBC 3-5   Sq Epi: x / Non Sq Epi: Few / Bacteria: Moderate            MEDICATIONS  (STANDING):  ciprofloxacin   IVPB 400 milliGRAM(s) IV Intermittent two times a day  fentaNYL   Patch  12 MICROgram(s)/Hr 1 Patch Transdermal every 72 hours  heparin  Injectable 5000 Unit(s) SubCutaneous every 12 hours  levothyroxine 100 MICROGram(s) Oral daily  losartan 25 milliGRAM(s) Oral daily  metroNIDAZOLE  IVPB 500 milliGRAM(s) IV Intermittent every 8 hours  polyethylene glycol 3350 17 Gram(s) Oral two times a day  sodium chloride 0.9%. 1000 milliLiter(s) (150 mL/Hr) IV Continuous <Continuous>  traMADol 50 milliGRAM(s) Oral every 12 hours 60 y/o female with a PMHx of back pain, Hashimoto's, Herniated nucleus pulposus, HTN, HLD presents to the ED c/o constipation and incr abd pain for the last week since taking Imodium for diarrhea which started the weekend after Thanksgi.   Pt c/o intermittent diarrhea, subjective fever.    12/10- afebrile , still with constipation started on miralax and plan to receive mineral oil enema today, denies abdominal pain or nausea or  vomiting today  Denies urinary symptoms, n/v. Last colonoscopy 2 years ago resulting normal      PHYSICAL EXAM:    Daily Height in cm: 165.1 (09 Dec 2019 09:52)    Daily     ICU Vital Signs Last 24 Hrs  T(C): 37.1 (10 Dec 2019 05:40), Max: 37.4 (09 Dec 2019 10:20)  T(F): 98.8 (10 Dec 2019 05:40), Max: 99.3 (09 Dec 2019 10:20)  HR: 67 (10 Dec 2019 05:40) (66 - 86)  BP: 109/66 (10 Dec 2019 05:40) (102/57 - 158/97)  BP(mean): 68 (09 Dec 2019 15:20) (68 - 68)  ABP: --  ABP(mean): --  RR: 17 (10 Dec 2019 05:40) (16 - 18)  SpO2: 98% (10 Dec 2019 05:40) (98% - 100%)      Constitutional: NAD  HEENT: Atraumatic, APRIL, Normal, No congestion  Respiratory: Breath Sounds normal, no rhonchi/wheeze  Cardiovascular: N S1S2;   Gastrointestinal: Abdomen soft,ntno rigidity  Extremities: No edema, peripheral pulses present  Neurological: AAO x 3, no gross focal motor deficits                            14.1   18.40 )-----------( 402      ( 09 Dec 2019 10:56 )             41.5       CBC Full  -  ( 09 Dec 2019 10:56 )  WBC Count : 18.40 K/uL  RBC Count : 4.54 M/uL  Hemoglobin : 14.1 g/dL  Hematocrit : 41.5 %  Platelet Count - Automated : 402 K/uL  Mean Cell Volume : 91.4 fl  Mean Cell Hemoglobin : 31.1 pg  Mean Cell Hemoglobin Concentration : 34.0 gm/dL  Auto Neutrophil # : 15.40 K/uL  Auto Lymphocyte # : 1.58 K/uL  Auto Monocyte # : 1.17 K/uL  Auto Eosinophil # : 0.14 K/uL  Auto Basophil # : 0.04 K/uL  Auto Neutrophil % : 83.6 %  Auto Lymphocyte % : 8.6 %  Auto Monocyte % : 6.4 %  Auto Eosinophil % : 0.8 %  Auto Basophil % : 0.2 %          136  |  102  |  9   ----------------------------<  107<H>  3.4<L>   |  26  |  0.76    Ca    9.6      09 Dec 2019 10:56    TPro  8.2  /  Alb  4.0  /  TBili  0.6  /  DBili  x   /  AST  27  /  ALT  24  /  AlkPhos  83        LIVER FUNCTIONS - ( 09 Dec 2019 10:56 )  Alb: 4.0 g/dL / Pro: 8.2 gm/dL / ALK PHOS: 83 U/L / ALT: 24 U/L / AST: 27 U/L / GGT: x                       Urinalysis Basic - ( 09 Dec 2019 12:15 )    Color: Yellow / Appearance: Clear / S.025 / pH: x  Gluc: x / Ketone: Moderate  / Bili: Negative / Urobili: Negative mg/dL   Blood: x / Protein: 30 mg/dL / Nitrite: Negative   Leuk Esterase: Trace / RBC: 3-5 /HPF / WBC 3-5   Sq Epi: x / Non Sq Epi: Few / Bacteria: Moderate            MEDICATIONS  (STANDING):  ciprofloxacin   IVPB 400 milliGRAM(s) IV Intermittent two times a day  fentaNYL   Patch  12 MICROgram(s)/Hr 1 Patch Transdermal every 72 hours  heparin  Injectable 5000 Unit(s) SubCutaneous every 12 hours  levothyroxine 100 MICROGram(s) Oral daily  losartan 25 milliGRAM(s) Oral daily  metroNIDAZOLE  IVPB 500 milliGRAM(s) IV Intermittent every 8 hours  polyethylene glycol 3350 17 Gram(s) Oral two times a day  sodium chloride 0.9%. 1000 milliLiter(s) (150 mL/Hr) IV Continuous <Continuous>  traMADol 50 milliGRAM(s) Oral every 12 hours

## 2019-12-10 NOTE — PROGRESS NOTE ADULT - SUBJECTIVE AND OBJECTIVE BOX
NATALIE VOGEL  [id number]  Female    Daily   Pt feeling slightly better  Mucus w/o BM  Passing flatus    Physical Exam:    Pt is AAOx3  General: Well developed, in no acute distress.   Chest: Lungs clear, no rales, no rhonchi, no wheezes.   Heart: RR, no murmurs, no rubs, no gallops.   Abdomen: Soft, mild LLQ tenderness, no masses, BS normal.    Back: Normal curvature, no tenderness.   Neuro: Physiological, no localizing findings.   Skin: Normal, no rashes, no lesions noted.   Extremities: Warm, well perfused, no edema, Pulses intact    I&O's Summary                            14.1   18.40 )-----------( 402      ( 09 Dec 2019 10:56 )             41.5       12-09    136  |  102  |  9   ----------------------------<  107<H>  3.4<L>   |  26  |  0.76    Ca    9.6      09 Dec 2019 10:56    TPro  8.2  /  Alb  4.0  /  TBili  0.6  /  DBili  x   /  AST  27  /  ALT  24  /  AlkPhos  83  12-09        HEALTH ISSUES - PROBLEM Dx:

## 2019-12-10 NOTE — CHART NOTE - NSCHARTNOTEFT_GEN_A_CORE
CT reviewed with Dr Wilson Sim    patient appears to be status post hysterectomy.  Sigmoid colon has descended into the pelvis. It appears to be mildly inflamed.  With lots of stool.    Would continue antibiotics.  Would treat aggressively from above with MiraLAX and also from below with mineral oil enemas.  Line  Discussed with hospitalist

## 2019-12-10 NOTE — CONSULT NOTE ADULT - ASSESSMENT
constipationagree with gentle treatment.    Leukocytosis as well as anterior rectal tenderness, atypical for constipation alone.  Endocrine with antibiotics however, I am concern for possible gynecological etiology of the anterior rectal tenderness.  Consider GYN consultation.  Will review CAT scan with radiology once they come in. Message was left for them.    Gynecological consultation  .  DVT prophylaxis constipationagree with gentle treatment.    Leukocytosis as well as anterior rectal tenderness, atypical for constipation alone.  Endocrine with antibiotics however, I am concern for possible gynecological etiology of the anterior rectal tenderness.  Consider GYN consultation.  Will review CAT scan with radiology once they come in. Message was left for them.      .  DVT prophylaxis

## 2019-12-11 LAB
ALBUMIN SERPL ELPH-MCNC: 2.9 G/DL — LOW (ref 3.3–5)
ALP SERPL-CCNC: 57 U/L — SIGNIFICANT CHANGE UP (ref 40–120)
ALT FLD-CCNC: 19 U/L — SIGNIFICANT CHANGE UP (ref 12–78)
ANION GAP SERPL CALC-SCNC: 5 MMOL/L — SIGNIFICANT CHANGE UP (ref 5–17)
AST SERPL-CCNC: 22 U/L — SIGNIFICANT CHANGE UP (ref 15–37)
BASOPHILS # BLD AUTO: 0.04 K/UL — SIGNIFICANT CHANGE UP (ref 0–0.2)
BASOPHILS NFR BLD AUTO: 0.5 % — SIGNIFICANT CHANGE UP (ref 0–2)
BILIRUB SERPL-MCNC: 0.3 MG/DL — SIGNIFICANT CHANGE UP (ref 0.2–1.2)
BUN SERPL-MCNC: 3 MG/DL — LOW (ref 7–23)
CALCIUM SERPL-MCNC: 8.1 MG/DL — LOW (ref 8.5–10.1)
CHLORIDE SERPL-SCNC: 109 MMOL/L — HIGH (ref 96–108)
CO2 SERPL-SCNC: 28 MMOL/L — SIGNIFICANT CHANGE UP (ref 22–31)
CREAT SERPL-MCNC: 0.56 MG/DL — SIGNIFICANT CHANGE UP (ref 0.5–1.3)
EOSINOPHIL # BLD AUTO: 0.37 K/UL — SIGNIFICANT CHANGE UP (ref 0–0.5)
EOSINOPHIL NFR BLD AUTO: 4.9 % — SIGNIFICANT CHANGE UP (ref 0–6)
GLUCOSE SERPL-MCNC: 112 MG/DL — HIGH (ref 70–99)
HCT VFR BLD CALC: 30.2 % — LOW (ref 34.5–45)
HGB BLD-MCNC: 9.9 G/DL — LOW (ref 11.5–15.5)
IMM GRANULOCYTES NFR BLD AUTO: 0.4 % — SIGNIFICANT CHANGE UP (ref 0–1.5)
LYMPHOCYTES # BLD AUTO: 2.5 K/UL — SIGNIFICANT CHANGE UP (ref 1–3.3)
LYMPHOCYTES # BLD AUTO: 33.1 % — SIGNIFICANT CHANGE UP (ref 13–44)
MCHC RBC-ENTMCNC: 31.3 PG — SIGNIFICANT CHANGE UP (ref 27–34)
MCHC RBC-ENTMCNC: 32.8 GM/DL — SIGNIFICANT CHANGE UP (ref 32–36)
MCV RBC AUTO: 95.6 FL — SIGNIFICANT CHANGE UP (ref 80–100)
MONOCYTES # BLD AUTO: 0.69 K/UL — SIGNIFICANT CHANGE UP (ref 0–0.9)
MONOCYTES NFR BLD AUTO: 9.1 % — SIGNIFICANT CHANGE UP (ref 2–14)
NEUTROPHILS # BLD AUTO: 3.92 K/UL — SIGNIFICANT CHANGE UP (ref 1.8–7.4)
NEUTROPHILS NFR BLD AUTO: 52 % — SIGNIFICANT CHANGE UP (ref 43–77)
PLATELET # BLD AUTO: 288 K/UL — SIGNIFICANT CHANGE UP (ref 150–400)
POTASSIUM SERPL-MCNC: 3.5 MMOL/L — SIGNIFICANT CHANGE UP (ref 3.5–5.3)
POTASSIUM SERPL-SCNC: 3.5 MMOL/L — SIGNIFICANT CHANGE UP (ref 3.5–5.3)
PROT SERPL-MCNC: 6.1 GM/DL — SIGNIFICANT CHANGE UP (ref 6–8.3)
RBC # BLD: 3.16 M/UL — LOW (ref 3.8–5.2)
RBC # FLD: 12.4 % — SIGNIFICANT CHANGE UP (ref 10.3–14.5)
SODIUM SERPL-SCNC: 142 MMOL/L — SIGNIFICANT CHANGE UP (ref 135–145)
WBC # BLD: 7.55 K/UL — SIGNIFICANT CHANGE UP (ref 3.8–10.5)
WBC # FLD AUTO: 7.55 K/UL — SIGNIFICANT CHANGE UP (ref 3.8–10.5)

## 2019-12-11 RX ORDER — MULTIVIT WITH MIN/MFOLATE/K2 340-15/3 G
1 POWDER (GRAM) ORAL ONCE
Refills: 0 | Status: COMPLETED | OUTPATIENT
Start: 2019-12-11 | End: 2019-12-11

## 2019-12-11 RX ORDER — INFLUENZA VIRUS VACCINE 15; 15; 15; 15 UG/.5ML; UG/.5ML; UG/.5ML; UG/.5ML
0.5 SUSPENSION INTRAMUSCULAR ONCE
Refills: 0 | Status: COMPLETED | OUTPATIENT
Start: 2019-12-11 | End: 2019-12-13

## 2019-12-11 RX ORDER — ONDANSETRON 8 MG/1
8 TABLET, FILM COATED ORAL
Refills: 0 | Status: DISCONTINUED | OUTPATIENT
Start: 2019-12-11 | End: 2019-12-13

## 2019-12-11 RX ORDER — SOD SULF/SODIUM/NAHCO3/KCL/PEG
4000 SOLUTION, RECONSTITUTED, ORAL ORAL ONCE
Refills: 0 | Status: COMPLETED | OUTPATIENT
Start: 2019-12-11 | End: 2019-12-11

## 2019-12-11 RX ADMIN — Medication 200 MILLIGRAM(S): at 05:37

## 2019-12-11 RX ADMIN — ONDANSETRON 8 MILLIGRAM(S): 8 TABLET, FILM COATED ORAL at 21:33

## 2019-12-11 RX ADMIN — Medication 100 MICROGRAM(S): at 04:11

## 2019-12-11 RX ADMIN — Medication 200 MILLIGRAM(S): at 06:27

## 2019-12-11 RX ADMIN — HEPARIN SODIUM 5000 UNIT(S): 5000 INJECTION INTRAVENOUS; SUBCUTANEOUS at 05:47

## 2019-12-11 RX ADMIN — SODIUM CHLORIDE 150 MILLILITER(S): 9 INJECTION INTRAMUSCULAR; INTRAVENOUS; SUBCUTANEOUS at 00:16

## 2019-12-11 RX ADMIN — SODIUM CHLORIDE 150 MILLILITER(S): 9 INJECTION INTRAMUSCULAR; INTRAVENOUS; SUBCUTANEOUS at 16:42

## 2019-12-11 RX ADMIN — Medication 200 MILLIGRAM(S): at 17:16

## 2019-12-11 RX ADMIN — TRAMADOL HYDROCHLORIDE 50 MILLIGRAM(S): 50 TABLET ORAL at 06:27

## 2019-12-11 RX ADMIN — LOSARTAN POTASSIUM 25 MILLIGRAM(S): 100 TABLET, FILM COATED ORAL at 05:47

## 2019-12-11 RX ADMIN — Medication 200 MILLIGRAM(S): at 15:18

## 2019-12-11 RX ADMIN — Medication 200 MILLIGRAM(S): at 22:31

## 2019-12-11 RX ADMIN — TRAMADOL HYDROCHLORIDE 50 MILLIGRAM(S): 50 TABLET ORAL at 17:42

## 2019-12-11 RX ADMIN — HEPARIN SODIUM 5000 UNIT(S): 5000 INJECTION INTRAVENOUS; SUBCUTANEOUS at 17:16

## 2019-12-11 RX ADMIN — Medication 133 MILLILITER(S): at 06:07

## 2019-12-11 RX ADMIN — FENTANYL CITRATE 1 PATCH: 50 INJECTION INTRAVENOUS at 19:50

## 2019-12-11 RX ADMIN — ONDANSETRON 8 MILLIGRAM(S): 8 TABLET, FILM COATED ORAL at 16:42

## 2019-12-11 RX ADMIN — Medication 1 BOTTLE: at 20:37

## 2019-12-11 RX ADMIN — TRAMADOL HYDROCHLORIDE 50 MILLIGRAM(S): 50 TABLET ORAL at 05:47

## 2019-12-11 RX ADMIN — FENTANYL CITRATE 1 PATCH: 50 INJECTION INTRAVENOUS at 06:28

## 2019-12-11 RX ADMIN — POLYETHYLENE GLYCOL 3350 17 GRAM(S): 17 POWDER, FOR SOLUTION ORAL at 17:39

## 2019-12-11 RX ADMIN — TRAMADOL HYDROCHLORIDE 50 MILLIGRAM(S): 50 TABLET ORAL at 17:17

## 2019-12-11 RX ADMIN — ONDANSETRON 8 MILLIGRAM(S): 8 TABLET, FILM COATED ORAL at 10:02

## 2019-12-11 RX ADMIN — POLYETHYLENE GLYCOL 3350 17 GRAM(S): 17 POWDER, FOR SOLUTION ORAL at 05:48

## 2019-12-11 RX ADMIN — Medication 4000 MILLILITER(S): at 09:48

## 2019-12-11 NOTE — PROGRESS NOTE ADULT - ASSESSMENT
60 yo female with stercoral colitis    -Fluid resuscitation and bowel rest  -No surgical intervention at this time  -f/u GI recs  -GYN recs appreciated, no acute GYN pathology  -Stool softeners and enemas   -Monitor bowel function  -Pain control with non-narcotics

## 2019-12-11 NOTE — PROGRESS NOTE ADULT - ASSESSMENT
constipation associated with colitis, etiology not clear.  Small bowel movement with magnesium citrate and also passed some soft mucousy stools.  We'll try GoLYTELY from above. GoLYTELY clears her, we'll plan on colonoscopy.  GoLYTELY does not clear her, would consider colonoscopy from below to assess etiology.  Alternatives benefits and risks discussed with patient. Antibiotics, would continue for now.    .  Analgesics as needed minimize narcotics please

## 2019-12-11 NOTE — PROGRESS NOTE ADULT - SUBJECTIVE AND OBJECTIVE BOX
58 y/o female with a PMHx of back pain, Hashimoto's, Herniated nucleus pulposus, HTN, HLD presents to the ED c/o constipation and incr abd pain for the last week since taking Imodium for diarrhea which started the weekend after Thanksgiving.   Pt c/o intermittent diarrhea, subjective fever.    12/11- afebrile , still with constipation, now on golytelyand ,, denies  nausea or  vomiting today  Denies urinary symptoms, n/v. Last colonoscopy 2 years ago resulting normal, has some abdominal discomfort but improved     Constitutional: NAD  HEENT: Atraumatic, APRIL, Normal, No congestion  Respiratory: Breath Sounds normal, no rhonchi/wheeze  Cardiovascular: N S1S2;   Gastrointestinal: Abdomen soft,nt, no rigidity  Extremities: No edema, peripheral pulses present  Neurological: AAO x 3, no gross focal motor deficits      PHYSICAL EXAM:    Daily     Daily     ICU Vital Signs Last 24 Hrs  T(C): 36.4 (11 Dec 2019 11:34), Max: 37.1 (10 Dec 2019 22:25)  T(F): 97.6 (11 Dec 2019 11:34), Max: 98.7 (10 Dec 2019 22:25)  HR: 57 (11 Dec 2019 11:34) (57 - 63)  BP: 118/82 (11 Dec 2019 11:34) (105/58 - 118/82)  BP(mean): --  ABP: --  ABP(mean): --  RR: 18 (11 Dec 2019 11:34) (17 - 18)  SpO2: 98% (11 Dec 2019 11:34) (97% - 98%)                              9.9    7.55  )-----------( 288      ( 11 Dec 2019 06:36 )             30.2       CBC Full  -  ( 11 Dec 2019 06:36 )  WBC Count : 7.55 K/uL  RBC Count : 3.16 M/uL  Hemoglobin : 9.9 g/dL  Hematocrit : 30.2 %  Platelet Count - Automated : 288 K/uL  Mean Cell Volume : 95.6 fl  Mean Cell Hemoglobin : 31.3 pg  Mean Cell Hemoglobin Concentration : 32.8 gm/dL  Auto Neutrophil # : 3.92 K/uL  Auto Lymphocyte # : 2.50 K/uL  Auto Monocyte # : 0.69 K/uL  Auto Eosinophil # : 0.37 K/uL  Auto Basophil # : 0.04 K/uL  Auto Neutrophil % : 52.0 %  Auto Lymphocyte % : 33.1 %  Auto Monocyte % : 9.1 %  Auto Eosinophil % : 4.9 %  Auto Basophil % : 0.5 %      12-11    142  |  109<H>  |  3<L>  ----------------------------<  112<H>  3.5   |  28  |  0.56    Ca    8.1<L>      11 Dec 2019 06:36    TPro  6.1  /  Alb  2.9<L>  /  TBili  0.3  /  DBili  x   /  AST  22  /  ALT  19  /  AlkPhos  57  12-11      LIVER FUNCTIONS - ( 11 Dec 2019 06:36 )  Alb: 2.9 g/dL / Pro: 6.1 gm/dL / ALK PHOS: 57 U/L / ALT: 19 U/L / AST: 22 U/L / GGT: x                               MEDICATIONS  (STANDING):  ciprofloxacin   IVPB 400 milliGRAM(s) IV Intermittent two times a day  fentaNYL   Patch  12 MICROgram(s)/Hr 1 Patch Transdermal every 72 hours  heparin  Injectable 5000 Unit(s) SubCutaneous every 12 hours  influenza   Vaccine 0.5 milliLiter(s) IntraMuscular once  levothyroxine 100 MICROGram(s) Oral daily  losartan 25 milliGRAM(s) Oral daily  metroNIDAZOLE  IVPB 500 milliGRAM(s) IV Intermittent every 8 hours  mineral oil enema 133 milliLiter(s) Rectal two times a day  polyethylene glycol 3350 17 Gram(s) Oral every 12 hours  traMADol 50 milliGRAM(s) Oral every 12 hours

## 2019-12-11 NOTE — PROGRESS NOTE ADULT - SUBJECTIVE AND OBJECTIVE BOX
Date of service: 12-11-19 @ 09:48    pt seen and examined  c/o abd distension, still constipated  afebrile    ROS: no fever or chills; denies dizziness, no HA, no SOB or cough, no diarrhea or constipation; no dysuria, no urinary frequency, no legs pain, no rashes    MEDICATIONS  (STANDING):  ciprofloxacin   IVPB 400 milliGRAM(s) IV Intermittent two times a day  fentaNYL   Patch  12 MICROgram(s)/Hr 1 Patch Transdermal every 72 hours  heparin  Injectable 5000 Unit(s) SubCutaneous every 12 hours  influenza   Vaccine 0.5 milliLiter(s) IntraMuscular once  levothyroxine 100 MICROGram(s) Oral daily  losartan 25 milliGRAM(s) Oral daily  metroNIDAZOLE  IVPB 500 milliGRAM(s) IV Intermittent every 8 hours  mineral oil enema 133 milliLiter(s) Rectal two times a day  polyethylene glycol 3350 17 Gram(s) Oral every 12 hours  polyethylene glycol/electrolyte Solution. 4000 milliLiter(s) Oral once  sodium chloride 0.9%. 1000 milliLiter(s) (150 mL/Hr) IV Continuous <Continuous>  traMADol 50 milliGRAM(s) Oral every 12 hours        Vital Signs Last 24 Hrs  T(C): 36.8 (11 Dec 2019 05:43), Max: 37.1 (10 Dec 2019 11:10)  T(F): 98.2 (11 Dec 2019 05:43), Max: 98.8 (10 Dec 2019 11:10)  HR: 60 (11 Dec 2019 05:43) (60 - 65)  BP: 105/58 (11 Dec 2019 05:43) (105/58 - 110/59)  BP(mean): --  RR: 18 (11 Dec 2019 05:43) (16 - 18)  SpO2: 98% (11 Dec 2019 05:43) (97% - 99%)      PE:  Constitutional: frail looking  HEENT: NC/AT, EOMI, PERRLA, conjunctivae clear; ears and nose atraumatic; pharynx benign  Neck: supple; thyroid not palpable  Back: no tenderness  Respiratory: respiratory effort normal; clear to auscultation  Cardiovascular: S1S2 regular, no murmurs  Abdomen: soft, not tender, distended, positive BS; liver and spleen WNL  Genitourinary: no suprapubic tenderness  Lymphatic: no LN palpable  Musculoskeletal: no muscle tenderness, no joint swelling or tenderness  Extremities: no pedal edema  Neurological/ Psychiatric:  moving all extremities  Skin: no rashes; no palpable lesions    Labs: all available labs reviewed                        9.9    7.55  )-----------( 288      ( 11 Dec 2019 06:36 )             30.2     12-11    142  |  109<H>  |  3<L>  ----------------------------<  112<H>  3.5   |  28  |  0.56    Ca    8.1<L>      11 Dec 2019 06:36    TPro  6.1  /  Alb  2.9<L>  /  TBili  0.3  /  DBili  x   /  AST  22  /  ALT  19  /  AlkPhos  57  12-11           Cultures:     Culture - Urine (12.09.19 @ 15:42)    Specimen Source: .Urine None    Culture Results:   <10,000 CFU/mL Normal Urogenital Tiffani    Culture - Blood (12.09.19 @ 10:56)    Specimen Source: .Blood None    Culture Results:   No growth to date.        Radiology: all available radiological tests reviewed    EXAM:  CT ABDOMEN AND PELVIS OC                            PROCEDURE DATE:  12/09/2019          INTERPRETATION:  CLINICAL INFORMATION: Abdominal pain    COMPARISON: None.    PROCEDURE:   CT of the Abdomen and Pelvis was performed without intravenous contrast.   Intravenous contrast: None.  Oral contrast: positive contrast was administered.  Sagittal and coronal reformats were performed.    FINDINGS:    LOWER CHEST: Coronary artery calcifications. Clear lung bases.    LIVER: Within normal limits.  BILE DUCTS: Normal caliber.  GALLBLADDER: Within normal limits.  SPLEEN: Within normal limits.  PANCREAS: Within normal limits.  ADRENALS: Within normal limits.  KIDNEYS/URETERS: Within normal limits.    PELVIS: Evaluation of the lower pelvis is limited due to beam hardening   artifact from the patient's bilateral hip arthroplasties. The bladder,   uterus and adnexa are within normal limits.    BOWEL: Large amount of stool seen throughout the colon with wall   thickening of the descending and sigmoid colon. No extraluminal gas.   Normal appendix.    PERITONEUM: No ascites.  VESSELS: Within normal limits.  RETROPERITONEUM/LYMPH NODES: No lymphadenopathy.    ABDOMINAL WALL: Right buttock spinal stimulator.  BONES: Bilateral hip arthroplasties. Moderate L1 compression deformity,   status post vertebroplasty. Orthopedic hardware in the L3-L5 vertebral   bodies with L3-L4 disc spacer. No acute abnormality.      IMPRESSION:     Stercoral colitis involving the descending and sigmoid colon.  Marked amount of stool throughout the colon.      Advanced directives addressed: full resuscitation

## 2019-12-11 NOTE — PROGRESS NOTE ADULT - SUBJECTIVE AND OBJECTIVE BOX
Patient is a 59y old  Female who presents with a chief complaint of stercoral colitis  Chronic constipation  Dehydration  Leukocytosis (11 Dec 2019 08:04)      HPI:  Pt is a 58 y/o female with a PMHx of back pain, Hashimoto's, Herniated nucleus pulposus, HTN, HLD presents to the ED c/o constipation and incr abd pain for the last week since taking Imodium for diarrhea which started the weekend after Thanksgiving.   Pt c/o intermittent diarrhea, subjective fever.     Denies urinary symptoms, n/v. Last colonoscopy 2 years ago resulting normal    December 11  Patient had a bottle of magnesium citrate and enemas. Had the small bowel movement. Negative vomiting has mild nausea but no abdominal distention.  States that her left-sided abdominal pain is getting a bit better. It is crampy,     Allergies: Penicillin, IV contrast. No other complaints at this time.    Fam Hx:  Pt is adopted.  She reports her children are healthy (09 Dec 2019 15:39)      PAST MEDICAL & SURGICAL HISTORY:  Osteoporosis  Fracture: Lumbar 1  HLD (hyperlipidemia): diet controlled  Herniated nucleus pulposus, thoracic  Herniated nucleus pulposus, lumbar  HTN (hypertension)  Hashimoto's disease  Back pain  S/P insertion of spinal cord stimulator: 10/2017  S/P hip replacement, left: 2017  History of tonsillectomy  S/P arthroscopy of right knee: 7/2016 and done prior about 10 years ago  S/P cervical discectomy: 5/2013  S/P lumbar fusion: 7/2012 .3/ 2017  S/P hip replacement, right: 2012      MEDICATIONS  (STANDING):  ciprofloxacin   IVPB 400 milliGRAM(s) IV Intermittent two times a day  fentaNYL   Patch  12 MICROgram(s)/Hr 1 Patch Transdermal every 72 hours  heparin  Injectable 5000 Unit(s) SubCutaneous every 12 hours  influenza   Vaccine 0.5 milliLiter(s) IntraMuscular once  levothyroxine 100 MICROGram(s) Oral daily  losartan 25 milliGRAM(s) Oral daily  metroNIDAZOLE  IVPB 500 milliGRAM(s) IV Intermittent every 8 hours  mineral oil enema 133 milliLiter(s) Rectal two times a day  polyethylene glycol 3350 17 Gram(s) Oral every 12 hours  polyethylene glycol/electrolyte Solution. 4000 milliLiter(s) Oral once  sodium chloride 0.9%. 1000 milliLiter(s) (150 mL/Hr) IV Continuous <Continuous>  traMADol 50 milliGRAM(s) Oral every 12 hours    MEDICATIONS  (PRN):  morphine  - Injectable 4 milliGRAM(s) IV Push every 4 hours PRN Moderate Pain (4 - 6)  ondansetron Injectable 8 milliGRAM(s) IV Push four times a day PRN nausea and 30 minutes prior to golytly      Allergies    adhesives (Rash; Blisters; Hives)  IV Contrast (Hives)  Lyrica (Other)  penicillin (Hives)    Intolerances        SOCIAL HISTORY:NC    FAMILY HISTORY:NC      REVIEW OF SYSTEMS:    CONSTITUTIONAL: No weakness, fevers or chills  EYES/ENT: No visual changes;  No vertigo or throat pain   NECK: No pain or stiffness  RESPIRATORY: No cough, wheezing, hemoptysis; No shortness of breath  CARDIOVASCULAR: No chest pain or palpitations  GENITOURINARY: No dysuria, frequency or hematuria  NEUROLOGICAL: No numbness or weakness  SKIN: No itching, burning, rashes, or lesions   All other review of systems is negative unless indicated above.    Vital Signs Last 24 Hrs  T(C): 36.8 (11 Dec 2019 05:43), Max: 37.1 (10 Dec 2019 11:10)  T(F): 98.2 (11 Dec 2019 05:43), Max: 98.8 (10 Dec 2019 11:10)  HR: 60 (11 Dec 2019 05:43) (60 - 65)  BP: 105/58 (11 Dec 2019 05:43) (105/58 - 110/59)  BP(mean): --  RR: 18 (11 Dec 2019 05:43) (16 - 18)  SpO2: 98% (11 Dec 2019 05:43) (97% - 99%)    PHYSICAL EXAM:    Constitutional: NAD, well-developed  HEENT: EOMI, throat clear  Neck: No LAD, supple  Respiratory: CTA and P  Cardiovascular: S1 and S2, RRR, no M  Gastrointestinal: BS+, soft, LLQ tender, mild/ND, neg HSM,  Extremities: No peripheral edema, neg clubing, cyanosis  Vascular: 2+ peripheral pulses  Neurological: A/O x 3, no focal deficits  Psychiatric: Normal mood, normal affect  Skin: No rashes    LABS:  CBC Full  -  ( 11 Dec 2019 06:36 )  WBC Count : 7.55 K/uL  RBC Count : 3.16 M/uL  Hemoglobin : 9.9 g/dL  Hematocrit : 30.2 %  Platelet Count - Automated : 288 K/uL  Mean Cell Volume : 95.6 fl  Mean Cell Hemoglobin : 31.3 pg  Mean Cell Hemoglobin Concentration : 32.8 gm/dL  Auto Neutrophil # : 3.92 K/uL  Auto Lymphocyte # : 2.50 K/uL  Auto Monocyte # : 0.69 K/uL  Auto Eosinophil # : 0.37 K/uL  Auto Basophil # : 0.04 K/uL  Auto Neutrophil % : 52.0 %  Auto Lymphocyte % : 33.1 %  Auto Monocyte % : 9.1 %  Auto Eosinophil % : 4.9 %  Auto Basophil % : 0.5 %    12-11    142  |  109<H>  |  3<L>  ----------------------------<  112<H>  3.5   |  28  |  0.56    Ca    8.1<L>      11 Dec 2019 06:36    TPro  6.1  /  Alb  2.9<L>  /  TBili  0.3  /  DBili  x   /  AST  22  /  ALT  19  /  AlkPhos  57  12-11            RADIOLOGY & ADDITIONAL STUDIES:  < from: CT Abdomen and Pelvis w/ Oral Cont (12.09.19 @ 13:39) >  EXAM:  CT ABDOMEN AND PELVIS OC                            PROCEDURE DATE:  12/09/2019          INTERPRETATION:  CLINICAL INFORMATION: Abdominal pain    COMPARISON: None.    PROCEDURE:   CT of the Abdomen and Pelvis was performed without intravenous contrast.   Intravenous contrast: None.  Oral contrast: positive contrast was administered.  Sagittal and coronal reformats were performed.    FINDINGS:    LOWER CHEST: Coronary artery calcifications. Clear lung bases.    LIVER: Within normal limits.  BILE DUCTS: Normal caliber.  GALLBLADDER: Within normal limits.  SPLEEN: Within normal limits.  PANCREAS: Within normal limits.  ADRENALS: Within normal limits.  KIDNEYS/URETERS: Within normal limits.    PELVIS: Evaluation of the lower pelvis is limited due to beam hardening   artifact from the patient's bilateral hip arthroplasties. The bladder,   uterus and adnexa are within normal limits.    BOWEL: Large amount of stool seen throughout the colon with wall   thickening of the descending and sigmoid colon. No extraluminal gas.   Normal appendix.    PERITONEUM: No ascites.  VESSELS: Within normal limits.  RETROPERITONEUM/LYMPH NODES: No lymphadenopathy.    ABDOMINAL WALL: Right buttock spinal stimulator.  BONES: Bilateral hip arthroplasties. Moderate L1 compression deformity,   status post vertebroplasty. Orthopedic hardware in the L3-L5 vertebral   bodies with L3-L4 disc spacer. No acute abnormality.      IMPRESSION:     Stercoral colitis involving the descending and sigmoid colon.  Marked amount of stool throughout the colon.                      NICHOLE MAST   This document has been electronically signed. Dec  9 2019  1:51PM          < end of copied text >  CAT scan reviewed with radiology, inflammatory changes concerning for possible stercoral colitis. Stool noted in the proximal colon. Patient has had a hysterectomy. The palpable tender finding that I noted anterior to the rectum is likely a loop of sigmoid colon that has descended into the pelvis

## 2019-12-11 NOTE — PROGRESS NOTE ADULT - ASSESSMENT
58 y/o female with a PMHx of back pain, Hashimoto's, Herniated nucleus pulposus, HTN, HLD presents to the ED c/o constipation and incr abd pain for the last week since taking Imodium for diarrhea which started the weekend after Thanksgiving.   Pt c/o intermittent diarrhea, subjective fever. Denies urinary symptoms, n/v. Last colonoscopy 2 years ago resulting normal. Here afebrile, wbc ct 18, CT abd/pelvis stercoral colitis, was eval by GI given cipro/flagyl.     1. abdominal pain. stercoral colitis. leukocytosis  - bowel regimen, gi/gyn eval noted, colonoscopy per gi  - wbc ct likely reactive, now normalized   - on cipro/flagyl #2  - urine cx/blood cx no growth  - monitor temps  - tolerating abx well so far; no side effects noted  - reason for abx use and side effects reviewed with patient  - supportive care  - fu cbc    2. other issues - care per medicine

## 2019-12-11 NOTE — PROGRESS NOTE ADULT - ASSESSMENT
Stercoral colitis   Chronic constipation  Dehydration  Leukocytosis  - reduce IVF  - clear liq  - cont Cipro flagyl  Golytely, possible plan for colonoscopy  - miralx, mineral oil enema   -GYN /GI AND SURG, ID CONSULTS APPRECIATED   - DVT prophylaxis : heparin

## 2019-12-11 NOTE — PROGRESS NOTE ADULT - SUBJECTIVE AND OBJECTIVE BOX
Pt seen by GI yesterday for disimpaction. Pt with mucus like bowel function but no significant stool. Seen by OBGYN, no acute interventions. Still reports being distended with left abdominal pain. leukocytosis resolved. Patient requesting new GI doctor?    Physical Exam:  Pt is AAOx3  General: Well developed, in no acute distress.   Chest: Lungs clear, no rales, no rhonchi, no wheezes.   Heart: RR, no murmurs, no rubs, no gallops.   Abdomen: Soft, mild LLQ tenderness, no masses, BS normal.    Back: Normal curvature, no tenderness.   Neuro: Physiological, no localizing findings.   Skin: Normal, no rashes, no lesions noted.   Extremities: Warm, well perfused, no edema, Pulses intact    Complete Blood Count + Automated Diff in AM (12.11.19 @ 06:36)    WBC Count: 7.55 K/uL    RBC Count: 3.16 M/uL    Hemoglobin: 9.9 g/dL    Hematocrit: 30.2 %    Mean Cell Volume: 95.6 fl    Mean Cell Hemoglobin: 31.3 pg    Mean Cell Hemoglobin Conc: 32.8 gm/dL    Red Cell Distrib Width: 12.4 %    Platelet Count - Automated: 288 K/uL    Auto Neutrophil #: 3.92 K/uL    Auto Lymphocyte #: 2.50 K/uL    Auto Monocyte #: 0.69 K/uL    Auto Eosinophil #: 0.37 K/uL    Auto Basophil #: 0.04 K/uL    Auto Neutrophil %: 52.0: Differential percentages must be correlated with absolute numbers for  clinical significance. %    Auto Lymphocyte %: 33.1 %    Auto Monocyte %: 9.1 %    Auto Eosinophil %: 4.9 %    Auto Basophil %: 0.5 %    Auto Immature Granulocyte %: 0.4 %

## 2019-12-12 ENCOUNTER — RESULT REVIEW (OUTPATIENT)
Age: 59
End: 2019-12-12

## 2019-12-12 LAB
ANION GAP SERPL CALC-SCNC: 4 MMOL/L — LOW (ref 5–17)
BASOPHILS # BLD AUTO: 0.03 K/UL — SIGNIFICANT CHANGE UP (ref 0–0.2)
BASOPHILS NFR BLD AUTO: 0.5 % — SIGNIFICANT CHANGE UP (ref 0–2)
BUN SERPL-MCNC: 1 MG/DL — LOW (ref 7–23)
CALCIUM SERPL-MCNC: 8.5 MG/DL — SIGNIFICANT CHANGE UP (ref 8.5–10.1)
CHLORIDE SERPL-SCNC: 106 MMOL/L — SIGNIFICANT CHANGE UP (ref 96–108)
CO2 SERPL-SCNC: 29 MMOL/L — SIGNIFICANT CHANGE UP (ref 22–31)
CREAT SERPL-MCNC: 0.59 MG/DL — SIGNIFICANT CHANGE UP (ref 0.5–1.3)
EOSINOPHIL # BLD AUTO: 0.31 K/UL — SIGNIFICANT CHANGE UP (ref 0–0.5)
EOSINOPHIL NFR BLD AUTO: 4.8 % — SIGNIFICANT CHANGE UP (ref 0–6)
GLUCOSE SERPL-MCNC: 95 MG/DL — SIGNIFICANT CHANGE UP (ref 70–99)
HCT VFR BLD CALC: 30.1 % — LOW (ref 34.5–45)
HGB BLD-MCNC: 10.2 G/DL — LOW (ref 11.5–15.5)
IMM GRANULOCYTES NFR BLD AUTO: 0.2 % — SIGNIFICANT CHANGE UP (ref 0–1.5)
LYMPHOCYTES # BLD AUTO: 2.2 K/UL — SIGNIFICANT CHANGE UP (ref 1–3.3)
LYMPHOCYTES # BLD AUTO: 34.1 % — SIGNIFICANT CHANGE UP (ref 13–44)
MCHC RBC-ENTMCNC: 31.5 PG — SIGNIFICANT CHANGE UP (ref 27–34)
MCHC RBC-ENTMCNC: 33.9 GM/DL — SIGNIFICANT CHANGE UP (ref 32–36)
MCV RBC AUTO: 92.9 FL — SIGNIFICANT CHANGE UP (ref 80–100)
MONOCYTES # BLD AUTO: 0.66 K/UL — SIGNIFICANT CHANGE UP (ref 0–0.9)
MONOCYTES NFR BLD AUTO: 10.2 % — SIGNIFICANT CHANGE UP (ref 2–14)
NEUTROPHILS # BLD AUTO: 3.25 K/UL — SIGNIFICANT CHANGE UP (ref 1.8–7.4)
NEUTROPHILS NFR BLD AUTO: 50.2 % — SIGNIFICANT CHANGE UP (ref 43–77)
PLATELET # BLD AUTO: 289 K/UL — SIGNIFICANT CHANGE UP (ref 150–400)
POTASSIUM SERPL-MCNC: 3.6 MMOL/L — SIGNIFICANT CHANGE UP (ref 3.5–5.3)
POTASSIUM SERPL-SCNC: 3.6 MMOL/L — SIGNIFICANT CHANGE UP (ref 3.5–5.3)
RBC # BLD: 3.24 M/UL — LOW (ref 3.8–5.2)
RBC # FLD: 12.3 % — SIGNIFICANT CHANGE UP (ref 10.3–14.5)
SODIUM SERPL-SCNC: 139 MMOL/L — SIGNIFICANT CHANGE UP (ref 135–145)
WBC # BLD: 6.46 K/UL — SIGNIFICANT CHANGE UP (ref 3.8–10.5)
WBC # FLD AUTO: 6.46 K/UL — SIGNIFICANT CHANGE UP (ref 3.8–10.5)

## 2019-12-12 PROCEDURE — 88305 TISSUE EXAM BY PATHOLOGIST: CPT | Mod: 26

## 2019-12-12 RX ORDER — MINERAL OIL
30 OIL (ML) MISCELLANEOUS
Refills: 0 | Status: DISCONTINUED | OUTPATIENT
Start: 2019-12-12 | End: 2019-12-13

## 2019-12-12 RX ORDER — NYSTATIN 500MM UNIT
400000 POWDER (EA) MISCELLANEOUS
Refills: 0 | Status: DISCONTINUED | OUTPATIENT
Start: 2019-12-12 | End: 2019-12-13

## 2019-12-12 RX ADMIN — Medication 200 MILLIGRAM(S): at 23:34

## 2019-12-12 RX ADMIN — TRAMADOL HYDROCHLORIDE 50 MILLIGRAM(S): 50 TABLET ORAL at 17:37

## 2019-12-12 RX ADMIN — ONDANSETRON 8 MILLIGRAM(S): 8 TABLET, FILM COATED ORAL at 06:33

## 2019-12-12 RX ADMIN — TRAMADOL HYDROCHLORIDE 50 MILLIGRAM(S): 50 TABLET ORAL at 18:42

## 2019-12-12 RX ADMIN — LOSARTAN POTASSIUM 25 MILLIGRAM(S): 100 TABLET, FILM COATED ORAL at 11:00

## 2019-12-12 RX ADMIN — Medication 400000 UNIT(S): at 23:34

## 2019-12-12 RX ADMIN — TRAMADOL HYDROCHLORIDE 50 MILLIGRAM(S): 50 TABLET ORAL at 11:09

## 2019-12-12 RX ADMIN — HEPARIN SODIUM 5000 UNIT(S): 5000 INJECTION INTRAVENOUS; SUBCUTANEOUS at 17:29

## 2019-12-12 RX ADMIN — FENTANYL CITRATE 1 PATCH: 50 INJECTION INTRAVENOUS at 06:09

## 2019-12-12 RX ADMIN — Medication 200 MILLIGRAM(S): at 18:36

## 2019-12-12 RX ADMIN — Medication 200 MILLIGRAM(S): at 17:30

## 2019-12-12 RX ADMIN — POLYETHYLENE GLYCOL 3350 17 GRAM(S): 17 POWDER, FOR SOLUTION ORAL at 17:29

## 2019-12-12 RX ADMIN — Medication 30 MILLILITER(S): at 14:21

## 2019-12-12 RX ADMIN — TRAMADOL HYDROCHLORIDE 50 MILLIGRAM(S): 50 TABLET ORAL at 11:30

## 2019-12-12 RX ADMIN — Medication 200 MILLIGRAM(S): at 10:11

## 2019-12-12 RX ADMIN — FENTANYL CITRATE 1 PATCH: 50 INJECTION INTRAVENOUS at 17:40

## 2019-12-12 RX ADMIN — Medication 200 MILLIGRAM(S): at 05:16

## 2019-12-12 RX ADMIN — Medication 133 MILLILITER(S): at 17:30

## 2019-12-12 RX ADMIN — Medication 100 MICROGRAM(S): at 11:00

## 2019-12-12 NOTE — PROGRESS NOTE ADULT - ASSESSMENT
58 y/o female with a PMHx of back pain, Hashimoto's, Herniated nucleus pulposus, HTN, HLD presents to the ED c/o constipation and incr abd pain for the last week since taking Imodium for diarrhea which started the weekend after Thanksgiving.   Pt c/o intermittent diarrhea, subjective fever. Denies urinary symptoms, n/v. Last colonoscopy 2 years ago resulting normal. Here afebrile, wbc ct 18, CT abd/pelvis stercoral colitis, was eval by GI given cipro/flagyl.     1. abdominal pain. stercoral colitis. leukocytosis  - bowel regimen, gi/gyn eval noted, colonoscopy per gi  - wbc ct likely reactive, now normalized   - on cipro/flagyl #2  - urine cx/blood cx no growth  - monitor temps  - tolerating abx well so far; no side effects noted  - reason for abx use and side effects reviewed with patient  - supportive care  - fu cbc    2. other issues - care per medicine 60 y/o female with a PMHx of back pain, Hashimoto's, Herniated nucleus pulposus, HTN, HLD presents to the ED c/o constipation and incr abd pain for the last week since taking Imodium for diarrhea which started the weekend after Thanksgiving.   Pt c/o intermittent diarrhea, subjective fever. Denies urinary symptoms, n/v. Last colonoscopy 2 years ago resulting normal. Here afebrile, wbc ct 18, CT abd/pelvis stercoral colitis, was eval by GI given cipro/flagyl.     1. abdominal pain. stercoral colitis. resolved leukocytosis  - sp colonpscopy/on bowel regimen for fecal impaction  - wbc ct likely reactive, now normalized   - on cipro/flagyl #3  - continue with abx coverage  - urine cx/blood cx no growth  - monitor temps  - tolerating abx well so far; no side effects noted  - reason for abx use and side effects reviewed with patient  - supportive care  - fu cbc    2. other issues - care per medicine

## 2019-12-12 NOTE — PROGRESS NOTE ADULT - SUBJECTIVE AND OBJECTIVE BOX
Date of service: 12-12-19 @ 12:42    pt seen and examined    afebrile    ROS: no fever or chills; denies dizziness, no HA, no SOB or cough, no diarrhea or constipation; no dysuria, no urinary frequency, no legs pain, no rashes      MEDICATIONS  (STANDING):  ciprofloxacin   IVPB 400 milliGRAM(s) IV Intermittent two times a day  fentaNYL   Patch  12 MICROgram(s)/Hr 1 Patch Transdermal every 72 hours  heparin  Injectable 5000 Unit(s) SubCutaneous every 12 hours  influenza   Vaccine 0.5 milliLiter(s) IntraMuscular once  levothyroxine 100 MICROGram(s) Oral daily  losartan 25 milliGRAM(s) Oral daily  metroNIDAZOLE  IVPB 500 milliGRAM(s) IV Intermittent every 8 hours  mineral oil 30 milliLiter(s) Oral two times a day  mineral oil enema 133 milliLiter(s) Rectal two times a day  polyethylene glycol 3350 17 Gram(s) Oral every 12 hours  traMADol 50 milliGRAM(s) Oral every 12 hours      Vital Signs Last 24 Hrs  T(C): 36.6 (12 Dec 2019 11:21), Max: 36.8 (12 Dec 2019 05:11)  T(F): 97.9 (12 Dec 2019 11:21), Max: 98.3 (12 Dec 2019 05:11)  HR: 64 (12 Dec 2019 11:21) (54 - 64)  BP: 127/74 (12 Dec 2019 11:21) (109/65 - 127/74)  BP(mean): --  RR: 16 (12 Dec 2019 11:21) (16 - 16)  SpO2: 100% (12 Dec 2019 11:21) (95% - 100%)            PE:  Constitutional: frail looking  HEENT: NC/AT, EOMI, PERRLA, conjunctivae clear; ears and nose atraumatic; pharynx benign  Neck: supple; thyroid not palpable  Back: no tenderness  Respiratory: respiratory effort normal; clear to auscultation  Cardiovascular: S1S2 regular, no murmurs  Abdomen: soft, not tender, distended, positive BS; liver and spleen WNL  Genitourinary: no suprapubic tenderness  Lymphatic: no LN palpable  Musculoskeletal: no muscle tenderness, no joint swelling or tenderness  Extremities: no pedal edema  Neurological/ Psychiatric:  moving all extremities  Skin: no rashes; no palpable lesions    Labs: all available labs reviewed                        9.9    7.55  )-----------( 288      ( 11 Dec 2019 06:36 )             30.2     12-11    142  |  109<H>  |  3<L>  ----------------------------<  112<H>  3.5   |  28  |  0.56    Ca    8.1<L>      11 Dec 2019 06:36    TPro  6.1  /  Alb  2.9<L>  /  TBili  0.3  /  DBili  x   /  AST  22  /  ALT  19  /  AlkPhos  57  12-11           Cultures:     Culture - Urine (12.09.19 @ 15:42)    Specimen Source: .Urine None    Culture Results:   <10,000 CFU/mL Normal Urogenital Tiffani    Culture - Blood (12.09.19 @ 10:56)    Specimen Source: .Blood None    Culture Results:   No growth to date.        Radiology: all available radiological tests reviewed    EXAM:  CT ABDOMEN AND PELVIS OC                            PROCEDURE DATE:  12/09/2019          INTERPRETATION:  CLINICAL INFORMATION: Abdominal pain    COMPARISON: None.    PROCEDURE:   CT of the Abdomen and Pelvis was performed without intravenous contrast.   Intravenous contrast: None.  Oral contrast: positive contrast was administered.  Sagittal and coronal reformats were performed.    FINDINGS:    LOWER CHEST: Coronary artery calcifications. Clear lung bases.    LIVER: Within normal limits.  BILE DUCTS: Normal caliber.  GALLBLADDER: Within normal limits.  SPLEEN: Within normal limits.  PANCREAS: Within normal limits.  ADRENALS: Within normal limits.  KIDNEYS/URETERS: Within normal limits.    PELVIS: Evaluation of the lower pelvis is limited due to beam hardening   artifact from the patient's bilateral hip arthroplasties. The bladder,   uterus and adnexa are within normal limits.    BOWEL: Large amount of stool seen throughout the colon with wall   thickening of the descending and sigmoid colon. No extraluminal gas.   Normal appendix.    PERITONEUM: No ascites.  VESSELS: Within normal limits.  RETROPERITONEUM/LYMPH NODES: No lymphadenopathy.    ABDOMINAL WALL: Right buttock spinal stimulator.  BONES: Bilateral hip arthroplasties. Moderate L1 compression deformity,   status post vertebroplasty. Orthopedic hardware in the L3-L5 vertebral   bodies with L3-L4 disc spacer. No acute abnormality.      IMPRESSION:     Stercoral colitis involving the descending and sigmoid colon.  Marked amount of stool throughout the colon.      Advanced directives addressed: full resuscitation Date of service: 12-12-19 @ 12:42    pt seen and examined  s/p colonoscopy noted with stricture in sigmoid colon/stool balls   less abd discomfort  afebrile    ROS: no fever or chills; denies dizziness, no HA, no SOB or cough, no diarrhea,; no dysuria, no urinary frequency, no legs pain, no rashes      MEDICATIONS  (STANDING):  ciprofloxacin   IVPB 400 milliGRAM(s) IV Intermittent two times a day  fentaNYL   Patch  12 MICROgram(s)/Hr 1 Patch Transdermal every 72 hours  heparin  Injectable 5000 Unit(s) SubCutaneous every 12 hours  influenza   Vaccine 0.5 milliLiter(s) IntraMuscular once  levothyroxine 100 MICROGram(s) Oral daily  losartan 25 milliGRAM(s) Oral daily  metroNIDAZOLE  IVPB 500 milliGRAM(s) IV Intermittent every 8 hours  mineral oil 30 milliLiter(s) Oral two times a day  mineral oil enema 133 milliLiter(s) Rectal two times a day  polyethylene glycol 3350 17 Gram(s) Oral every 12 hours  traMADol 50 milliGRAM(s) Oral every 12 hours      Vital Signs Last 24 Hrs  T(C): 36.6 (12 Dec 2019 11:21), Max: 36.8 (12 Dec 2019 05:11)  T(F): 97.9 (12 Dec 2019 11:21), Max: 98.3 (12 Dec 2019 05:11)  HR: 64 (12 Dec 2019 11:21) (54 - 64)  BP: 127/74 (12 Dec 2019 11:21) (109/65 - 127/74)  BP(mean): --  RR: 16 (12 Dec 2019 11:21) (16 - 16)  SpO2: 100% (12 Dec 2019 11:21) (95% - 100%)      PE:  Constitutional: frail looking  HEENT: NC/AT, EOMI, PERRLA, conjunctivae clear; ears and nose atraumatic; pharynx benign  Neck: supple; thyroid not palpable  Back: no tenderness  Respiratory: respiratory effort normal; clear to auscultation  Cardiovascular: S1S2 regular, no murmurs  Abdomen: soft, not tender, distended, positive BS; liver and spleen WNL  Genitourinary: no suprapubic tenderness  Lymphatic: no LN palpable  Musculoskeletal: no muscle tenderness, no joint swelling or tenderness  Extremities: no pedal edema  Neurological/ Psychiatric:  moving all extremities  Skin: no rashes; no palpable lesions    Labs: all available labs reviewed                        10.2   6.46  )-----------( 289      ( 12 Dec 2019 12:03 )             30.1     12-12    139  |  106  |  1<L>  ----------------------------<  95  3.6   |  29  |  0.59    Ca    8.5      12 Dec 2019 12:03    TPro  6.1  /  Alb  2.9<L>  /  TBili  0.3  /  DBili  x   /  AST  22  /  ALT  19  /  AlkPhos  57  12-11             Culture - Urine (12.09.19 @ 15:42)    Specimen Source: .Urine None    Culture Results:   <10,000 CFU/mL Normal Urogenital Tiffani    Culture - Blood (12.09.19 @ 10:56)    Specimen Source: .Blood None    Culture Results:   No growth to date.        Radiology: all available radiological tests reviewed    EXAM:  CT ABDOMEN AND PELVIS OC                            PROCEDURE DATE:  12/09/2019          INTERPRETATION:  CLINICAL INFORMATION: Abdominal pain    COMPARISON: None.    PROCEDURE:   CT of the Abdomen and Pelvis was performed without intravenous contrast.   Intravenous contrast: None.  Oral contrast: positive contrast was administered.  Sagittal and coronal reformats were performed.    FINDINGS:    LOWER CHEST: Coronary artery calcifications. Clear lung bases.    LIVER: Within normal limits.  BILE DUCTS: Normal caliber.  GALLBLADDER: Within normal limits.  SPLEEN: Within normal limits.  PANCREAS: Within normal limits.  ADRENALS: Within normal limits.  KIDNEYS/URETERS: Within normal limits.    PELVIS: Evaluation of the lower pelvis is limited due to beam hardening   artifact from the patient's bilateral hip arthroplasties. The bladder,   uterus and adnexa are within normal limits.    BOWEL: Large amount of stool seen throughout the colon with wall   thickening of the descending and sigmoid colon. No extraluminal gas.   Normal appendix.    PERITONEUM: No ascites.  VESSELS: Within normal limits.  RETROPERITONEUM/LYMPH NODES: No lymphadenopathy.    ABDOMINAL WALL: Right buttock spinal stimulator.  BONES: Bilateral hip arthroplasties. Moderate L1 compression deformity,   status post vertebroplasty. Orthopedic hardware in the L3-L5 vertebral   bodies with L3-L4 disc spacer. No acute abnormality.      IMPRESSION:     Stercoral colitis involving the descending and sigmoid colon.  Marked amount of stool throughout the colon.      Advanced directives addressed: full resuscitation

## 2019-12-12 NOTE — PROGRESS NOTE ADULT - ASSESSMENT
58 yo female with stercoral colitis      Plan:    -Fluid resuscitation and bowel rest  -No surgical intervention at this time  -f/u GI recs  -F/U colonscopy disimpaction by GI today   -GYN recs appreciated, no acute GYN pathology  -Stool softeners and enemas   -Monitor bowel function  -Pain control with non-narcotics    The plan was discussed with Dr. Brunner 60 yo female with stercoral colitis      Plan:    -Fluid resuscitation and bowel rest  -No surgical intervention at this time  -f/u GI recs  -F/U colonscopy disimpaction by GI today   -GYN recs appreciated, no acute GYN pathology  -Stool softeners and enemas   -Monitor bowel function  -Pain control with non-narcotics

## 2019-12-12 NOTE — PROGRESS NOTE ADULT - SUBJECTIVE AND OBJECTIVE BOX
60 y/o female with a PMHx of back pain, Hashimoto's, Herniated nucleus pulposus, HTN, HLD presents to the ED c/o constipation and incr abd pain for the last week since taking Imodium for diarrhea which started the weekend after Thanksgiving.   Pt c/o intermittent diarrhea, subjective fever.    12/12- afebrile , INCOMPLETE DISIMPACTION WITH COLONOSCOPY 12/12,     Constitutional: NAD  HEENT: Atraumatic, APRIL, Normal, No congestion  Respiratory: Breath Sounds normal, no rhonchi/wheeze  Cardiovascular: N S1S2;   Gastrointestinal: Abdomen soft,nt, no rigidity  Extremities: No edema, peripheral pulses present  Neurological: AAO x 3, no gross focal motor deficits      PHYSICAL EXAM:    Daily     Daily     ICU Vital Signs Last 24 Hrs  T(C): 36.6 (12 Dec 2019 11:21), Max: 36.8 (12 Dec 2019 05:11)  T(F): 97.9 (12 Dec 2019 11:21), Max: 98.3 (12 Dec 2019 05:11)  HR: 64 (12 Dec 2019 11:21) (54 - 64)  BP: 127/74 (12 Dec 2019 11:21) (109/65 - 127/74)  BP(mean): --  ABP: --  ABP(mean): --  RR: 16 (12 Dec 2019 11:21) (16 - 16)  SpO2: 100% (12 Dec 2019 11:21) (95% - 100%)                                10.2   6.46  )-----------( 289      ( 12 Dec 2019 12:03 )             30.1       CBC Full  -  ( 12 Dec 2019 12:03 )  WBC Count : 6.46 K/uL  RBC Count : 3.24 M/uL  Hemoglobin : 10.2 g/dL  Hematocrit : 30.1 %  Platelet Count - Automated : 289 K/uL  Mean Cell Volume : 92.9 fl  Mean Cell Hemoglobin : 31.5 pg  Mean Cell Hemoglobin Concentration : 33.9 gm/dL  Auto Neutrophil # : 3.25 K/uL  Auto Lymphocyte # : 2.20 K/uL  Auto Monocyte # : 0.66 K/uL  Auto Eosinophil # : 0.31 K/uL  Auto Basophil # : 0.03 K/uL  Auto Neutrophil % : 50.2 %  Auto Lymphocyte % : 34.1 %  Auto Monocyte % : 10.2 %  Auto Eosinophil % : 4.8 %  Auto Basophil % : 0.5 %      12-12    139  |  106  |  1<L>  ----------------------------<  95  3.6   |  29  |  0.59    Ca    8.5      12 Dec 2019 12:03    TPro  6.1  /  Alb  2.9<L>  /  TBili  0.3  /  DBili  x   /  AST  22  /  ALT  19  /  AlkPhos  57  12-11      LIVER FUNCTIONS - ( 11 Dec 2019 06:36 )  Alb: 2.9 g/dL / Pro: 6.1 gm/dL / ALK PHOS: 57 U/L / ALT: 19 U/L / AST: 22 U/L / GGT: x                               MEDICATIONS  (STANDING):  ciprofloxacin   IVPB 400 milliGRAM(s) IV Intermittent two times a day  fentaNYL   Patch  12 MICROgram(s)/Hr 1 Patch Transdermal every 72 hours  heparin  Injectable 5000 Unit(s) SubCutaneous every 12 hours  influenza   Vaccine 0.5 milliLiter(s) IntraMuscular once  levothyroxine 100 MICROGram(s) Oral daily  losartan 25 milliGRAM(s) Oral daily  metroNIDAZOLE  IVPB 500 milliGRAM(s) IV Intermittent every 8 hours  mineral oil 30 milliLiter(s) Oral two times a day  mineral oil enema 133 milliLiter(s) Rectal two times a day  polyethylene glycol 3350 17 Gram(s) Oral every 12 hours  traMADol 50 milliGRAM(s) Oral every 12 hours

## 2019-12-12 NOTE — PROGRESS NOTE ADULT - ASSESSMENT
Stercoral colitis   Chronic constipation  Dehydration  Leukocytosis  S/P COLONOSCOPY 12/12 INCOMPLETE FECAL DISIMPACTION   NOW ON MINERAL OIL PO AND HIGH FIBER DIET  DISCHARGE PLANNING ONCE HAS BOWEL MOVEMENT   - cont Cipro flagyl  - miralx, mineral oil enema   -GYN /GI AND SURG, ID CONSULTS APPRECIATED   - DVT prophylaxis : heparin

## 2019-12-12 NOTE — PROGRESS NOTE ADULT - SUBJECTIVE AND OBJECTIVE BOX
Pt seen by GI yesterday for disimpaction. Pt with mucus like bowel function but no significant stool. Seen by OBGYN, no acute interventions. Still reports being distended with left abdominal pain. leukocytosis resolved. GI doing coloscopy for the patient today.      ICU Vital Signs Last 24 Hrs  T(C): 36.8 (12 Dec 2019 05:11), Max: 36.8 (12 Dec 2019 05:11)  T(F): 98.3 (12 Dec 2019 05:11), Max: 98.3 (12 Dec 2019 05:11)  HR: 54 (12 Dec 2019 05:11) (54 - 59)  BP: 109/65 (12 Dec 2019 05:11) (109/65 - 118/82)  BP(mean): --  ABP: --  ABP(mean): --  RR: 16 (12 Dec 2019 05:11) (16 - 18)  SpO2: 95% (12 Dec 2019 05:11) (95% - 98%)    Physical Exam:  Pt is AAOx3  General: Well developed, in no acute distress.   Chest: Lungs clear, no rales, no rhonchi, no wheezes.   Heart: RR, no murmurs, no rubs, no gallops.   Abdomen: Soft, mild LLQ tenderness, no masses, BS normal.    Back: Normal curvature, no tenderness.   Neuro: Physiological, no localizing findings.   Skin: Normal, no rashes, no lesions noted.   Extremities: Warm, well perfused, no edema, Pulses intact                          9.9    7.55  )-----------( 288      ( 11 Dec 2019 06:36 )             30.2     12-11    142  |  109<H>  |  3<L>  ----------------------------<  112<H>  3.5   |  28  |  0.56    Ca    8.1<L>      11 Dec 2019 06:36    TPro  6.1  /  Alb  2.9<L>  /  TBili  0.3  /  DBili  x   /  AST  22  /  ALT  19  /  AlkPhos  57  12-11

## 2019-12-13 ENCOUNTER — TRANSCRIPTION ENCOUNTER (OUTPATIENT)
Age: 59
End: 2019-12-13

## 2019-12-13 VITALS
HEART RATE: 54 BPM | TEMPERATURE: 99 F | DIASTOLIC BLOOD PRESSURE: 75 MMHG | RESPIRATION RATE: 16 BRPM | OXYGEN SATURATION: 97 % | SYSTOLIC BLOOD PRESSURE: 133 MMHG

## 2019-12-13 RX ORDER — NYSTATIN 500MM UNIT
4 POWDER (EA) MISCELLANEOUS
Qty: 160 | Refills: 0
Start: 2019-12-13 | End: 2019-12-22

## 2019-12-13 RX ORDER — POLYETHYLENE GLYCOL 3350 17 G/17G
17 POWDER, FOR SOLUTION ORAL
Qty: 255 | Refills: 0
Start: 2019-12-13 | End: 2019-12-27

## 2019-12-13 RX ORDER — PANTOPRAZOLE SODIUM 20 MG/1
1 TABLET, DELAYED RELEASE ORAL
Qty: 10 | Refills: 0
Start: 2019-12-13 | End: 2019-12-22

## 2019-12-13 RX ORDER — METRONIDAZOLE 500 MG
1 TABLET ORAL
Qty: 18 | Refills: 0
Start: 2019-12-13 | End: 2019-12-18

## 2019-12-13 RX ORDER — MINERAL OIL
30 OIL (ML) MISCELLANEOUS
Qty: 600 | Refills: 0
Start: 2019-12-13 | End: 2019-12-22

## 2019-12-13 RX ORDER — CIPROFLOXACIN LACTATE 400MG/40ML
1 VIAL (ML) INTRAVENOUS
Qty: 12 | Refills: 0
Start: 2019-12-13 | End: 2019-12-18

## 2019-12-13 RX ORDER — LOSARTAN POTASSIUM 100 MG/1
1 TABLET, FILM COATED ORAL
Qty: 0 | Refills: 0 | DISCHARGE

## 2019-12-13 RX ORDER — LOSARTAN POTASSIUM 100 MG/1
1 TABLET, FILM COATED ORAL
Qty: 0 | Refills: 0 | DISCHARGE
Start: 2019-12-13

## 2019-12-13 RX ADMIN — Medication 200 MILLIGRAM(S): at 05:13

## 2019-12-13 RX ADMIN — Medication 400000 UNIT(S): at 11:44

## 2019-12-13 RX ADMIN — Medication 133 MILLILITER(S): at 05:36

## 2019-12-13 RX ADMIN — ONDANSETRON 8 MILLIGRAM(S): 8 TABLET, FILM COATED ORAL at 05:35

## 2019-12-13 RX ADMIN — HEPARIN SODIUM 5000 UNIT(S): 5000 INJECTION INTRAVENOUS; SUBCUTANEOUS at 05:14

## 2019-12-13 RX ADMIN — LOSARTAN POTASSIUM 25 MILLIGRAM(S): 100 TABLET, FILM COATED ORAL at 05:36

## 2019-12-13 RX ADMIN — Medication 30 MILLILITER(S): at 05:13

## 2019-12-13 RX ADMIN — POLYETHYLENE GLYCOL 3350 17 GRAM(S): 17 POWDER, FOR SOLUTION ORAL at 05:14

## 2019-12-13 RX ADMIN — Medication 400000 UNIT(S): at 05:14

## 2019-12-13 RX ADMIN — Medication 200 MILLIGRAM(S): at 06:08

## 2019-12-13 RX ADMIN — Medication 200 MILLIGRAM(S): at 14:44

## 2019-12-13 RX ADMIN — Medication 100 MICROGRAM(S): at 05:14

## 2019-12-13 RX ADMIN — TRAMADOL HYDROCHLORIDE 50 MILLIGRAM(S): 50 TABLET ORAL at 05:25

## 2019-12-13 RX ADMIN — INFLUENZA VIRUS VACCINE 0.5 MILLILITER(S): 15; 15; 15; 15 SUSPENSION INTRAMUSCULAR at 11:43

## 2019-12-13 NOTE — CONSULT NOTE ADULT - ASSESSMENT
Imp:  My sense from reviewing the CT and colonoscopy is that the underlying colon structure is OK but that she probably has some degree of colon dysmotility.  It's uncler to me that she'll be able to the clear the stool without surgery    Rec:  Agree with miralax and mineral oil strategy as it may avoid surgery  Follow up with Dr. Mi  If impaction doesn't pass, would need surgery

## 2019-12-13 NOTE — DISCHARGE NOTE PROVIDER - CARE PROVIDER_API CALL
Sara Duarnt ()  Gastroenterology; Internal Medicine  [address] [facility], [address]  [place] [address]  Phone: [phone]  Fax: [phone]  Follow Up Time:     Babita Beck)  Internal Medicine  [address] [place] [address]  Phone: [phone]  Fax: [phone]  Follow Up Time:

## 2019-12-13 NOTE — DISCHARGE NOTE PROVIDER - CARE PROVIDERS DIRECT ADDRESSES
[email or web address]

## 2019-12-13 NOTE — DISCHARGE NOTE PROVIDER - NSDCCPCAREPLAN_GEN_ALL_CORE_FT
PRINCIPAL DISCHARGE DIAGNOSIS  Diagnosis: Colitis  Assessment and Plan of Treatment: plan to see Dr lynn sheikh  within 5 days  for ballon dilation  and further management, if you have anby fever or worsening of abdominal pain then call 911 or return to ED      SECONDARY DISCHARGE DIAGNOSES  Diagnosis: Fecal impaction of colon  Assessment and Plan of Treatment:

## 2019-12-13 NOTE — DISCHARGE NOTE NURSING/CASE MANAGEMENT/SOCIAL WORK - PATIENT PORTAL LINK FT
You can access the FollowMyHealth Patient Portal offered by [facility] by registering at the following website: [email or web address]. By joining [employer]’s FollowMyHealth portal, you will also be able to view your health information using other applications (apps) compatible with our system.

## 2019-12-13 NOTE — PROGRESS NOTE ADULT - SUBJECTIVE AND OBJECTIVE BOX
Patient is a 59y old  Female who presents with a chief complaint of stercoral colitis  Chronic constipation  Dehydration  Leukocytosis (13 Dec 2019 08:26)      HPI:  Pt is a 60 y/o female with a PMHx of back pain, Hashimoto's, Herniated nucleus pulposus, HTN, HLD presents to the ED c/o constipation and incr abd pain for the last week since taking Imodium for diarrhea which started the weekend after Thanksgiving.   Pt c/o intermittent diarrhea, subjective fever.     Denies urinary symptoms, n/v. Last colonoscopy 2 years ago resulting normal    December 13  Patient comfortable, has had chronic constipation  Negative nausea or vomiting. Tolerating diet. Had some soft/liquid stool.       Allergies: Penicillin, IV contrast. No other complaints at this time.    Fam Hx:  Pt is adopted.  She reports her children are healthy (09 Dec 2019 15:39)      PAST MEDICAL & SURGICAL HISTORY:  Osteoporosis  Fracture: Lumbar 1  HLD (hyperlipidemia): diet controlled  Herniated nucleus pulposus, thoracic  Herniated nucleus pulposus, lumbar  HTN (hypertension)  Hashimoto's disease  Back pain  S/P insertion of spinal cord stimulator: 10/2017  S/P hip replacement, left: 2017  History of tonsillectomy  S/P arthroscopy of right knee: 7/2016 and done prior about 10 years ago  S/P cervical discectomy: 5/2013  S/P lumbar fusion: 7/2012 .3/ 2017  S/P hip replacement, right: 2012      MEDICATIONS  (STANDING):  ciprofloxacin   IVPB 400 milliGRAM(s) IV Intermittent two times a day  fentaNYL   Patch  12 MICROgram(s)/Hr 1 Patch Transdermal every 72 hours  heparin  Injectable 5000 Unit(s) SubCutaneous every 12 hours  influenza   Vaccine 0.5 milliLiter(s) IntraMuscular once  levothyroxine 100 MICROGram(s) Oral daily  losartan 25 milliGRAM(s) Oral daily  metroNIDAZOLE  IVPB 500 milliGRAM(s) IV Intermittent every 8 hours  mineral oil 30 milliLiter(s) Oral two times a day  mineral oil enema 133 milliLiter(s) Rectal two times a day  nystatin    Suspension 633976 Unit(s) Oral four times a day  polyethylene glycol 3350 17 Gram(s) Oral every 12 hours  traMADol 50 milliGRAM(s) Oral every 12 hours    MEDICATIONS  (PRN):  morphine  - Injectable 4 milliGRAM(s) IV Push every 4 hours PRN Moderate Pain (4 - 6)  ondansetron Injectable 8 milliGRAM(s) IV Push four times a day PRN nausea and 30 minutes prior to golytly      Allergies    adhesives (Rash; Blisters; Hives)  IV Contrast (Hives)  Lyrica (Other)  penicillin (Hives)    Intolerances        SOCIAL HISTORY:  NC  FAMILY HISTORY:NC      REVIEW OF SYSTEMS:    CONSTITUTIONAL: No weakness, fevers or chills  EYES/ENT: No visual changes;  No vertigo or throat pain   NECK: No pain or stiffness  RESPIRATORY: No cough, wheezing, hemoptysis; No shortness of breath  CARDIOVASCULAR: No chest pain or palpitations  GENITOURINARY: No dysuria, frequency or hematuria  NEUROLOGICAL: No numbness or weakness  SKIN: No itching, burning, rashes, or lesions   All other review of systems is negative unless indicated above.    Vital Signs Last 24 Hrs  T(C): 36.8 (13 Dec 2019 05:15), Max: 36.8 (13 Dec 2019 05:15)  T(F): 98.2 (13 Dec 2019 05:15), Max: 98.2 (13 Dec 2019 05:15)  HR: 54 (13 Dec 2019 05:15) (54 - 70)  BP: 113/60 (13 Dec 2019 05:15) (113/60 - 127/74)  BP(mean): --  RR: 17 (13 Dec 2019 05:15) (16 - 17)  SpO2: 100% (13 Dec 2019 05:15) (97% - 100%)    PHYSICAL EXAM:    Constitutional: NAD, well-developed  HEENT: EOMI, throat clear  Neck: No LAD, supple  Respiratory: CTA and P  Cardiovascular: S1 and S2, RRR, no M  Gastrointestinal: BS+, soft, Mild LLQ tend/ND, neg HSM,  Extremities: No peripheral edema, neg clubing, cyanosis  Vascular: 2+ peripheral pulses  Neurological: A/O x 3, no focal deficits  Psychiatric: Normal mood, normal affect  Skin: No rashes    LABS:  CBC Full  -  ( 12 Dec 2019 12:03 )  WBC Count : 6.46 K/uL  RBC Count : 3.24 M/uL  Hemoglobin : 10.2 g/dL  Hematocrit : 30.1 %  Platelet Count - Automated : 289 K/uL  Mean Cell Volume : 92.9 fl  Mean Cell Hemoglobin : 31.5 pg  Mean Cell Hemoglobin Concentration : 33.9 gm/dL  Auto Neutrophil # : 3.25 K/uL  Auto Lymphocyte # : 2.20 K/uL  Auto Monocyte # : 0.66 K/uL  Auto Eosinophil # : 0.31 K/uL  Auto Basophil # : 0.03 K/uL  Auto Neutrophil % : 50.2 %  Auto Lymphocyte % : 34.1 %  Auto Monocyte % : 10.2 %  Auto Eosinophil % : 4.8 %  Auto Basophil % : 0.5 %    12-12    139  |  106  |  1<L>  ----------------------------<  95  3.6   |  29  |  0.59    Ca    8.5      12 Dec 2019 12:03              RADIOLOGY & ADDITIONAL STUDIES:

## 2019-12-13 NOTE — PROGRESS NOTE ADULT - ASSESSMENT
58 y/o female with a PMHx of back pain, Hashimoto's, Herniated nucleus pulposus, HTN, HLD presents to the ED c/o constipation and incr abd pain for the last week since taking Imodium for diarrhea which started the weekend after Thanksgiving.   Pt c/o intermittent diarrhea, subjective fever. Denies urinary symptoms, n/v. Last colonoscopy 2 years ago resulting normal. Here afebrile, wbc ct 18, CT abd/pelvis stercoral colitis, was eval by GI given cipro/flagyl.     1. abdominal pain. stercoral colitis. resolved leukocytosis  - sp colonpscopy/on bowel regimen for fecal impaction  - wbc ct likely reactive, now normalized   - on cipro/flagyl #5 plan 10 day course total  - continue with abx coverage  - urine cx/blood cx no growth  - GI eval noted  - monitor temps  - tolerating abx well so far; no side effects noted  - reason for abx use and side effects reviewed with patient  - supportive care  - fu cbc    2. other issues - care per medicine

## 2019-12-13 NOTE — DISCHARGE NOTE PROVIDER - NSDCMRMEDTOKEN_GEN_ALL_CORE_FT
ciprofloxacin 500 mg oral tablet: 1 tab(s) orally every 12 hours  Flagyl 500 mg oral tablet: 1 tab(s) orally 3 times a day  levothyroxine 100 mcg (0.1 mg) oral tablet: 1 tab(s) orally once a day  losartan 25 mg oral tablet: 1 tab(s) orally once a day  mineral oil oral liquid: 30 milliliter(s) orally 2 times a day  nabumetone 750 mg oral tablet: 1 tab(s) orally 2 times a day as needed   nystatin 100,000 units/mL oral suspension: 4 milliliter(s) orally 4 times a day  pantoprazole 40 mg oral delayed release tablet: 1 tab(s) orally once a day  polyethylene glycol 3350 oral powder for reconstitution: 17 gram(s) orally every 12 hours  traMADol 50 mg oral tablet: 1-2 tablets twice daily in the morning and afternoon  Vitamin D3 1000 intl units oral capsule: 1 cap(s) orally once a day (in the evening)

## 2019-12-13 NOTE — PROGRESS NOTE ADULT - REASON FOR ADMISSION
stercoral colitis  Chronic constipation  Dehydration  Leukocytosis

## 2019-12-13 NOTE — PROGRESS NOTE ADULT - SUBJECTIVE AND OBJECTIVE BOX
Pt seen by GI yesterday for colonoscopy. Colonoscopy shows sigmoid stricture and 5cm hard firm ball of stool. Pt tolerating clear liquid diet but no flatus or bowel function currently    Vital Signs Last 24 Hrs  T(C): 36.8 (13 Dec 2019 05:15), Max: 36.8 (13 Dec 2019 05:15)  T(F): 98.2 (13 Dec 2019 05:15), Max: 98.2 (13 Dec 2019 05:15)  HR: 54 (13 Dec 2019 05:15) (54 - 70)  BP: 113/60 (13 Dec 2019 05:15) (113/60 - 127/74)  BP(mean): --  RR: 17 (13 Dec 2019 05:15) (16 - 17)  SpO2: 100% (13 Dec 2019 05:15) (97% - 100%)    Physical Exam:  Pt is AAOx3  General: Well developed, in no acute distress.   Chest: Lungs clear, no rales, no rhonchi, no wheezes.   Heart: RR, no murmurs, no rubs, no gallops.   Abdomen: Soft, mild LLQ tenderness, no masses, BS normal.    Back: Normal curvature, no tenderness.   Neuro: Physiological, no localizing findings.   Skin: Normal, no rashes, no lesions noted.   Extremities: Warm, well perfused, no edema, Pulses intact    < from: Colonoscopy (12.12.19 @ 07:05) >    REPORTHEADER [facility]    REPORTHEADER GI     PATIENTNAME Patient Name: Kait Quezada    EXAMDATE Procedure Date: 12/12/2019 7:05 AM    PATIENTACCOUNTNUM Account Number: [id number]    PATIENTDOB YOB: 1960    ADMITTYPE Admit Type: Inpatient    PATIENTROOM Room: [facility]    PATGENDER Gender: Female    ENDOPROCEDURENAME Procedure:           Colonoscopy    INDICATION Indications:         Abdominal pain in the left lower quadrant    ENDOPROCEDURETEXT Procedure:           Pre-Anesthesia Assessment:    ENDOPROCEDURETEXT                      - Prior to the procedure, a History and Physical was     ENDOPROCEDURETEXT                      performed, and patient medications and allergies were     ENDOPROCEDURETEXT         reviewed. The patient's tolerance of previous anesthesia     ENDOPROCEDURETEXT                      was also reviewed. The risks and benefits of the     ENDOPROCEDURETEXT                      procedure and the sedation options and risks were    ENDOPROCEDURETEXT                      discussed with the patient. All questions were answered,     ENDOPROCEDURETEXT                      and informed consent was obtained. Prior Anticoagulants:     ENDOPROCEDURETEXT                      The patient has taken no previous anticoagulant or     ENDOPROCEDURETEXT                      antiplatelet agents. ASA Grade Assessment: III - A     ENDOPROCEDURETEXT                      patient with severe systemic disease. After reviewing     ENDOPROCEDURETEXT                      the risks and benefits, the patient was deemed in     ENDOPROCEDURETEXT                      satisfactory condition to undergo the procedure.    ENDOPROCEDURETEXT                      After I obtained informed consent, the scope was passed     ENDOPROCEDURETEXT                      under direct vision. Throughout the procedure, the     ENDOPROCEDURETEXT                      patient's blood pressure, pulse, and oxygen saturations     ENDOPROCEDURETEXTwere monitored continuously. The Colonoscope was     ENDOPROCEDURETEXT                      introduced through the anus and advanced to EAGLE cecum,     ENDOPROCEDURETEXT                      identified by ileocecal valve. The colonoscopy was     ENDOPROCEDURETEXT                      performed with difficulty due to inadequate bowel prep.     ENDOPROCEDURETEXT                      The patient tolerated the procedure well. The quality of     ENDOPROCEDURETEXT                      the bowel preparation was poor. No anatomical landmarks     ENDOPROCEDURETEXT                      were photographed.    PRIMARYPROVIDER Providers:           Dagoberto Mi MD    CURRENT_MEDS Medicines:           See the Anesthesia note for documentation of the     CURRENT_MEDS                      administered medications    FINDING Findings:    FINDING      Hemorrhoids were found on perianal exam.    FINDING      A moderate stenosis measuring 7 cm (in length) was found in the sigmoid     FINDING      colon and was traversed. Biopsies were taken with a cold forceps for     FINDING      histology.    FINDING      A large amount of stool was found in the sigmoid colon, in the     FINDING      descending colon, at the splenic flexure, in the transverse colon, at     FINDING      the hepatic flexure and in the ascending colon. Lavage of the area was     FINDING      performed using a large amount of tap water, resulting in incomplete     FINDING      clearance with continued poor visualization.    COMPLICComplications:       No immediate complications.    IMPRESS Impression:          - Preparation of the colon was poor.    IMPRESS                      - Hemorrhoids found on perianal exam.    IMPRESS                      - Stricture in the sigmoid colon. Biopsied.    IMPRESS                      - Stool in the sigmoid colon, in the descending colon,     IMPRESS                      at the splenic flexure, in the transverse colon, at the     IMPRESS                      hepatic flexure and in the ascending colon.    IMPRESS                      attempted breakiing stool/disimpaction with large volume     IMPRESS                      water flush and snare and bx forceps but stool balls are     IMPRESS                      solid andhard and couldnot break    IMPRESS                      distal stool ball is 5 cm in length and 4 cm in diameter     IMPRESS                      nad unlikely to pass through edematous narrowing    ENDORECOMMENDATION Recommendation:      - Repeat colonoscopy PRN for surveillance.    ENDORECOMMENDATION                      - Continue present medications.    ENDORECOMMENDATION                      - Await pathology results.    ENDORECOMMENDATION                      - Resume previous diet.    ENDORECOMMENDATION                    miralax 3-4 times a day    ENDORECOMMENDATION                      cont abx and see if stool softens enough to pass    ENDORECOMMENDATION                      FECAL DISIMPACTION WITH SCOPE UNDER ANESTHESIA    ENDORECOMMENDATION                  could not remove all stool and large fecalith above     ENDORECOMMENDATION                      narrowing remains    ENDORECOMMENDATION                      poor prep    CPT_CODES Procedure Code(s):   --- Professional ---    CPT_CODES                      09192, Colonoscopy, flexible; with biopsy, single or     CPT_CODES                      multiple    ICD_CODES Diagnosis Code(s):   --- Professional ---    ICD_CODES                      K64.9, Unspecified hemorrhoids    ICD_CODES                      K56.699, Other intestinal obstruction unspecified as to     ICD_CODES                      partial versus complete obstruction    ICD_CODES                      R10.32, Left lower quadrant pain    CODINGSTMT CPT copyright 2016 [employer]. All rights reserved.    CODINGSTMT The codes documented in this report are preliminary and upon  review may     CODINGSTMT be revised to meet current compliance requirements.    SIGNATURENAME Dagoberto Mi MD    SIGNATURENAME Dagoberto Mi MD    SIGNATUREDATE 12/12/2019 8:31:46 AM    NUMADDENDA Number of Addenda: 0    INITIATEDON Note Initiated On: 12/12/2019 7:05 AM    < end of copied text > Pt seen by GI yesterday for colonoscopy. Colonoscopy shows sigmoid stricture and 5cm hard firm ball of stool. Pt tolerating clear liquid diet  flatus or no bowel function currently    Vital Signs Last 24 Hrs  T(C): 36.8 (13 Dec 2019 05:15), Max: 36.8 (13 Dec 2019 05:15)  T(F): 98.2 (13 Dec 2019 05:15), Max: 98.2 (13 Dec 2019 05:15)  HR: 54 (13 Dec 2019 05:15) (54 - 70)  BP: 113/60 (13 Dec 2019 05:15) (113/60 - 127/74)  BP(mean): --  RR: 17 (13 Dec 2019 05:15) (16 - 17)  SpO2: 100% (13 Dec 2019 05:15) (97% - 100%)    Physical Exam:  Pt is AAOx3  General: Well developed, in no acute distress.   Chest: Lungs clear, no rales, no rhonchi, no wheezes.   Heart: RR, no murmurs, no rubs, no gallops.   Abdomen: Soft, mild LLQ tenderness, no masses, BS normal.    Back: Normal curvature, no tenderness.   Neuro: Physiological, no localizing findings.   Skin: Normal, no rashes, no lesions noted.   Extremities: Warm, well perfused, no edema, Pulses intact    < from: Colonoscopy (12.12.19 @ 07:05) >    REPORTHEADER [facility]    REPORTHEADER GI     PATIENTNAME Patient Name: Kait Quezada    EXAMDATE Procedure Date: 12/12/2019 7:05 AM    PATIENTACCOUNTNUM Account Number: [id number]    PATIENTDOB YOB: 1960    ADMITTYPE Admit Type: Inpatient    PATIENTROOM Room: [facility]    PATGENDER Gender: Female    ENDOPROCEDURENAME Procedure:           Colonoscopy    INDICATION Indications:         Abdominal pain in the left lower quadrant    ENDOPROCEDURETEXT Procedure:           Pre-Anesthesia Assessment:    ENDOPROCEDURETEXT                      - Prior to the procedure, a History and Physical was     ENDOPROCEDURETEXT                      performed, and patient medications and allergies were     ENDOPROCEDURETEXT         reviewed. The patient's tolerance of previous anesthesia     ENDOPROCEDURETEXT                      was also reviewed. The risks and benefits of the     ENDOPROCEDURETEXT                      procedure and the sedation options and risks were    ENDOPROCEDURETEXT                      discussed with the patient. All questions were answered,     ENDOPROCEDURETEXT                      and informed consent was obtained. Prior Anticoagulants:     ENDOPROCEDURETEXT                      The patient has taken no previous anticoagulant or     ENDOPROCEDURETEXT                      antiplatelet agents. ASA Grade Assessment: III - A     ENDOPROCEDURETEXT                      patient with severe systemic disease. After reviewing     ENDOPROCEDURETEXT                      the risks and benefits, the patient was deemed in     ENDOPROCEDURETEXT                      satisfactory condition to undergo the procedure.    ENDOPROCEDURETEXT                      After I obtained informed consent, the scope was passed     ENDOPROCEDURETEXT                      under direct vision. Throughout the procedure, the     ENDOPROCEDURETEXT                      patient's blood pressure, pulse, and oxygen saturations     ENDOPROCEDURETEXTwere monitored continuously. The Colonoscope was     ENDOPROCEDURETEXT                      introduced through the anus and advanced to EAGLE cecum,     ENDOPROCEDURETEXT                      identified by ileocecal valve. The colonoscopy was     ENDOPROCEDURETEXT                      performed with difficulty due to inadequate bowel prep.     ENDOPROCEDURETEXT                      The patient tolerated the procedure well. The quality of     ENDOPROCEDURETEXT                      the bowel preparation was poor. No anatomical landmarks     ENDOPROCEDURETEXT                      were photographed.    PRIMARYPROVIDER Providers:           Dagoberto Mi MD    CURRENT_MEDS Medicines:           See the Anesthesia note for documentation of the     CURRENT_MEDS                      administered medications    FINDING Findings:    FINDING      Hemorrhoids were found on perianal exam.    FINDING      A moderate stenosis measuring 7 cm (in length) was found in the sigmoid     FINDING      colon and was traversed. Biopsies were taken with a cold forceps for     FINDING      histology.    FINDING      A large amount of stool was found in the sigmoid colon, in the     FINDING      descending colon, at the splenic flexure, in the transverse colon, at     FINDING      the hepatic flexure and in the ascending colon. Lavage of the area was     FINDING      performed using a large amount of tap water, resulting in incomplete     FINDING      clearance with continued poor visualization.    COMPLICComplications:       No immediate complications.    IMPRESS Impression:          - Preparation of the colon was poor.    IMPRESS                      - Hemorrhoids found on perianal exam.    IMPRESS                      - Stricture in the sigmoid colon. Biopsied.    IMPRESS                      - Stool in the sigmoid colon, in the descending colon,     IMPRESS                      at the splenic flexure, in the transverse colon, at the     IMPRESS                      hepatic flexure and in the ascending colon.    IMPRESS                      attempted breakiing stool/disimpaction with large volume     IMPRESS                      water flush and snare and bx forceps but stool balls are     IMPRESS                      solid andhard and couldnot break    IMPRESS                      distal stool ball is 5 cm in length and 4 cm in diameter     IMPRESS                      nad unlikely to pass through edematous narrowing    ENDORECOMMENDATION Recommendation:      - Repeat colonoscopy PRN for surveillance.    ENDORECOMMENDATION                      - Continue present medications.    ENDORECOMMENDATION                      - Await pathology results.    ENDORECOMMENDATION                      - Resume previous diet.    ENDORECOMMENDATION                    miralax 3-4 times a day    ENDORECOMMENDATION                      cont abx and see if stool softens enough to pass    ENDORECOMMENDATION                      FECAL DISIMPACTION WITH SCOPE UNDER ANESTHESIA    ENDORECOMMENDATION                  could not remove all stool and large fecalith above     ENDORECOMMENDATION                      narrowing remains    ENDORECOMMENDATION                      poor prep    CPT_CODES Procedure Code(s):   --- Professional ---    CPT_CODES                      49234, Colonoscopy, flexible; with biopsy, single or     CPT_CODES                      multiple    ICD_CODES Diagnosis Code(s):   --- Professional ---    ICD_CODES                      K64.9, Unspecified hemorrhoids    ICD_CODES                      K56.699, Other intestinal obstruction unspecified as to     ICD_CODES                      partial versus complete obstruction    ICD_CODES                      R10.32, Left lower quadrant pain    CODINGSTMT CPT copyright 2016 American Medical Association. All rights reserved.    CODINGSTMT The codes documented in this report are preliminary and upon  review may     CODINGSTMT be revised to meet current compliance requirements.    SIGNATURENAME Dagoberto Mi MD    SIGNATURENAME Dagoberto Mi MD    SIGNATUREDATE 12/12/2019 8:31:46 AM    NUMADDENDA Number of Addenda: 0    INITIATEDON Note Initiated On: 12/12/2019 7:05 AM    < end of copied text >

## 2019-12-13 NOTE — CONSULT NOTE ADULT - SUBJECTIVE AND OBJECTIVE BOX
HPI:  SECOND OPINION CONSULT REQUESTED BY DR. AGUILERA  Pt is a 60 y/o female with a PMHx of back pain, Hashimoto's, Herniated nucleus pulposus, HTN, HLD presents to the ED c/o constipation and incr abd pain for the last week since taking Imodium for diarrhea which started the weekend after Thanksgiving.   Pt c/o intermittent diarrhea, subjective fever.     Denies urinary symptoms, n/v. Last colonoscopy 2 years ago resulting normal  ---------------------------------------------  CT showed extensive stool retention  Colonoscopy with large amount of retained stool in the colon, edema in the colon, possible narrowing as well     Allergies: Penicillin, IV contrast. No other complaints at this time.    Fam Hx:  Pt is adopted.  She reports her children are healthy (09 Dec 2019 15:39)      PAST MEDICAL & SURGICAL HISTORY:  Osteoporosis  Fracture: Lumbar 1  HLD (hyperlipidemia): diet controlled  Herniated nucleus pulposus, thoracic  Herniated nucleus pulposus, lumbar  HTN (hypertension)  Hashimoto's disease  Back pain  S/P insertion of spinal cord stimulator: 10/2017  S/P hip replacement, left: 2017  History of tonsillectomy  S/P arthroscopy of right knee: 7/2016 and done prior about 10 years ago  S/P cervical discectomy: 5/2013  S/P lumbar fusion: 7/2012 .3/ 2017  S/P hip replacement, right: 2012      Home Medications:  levothyroxine 100 mcg (0.1 mg) oral tablet: 1 tab(s) orally once a day (09 Dec 2019 18:20)  losartan 25 mg oral tablet: 1 tab(s) orally once a day (09 Dec 2019 18:20)  nabumetone 750 mg oral tablet: 1 tab(s) orally 2 times a day as needed  (09 Dec 2019 18:20)  traMADol 50 mg oral tablet: 1-2 tablets twice daily in the morning and afternoon (09 Dec 2019 18:20)  Vitamin D3 1000 intl units oral capsule: 1 cap(s) orally once a day (in the evening) (09 Dec 2019 18:20)      MEDICATIONS  (STANDING):  ciprofloxacin   IVPB 400 milliGRAM(s) IV Intermittent two times a day  fentaNYL   Patch  12 MICROgram(s)/Hr 1 Patch Transdermal every 72 hours  heparin  Injectable 5000 Unit(s) SubCutaneous every 12 hours  levothyroxine 100 MICROGram(s) Oral daily  losartan 25 milliGRAM(s) Oral daily  metroNIDAZOLE  IVPB 500 milliGRAM(s) IV Intermittent every 8 hours  mineral oil 30 milliLiter(s) Oral two times a day  mineral oil enema 133 milliLiter(s) Rectal two times a day  nystatin    Suspension 126210 Unit(s) Oral four times a day  polyethylene glycol 3350 17 Gram(s) Oral every 12 hours  traMADol 50 milliGRAM(s) Oral every 12 hours    MEDICATIONS  (PRN):  morphine  - Injectable 4 milliGRAM(s) IV Push every 4 hours PRN Moderate Pain (4 - 6)  ondansetron Injectable 8 milliGRAM(s) IV Push four times a day PRN nausea and 30 minutes prior to golytly      Allergies    adhesives (Rash; Blisters; Hives)  IV Contrast (Hives)  Lyrica (Other)  penicillin (Hives)    Intolerances        SOCIAL HISTORY:    FAMILY HISTORY:      ROS  As above  Otherwise unremarkable    Vital Signs Last 24 Hrs  T(C): 37 (13 Dec 2019 11:26), Max: 37 (13 Dec 2019 11:26)  T(F): 98.6 (13 Dec 2019 11:26), Max: 98.6 (13 Dec 2019 11:26)  HR: 54 (13 Dec 2019 11:26) (54 - 70)  BP: 133/75 (13 Dec 2019 11:26) (113/60 - 133/75)  BP(mean): --  RR: 16 (13 Dec 2019 11:26) (16 - 17)  SpO2: 97% (13 Dec 2019 11:26) (97% - 100%)    Constitutional: NAD, well-developed  Respiratory: CTAB  Cardiovascular: S1 and S2, RRR  Gastrointestinal: BS+, soft, NT/ND  Extremities: No peripheral edema  Psychiatric: Normal mood, normal affect  Skin: No rashes    LABS:                        10.2   6.46  )-----------( 289      ( 12 Dec 2019 12:03 )             30.1     12-12    139  |  106  |  1<L>  ----------------------------<  95  3.6   |  29  |  0.59    Ca    8.5      12 Dec 2019 12:03            RADIOLOGY & ADDITIONAL STUDIES:

## 2019-12-13 NOTE — DISCHARGE NOTE PROVIDER - HOSPITAL COURSE
60 y/o female with a PMHx of back pain, Hashimoto's, Herniated nucleus pulposus, HTN, HLD presented to the ED c/o constipation and incr abd pain for 1 week   since taking Imodium for diarrhea which started the weekend after Thanksgiving.      A/P    Patient was found to have     Acute  constipation    Stercoral colitis     Dehydration    Leukocytosis        PATIENT RECEIVED IV HYDRATION AND 4DAYS OF IV CIPRO AND FLAGYL    patient did not have bowel movement despite mineral oil enema, miralx , magnesium citrate, golyteley    so underwent colonoscopy 12/12 by GI - Found SIGMOID STRICTURE WITH LARGE 5 CM FECOLITH WITH DIFFICULT  INCOMPLETE COLONOSCOPIC DISIMPACTION WITH COLONIC NARROWING BELOW THE FECOLITH     DUE TO INCOMPLETE COLONOSCOPIC   DISIMPACTION , PATIENT WAS OFFERED surgical intervention VS attempting to dilate the area of stricturing after edema resolves.    PATIENT DECLINED SURGICAL OPTION FOR NOW  SO  AS PER GI , PLAN IS FOR   mineral oil from above, MiraLAX, AND 6 MORE DAYS OF CIPRO AND FLAGYL TO COMPLETE 10 DAY COURSE  AND  time to allow the edema to resolve and repeated dilations of the region. As dilations will require larger balloon size, DR AGUILERA REFFERED PATIENT TO to Dr. Sara Durant.PLAN IS FOR PATIENT TO BE DISCHARGED TODAY WITH OUTPATIENT F/U WITH DR SANGEETHA DURANT IN 2 TO3 DAYS     PATIENT WANTS TO GO HOME TODAY        GI AND SURG, ID/GYN  CONSULTS APPRECIATED                         Constitutional: NAD    HEENT: Atraumatic, APRIL, Normal, No congestion    Respiratory: Breath Sounds normal, no rhonchi/wheeze    Cardiovascular: N S1S2;     Gastrointestinal: Abdomen soft,MINIMALLLQTENDERNESS, no rigidity, NO REBOUND     Extremities: No edema, peripheral pulses present    Neurological: AAO x 3, no gross focal motor deficits        DISCHARGE TIME SPENT 47 MINS     I DISCUSSED WITH PATIENT AND GI DR AGUILERA AND RN TEAM 58 y/o female with a PMHx of back pain, Hashimoto's, Herniated nucleus pulposus, HTN, HLD presented to the ED c/o constipation and incr abd pain for 1 week   since taking Imodium for diarrhea which started the weekend after Thanksgiving.      A/P    Patient was found to have     Acute  constipation    Stercoral colitis     Dehydration    Leukocytosis        PATIENT RECEIVED IV HYDRATION AND 4DAYS OF IV CIPRO AND FLAGYL    patient did not have bowel movement despite mineral oil enema, miralx , magnesium citrate, golyteley    so underwent colonoscopy 12/12 by GI - Found SIGMOID STRICTURE WITH LARGE 5 CM FECOLITH WITH DIFFICULT  INCOMPLETE COLONOSCOPIC DISIMPACTION WITH COLONIC NARROWING BELOW THE FECOLITH     DUE TO INCOMPLETE COLONOSCOPIC   DISIMPACTION , PATIENT WAS OFFERED surgical intervention VS attempting to dilate the area of stricturing after edema resolves.    PATIENT DECLINED SURGICAL OPTION FOR NOW  SO  AS PER GI , PLAN IS FOR   mineral oil from above, MiraLAX, AND 6 MORE DAYS OF CIPRO AND FLAGYL TO COMPLETE 10 DAY COURSE  AND  time to allow the edema to resolve and repeated dilations of the region. As dilations will require larger balloon size, DR AGUILERA REFFERED PATIENT TO to Dr. Sara Durant.PLAN IS FOR PATIENT TO BE DISCHARGED TODAY WITH OUTPATIENT F/U WITH DR SANGEETHA DURANT IN 2 TO3 DAYS     PATIENT WANTS TO GO HOME TODAY    I explained  to patient if impaction does not pass then she may need surgery        GI AND SURG, ID/GYN  CONSULTS APPRECIATED                         Constitutional: NAD    HEENT: Atraumatic, APRIL, Normal, No congestion    Respiratory: Breath Sounds normal, no rhonchi/wheeze    Cardiovascular: N S1S2;     Gastrointestinal: Abdomen soft,MINIMALLLQTENDERNESS, no rigidity, NO REBOUND     Extremities: No edema, peripheral pulses present    Neurological: AAO x 3, no gross focal motor deficits        DISCHARGE TIME SPENT 47 MINS     I DISCUSSED WITH PATIENT AND GI DR AGUILERA AND RN TEAM

## 2019-12-13 NOTE — PROGRESS NOTE ADULT - ASSESSMENT
constipation associated with colitis.  Approximately a 7 cm segment of the sigmoid colon which is very edematous and swollen. This area was traversed with a colonoscope and initially was very tight however, appear to have become dilated more to the diameter of the colonoscope past it we traversed this region.  Large fecaliths noted above this area, largest one being probably about 5 x 3 cm and there was multiple other fecaliths above that area that were in the 3 cm range, fecalith was very firm, could not be broken apart with the water jet, biopsy forceps or snare.  I had an extensive discussion with the patient regarding options of therapy. Electrohydraulic lithotripsy, surgical intervention, attempting to dilate the area of stricturing after edema resolves and we have a better idea of what the underlying causes as well as if there is a stricture in that region    case discussed with surgery as well as primary care.  After an extensive discussion, patient is considering mineral oil from above, MiraLAX, time to allow the edema to resolve and repeated dilations of the region. As dilations will require larger balloon size, I have referred her to Dr. Sara Durant,  Continue laxatives from above as well as mineral oil. Furthermore, would continue antibiotics for about another 5 days.  2nd opinion from Dr. Wagner    case LOGAN Durant

## 2019-12-13 NOTE — PROGRESS NOTE ADULT - SUBJECTIVE AND OBJECTIVE BOX
Date of service: 12-13-19 @ 10:29    pt seen and examined  s/p colonoscopy noted with stricture in sigmoid colon/stool balls   less abd discomfort  still constipated  afebrile    ROS: no fever or chills; denies dizziness, no HA, no SOB or cough, no diarrhea,; no dysuria, no urinary frequency, no legs pain, no rashes      MEDICATIONS  (STANDING):  ciprofloxacin   IVPB 400 milliGRAM(s) IV Intermittent two times a day  fentaNYL   Patch  12 MICROgram(s)/Hr 1 Patch Transdermal every 72 hours  heparin  Injectable 5000 Unit(s) SubCutaneous every 12 hours  influenza   Vaccine 0.5 milliLiter(s) IntraMuscular once  levothyroxine 100 MICROGram(s) Oral daily  losartan 25 milliGRAM(s) Oral daily  metroNIDAZOLE  IVPB 500 milliGRAM(s) IV Intermittent every 8 hours  mineral oil 30 milliLiter(s) Oral two times a day  mineral oil enema 133 milliLiter(s) Rectal two times a day  nystatin    Suspension 561063 Unit(s) Oral four times a day  polyethylene glycol 3350 17 Gram(s) Oral every 12 hours  traMADol 50 milliGRAM(s) Oral every 12 hours      Vital Signs Last 24 Hrs  T(C): 36.8 (13 Dec 2019 05:15), Max: 36.8 (13 Dec 2019 05:15)  T(F): 98.2 (13 Dec 2019 05:15), Max: 98.2 (13 Dec 2019 05:15)  HR: 54 (13 Dec 2019 05:15) (54 - 70)  BP: 113/60 (13 Dec 2019 05:15) (113/60 - 127/74)  BP(mean): --  RR: 17 (13 Dec 2019 05:15) (16 - 17)  SpO2: 100% (13 Dec 2019 05:15) (97% - 100%)      PE:  Constitutional: frail looking  HEENT: NC/AT, EOMI, PERRLA, conjunctivae clear; ears and nose atraumatic; pharynx benign  Neck: supple; thyroid not palpable  Back: no tenderness  Respiratory: respiratory effort normal; clear to auscultation  Cardiovascular: S1S2 regular, no murmurs  Abdomen: soft, not tender, distended, positive BS; liver and spleen WNL  Genitourinary: no suprapubic tenderness  Lymphatic: no LN palpable  Musculoskeletal: no muscle tenderness, no joint swelling or tenderness  Extremities: no pedal edema  Neurological/ Psychiatric:  moving all extremities  Skin: no rashes; no palpable lesions    Labs: all available labs reviewed                                   10.2   6.46  )-----------( 289      ( 12 Dec 2019 12:03 )             30.1     12-12    139  |  106  |  1<L>  ----------------------------<  95  3.6   |  29  |  0.59    Ca    8.5      12 Dec 2019 12:03             Culture - Urine (12.09.19 @ 15:42)    Specimen Source: .Urine None    Culture Results:   <10,000 CFU/mL Normal Urogenital Tiffani    Culture - Blood (12.09.19 @ 10:56)    Specimen Source: .Blood None    Culture Results:   No growth to date.        Radiology: all available radiological tests reviewed    EXAM:  CT ABDOMEN AND PELVIS OC                            PROCEDURE DATE:  12/09/2019          INTERPRETATION:  CLINICAL INFORMATION: Abdominal pain    COMPARISON: None.    PROCEDURE:   CT of the Abdomen and Pelvis was performed without intravenous contrast.   Intravenous contrast: None.  Oral contrast: positive contrast was administered.  Sagittal and coronal reformats were performed.    FINDINGS:    LOWER CHEST: Coronary artery calcifications. Clear lung bases.    LIVER: Within normal limits.  BILE DUCTS: Normal caliber.  GALLBLADDER: Within normal limits.  SPLEEN: Within normal limits.  PANCREAS: Within normal limits.  ADRENALS: Within normal limits.  KIDNEYS/URETERS: Within normal limits.    PELVIS: Evaluation of the lower pelvis is limited due to beam hardening   artifact from the patient's bilateral hip arthroplasties. The bladder,   uterus and adnexa are within normal limits.    BOWEL: Large amount of stool seen throughout the colon with wall   thickening of the descending and sigmoid colon. No extraluminal gas.   Normal appendix.    PERITONEUM: No ascites.  VESSELS: Within normal limits.  RETROPERITONEUM/LYMPH NODES: No lymphadenopathy.    ABDOMINAL WALL: Right buttock spinal stimulator.  BONES: Bilateral hip arthroplasties. Moderate L1 compression deformity,   status post vertebroplasty. Orthopedic hardware in the L3-L5 vertebral   bodies with L3-L4 disc spacer. No acute abnormality.      IMPRESSION:     Stercoral colitis involving the descending and sigmoid colon.  Marked amount of stool throughout the colon.      Advanced directives addressed: full resuscitation

## 2019-12-13 NOTE — PROGRESS NOTE ADULT - ATTENDING COMMENTS
Pt seen     Agree with above  Will require colonoscopy
Pt seen     Agree with above  Will require colonoscopy
Discussed with pt findings  Discussed surgical intervention and probable need for colostomy if she cannot be cleared of hard stool. Risks include poss dehiscence and infection.  Pt will obtain other opinions and understands risks and benefits of surgery and no surgery

## 2019-12-13 NOTE — PROGRESS NOTE ADULT - ASSESSMENT
58 yo female with stercoral colitis    Plan:    -Fluid resuscitation and bowel rest  -No surgical intervention at this time  -f/u GI recs  -Stool softeners and enemas  -Monitor bowel function  -Pain control with non-narcotics  -If no resolution of symptoms over weekend, will have to consider surgical interventions, however patient strongly against surgery unless absolutely needed    Discussed with Dr. Brunner

## 2019-12-14 LAB
CULTURE RESULTS: SIGNIFICANT CHANGE UP
CULTURE RESULTS: SIGNIFICANT CHANGE UP
SPECIMEN SOURCE: SIGNIFICANT CHANGE UP
SPECIMEN SOURCE: SIGNIFICANT CHANGE UP

## 2019-12-15 ENCOUNTER — INPATIENT (INPATIENT)
Facility: HOSPITAL | Age: 59
LOS: 3 days | Discharge: HOME | DRG: 388 | End: 2019-12-19
Attending: FAMILY MEDICINE | Admitting: FAMILY MEDICINE
Payer: COMMERCIAL

## 2019-12-15 VITALS
OXYGEN SATURATION: 100 % | DIASTOLIC BLOOD PRESSURE: 95 MMHG | HEART RATE: 94 BPM | SYSTOLIC BLOOD PRESSURE: 134 MMHG | TEMPERATURE: 98 F | RESPIRATION RATE: 18 BRPM

## 2019-12-15 DIAGNOSIS — Z96.642 PRESENCE OF LEFT ARTIFICIAL HIP JOINT: Chronic | ICD-10-CM

## 2019-12-15 DIAGNOSIS — Z98.890 OTHER SPECIFIED POSTPROCEDURAL STATES: Chronic | ICD-10-CM

## 2019-12-15 DIAGNOSIS — Z90.89 ACQUIRED ABSENCE OF OTHER ORGANS: Chronic | ICD-10-CM

## 2019-12-15 DIAGNOSIS — K52.9 NONINFECTIVE GASTROENTERITIS AND COLITIS, UNSPECIFIED: ICD-10-CM

## 2019-12-15 DIAGNOSIS — Z98.1 ARTHRODESIS STATUS: Chronic | ICD-10-CM

## 2019-12-15 DIAGNOSIS — Z96.641 PRESENCE OF RIGHT ARTIFICIAL HIP JOINT: Chronic | ICD-10-CM

## 2019-12-15 LAB
ALBUMIN SERPL ELPH-MCNC: 4.5 G/DL — SIGNIFICANT CHANGE UP (ref 3.3–5)
ALP SERPL-CCNC: 83 U/L — SIGNIFICANT CHANGE UP (ref 40–120)
ALT FLD-CCNC: 144 U/L — HIGH (ref 12–78)
ANION GAP SERPL CALC-SCNC: 12 MMOL/L — SIGNIFICANT CHANGE UP (ref 5–17)
APPEARANCE UR: CLEAR — SIGNIFICANT CHANGE UP
APTT BLD: 31.9 SEC — SIGNIFICANT CHANGE UP (ref 27.5–36.3)
AST SERPL-CCNC: 160 U/L — HIGH (ref 15–37)
BASOPHILS # BLD AUTO: 0.06 K/UL — SIGNIFICANT CHANGE UP (ref 0–0.2)
BASOPHILS NFR BLD AUTO: 0.5 % — SIGNIFICANT CHANGE UP (ref 0–2)
BILIRUB SERPL-MCNC: 0.3 MG/DL — SIGNIFICANT CHANGE UP (ref 0.2–1.2)
BILIRUB UR-MCNC: NEGATIVE — SIGNIFICANT CHANGE UP
BUN SERPL-MCNC: 9 MG/DL — SIGNIFICANT CHANGE UP (ref 7–23)
CALCIUM SERPL-MCNC: 9.6 MG/DL — SIGNIFICANT CHANGE UP (ref 8.5–10.1)
CHLORIDE SERPL-SCNC: 101 MMOL/L — SIGNIFICANT CHANGE UP (ref 96–108)
CO2 SERPL-SCNC: 25 MMOL/L — SIGNIFICANT CHANGE UP (ref 22–31)
COLOR SPEC: YELLOW — SIGNIFICANT CHANGE UP
CREAT SERPL-MCNC: 0.78 MG/DL — SIGNIFICANT CHANGE UP (ref 0.5–1.3)
DIFF PNL FLD: NEGATIVE — SIGNIFICANT CHANGE UP
EOSINOPHIL # BLD AUTO: 0.04 K/UL — SIGNIFICANT CHANGE UP (ref 0–0.5)
EOSINOPHIL NFR BLD AUTO: 0.4 % — SIGNIFICANT CHANGE UP (ref 0–6)
GLUCOSE SERPL-MCNC: 112 MG/DL — HIGH (ref 70–99)
GLUCOSE UR QL: NEGATIVE MG/DL — SIGNIFICANT CHANGE UP
HCG SERPL-ACNC: 2 MIU/ML — SIGNIFICANT CHANGE UP
HCT VFR BLD CALC: 44.6 % — SIGNIFICANT CHANGE UP (ref 34.5–45)
HGB BLD-MCNC: 15.2 G/DL — SIGNIFICANT CHANGE UP (ref 11.5–15.5)
IMM GRANULOCYTES NFR BLD AUTO: 0.4 % — SIGNIFICANT CHANGE UP (ref 0–1.5)
INR BLD: 1.19 RATIO — HIGH (ref 0.88–1.16)
KETONES UR-MCNC: ABNORMAL
LACTATE SERPL-SCNC: 2.1 MMOL/L — HIGH (ref 0.7–2)
LEUKOCYTE ESTERASE UR-ACNC: ABNORMAL
LIDOCAIN IGE QN: 37 U/L — LOW (ref 73–393)
LYMPHOCYTES # BLD AUTO: 1.3 K/UL — SIGNIFICANT CHANGE UP (ref 1–3.3)
LYMPHOCYTES # BLD AUTO: 11.6 % — LOW (ref 13–44)
MCHC RBC-ENTMCNC: 31.1 PG — SIGNIFICANT CHANGE UP (ref 27–34)
MCHC RBC-ENTMCNC: 34.1 GM/DL — SIGNIFICANT CHANGE UP (ref 32–36)
MCV RBC AUTO: 91.4 FL — SIGNIFICANT CHANGE UP (ref 80–100)
MONOCYTES # BLD AUTO: 0.74 K/UL — SIGNIFICANT CHANGE UP (ref 0–0.9)
MONOCYTES NFR BLD AUTO: 6.6 % — SIGNIFICANT CHANGE UP (ref 2–14)
NEUTROPHILS # BLD AUTO: 8.97 K/UL — HIGH (ref 1.8–7.4)
NEUTROPHILS NFR BLD AUTO: 80.5 % — HIGH (ref 43–77)
NITRITE UR-MCNC: NEGATIVE — SIGNIFICANT CHANGE UP
PH UR: 5 — SIGNIFICANT CHANGE UP (ref 5–8)
PLATELET # BLD AUTO: 537 K/UL — HIGH (ref 150–400)
POTASSIUM SERPL-MCNC: 3.7 MMOL/L — SIGNIFICANT CHANGE UP (ref 3.5–5.3)
POTASSIUM SERPL-SCNC: 3.7 MMOL/L — SIGNIFICANT CHANGE UP (ref 3.5–5.3)
PROT SERPL-MCNC: 8.8 GM/DL — HIGH (ref 6–8.3)
PROT UR-MCNC: 15 MG/DL
PROTHROM AB SERPL-ACNC: 13.3 SEC — HIGH (ref 10–12.9)
RBC # BLD: 4.88 M/UL — SIGNIFICANT CHANGE UP (ref 3.8–5.2)
RBC # FLD: 12.4 % — SIGNIFICANT CHANGE UP (ref 10.3–14.5)
SODIUM SERPL-SCNC: 138 MMOL/L — SIGNIFICANT CHANGE UP (ref 135–145)
SP GR SPEC: 1.01 — SIGNIFICANT CHANGE UP (ref 1.01–1.02)
UROBILINOGEN FLD QL: NEGATIVE MG/DL — SIGNIFICANT CHANGE UP
WBC # BLD: 11.16 K/UL — HIGH (ref 3.8–10.5)
WBC # FLD AUTO: 11.16 K/UL — HIGH (ref 3.8–10.5)

## 2019-12-15 PROCEDURE — 36415 COLL VENOUS BLD VENIPUNCTURE: CPT

## 2019-12-15 PROCEDURE — 74176 CT ABD & PELVIS W/O CONTRAST: CPT | Mod: 26

## 2019-12-15 PROCEDURE — 71046 X-RAY EXAM CHEST 2 VIEWS: CPT | Mod: 26

## 2019-12-15 PROCEDURE — 80053 COMPREHEN METABOLIC PANEL: CPT

## 2019-12-15 PROCEDURE — 87086 URINE CULTURE/COLONY COUNT: CPT

## 2019-12-15 PROCEDURE — 83735 ASSAY OF MAGNESIUM: CPT

## 2019-12-15 PROCEDURE — 81001 URINALYSIS AUTO W/SCOPE: CPT

## 2019-12-15 PROCEDURE — 85610 PROTHROMBIN TIME: CPT

## 2019-12-15 PROCEDURE — 85025 COMPLETE CBC W/AUTO DIFF WBC: CPT

## 2019-12-15 PROCEDURE — 80048 BASIC METABOLIC PNL TOTAL CA: CPT

## 2019-12-15 PROCEDURE — 85027 COMPLETE CBC AUTOMATED: CPT

## 2019-12-15 PROCEDURE — 84100 ASSAY OF PHOSPHORUS: CPT

## 2019-12-15 RX ORDER — MORPHINE SULFATE 50 MG/1
4 CAPSULE, EXTENDED RELEASE ORAL ONCE
Refills: 0 | Status: DISCONTINUED | OUTPATIENT
Start: 2019-12-15 | End: 2019-12-15

## 2019-12-15 RX ORDER — MORPHINE SULFATE 50 MG/1
4 CAPSULE, EXTENDED RELEASE ORAL EVERY 6 HOURS
Refills: 0 | Status: DISCONTINUED | OUTPATIENT
Start: 2019-12-15 | End: 2019-12-16

## 2019-12-15 RX ORDER — TRAMADOL HYDROCHLORIDE 50 MG/1
50 TABLET ORAL EVERY 4 HOURS
Refills: 0 | Status: DISCONTINUED | OUTPATIENT
Start: 2019-12-15 | End: 2019-12-15

## 2019-12-15 RX ORDER — ENOXAPARIN SODIUM 100 MG/ML
40 INJECTION SUBCUTANEOUS DAILY
Refills: 0 | Status: DISCONTINUED | OUTPATIENT
Start: 2019-12-15 | End: 2019-12-19

## 2019-12-15 RX ORDER — NYSTATIN 500MM UNIT
400000 POWDER (EA) MISCELLANEOUS
Refills: 0 | Status: DISCONTINUED | OUTPATIENT
Start: 2019-12-15 | End: 2019-12-19

## 2019-12-15 RX ORDER — MORPHINE SULFATE 50 MG/1
2 CAPSULE, EXTENDED RELEASE ORAL EVERY 4 HOURS
Refills: 0 | Status: DISCONTINUED | OUTPATIENT
Start: 2019-12-15 | End: 2019-12-15

## 2019-12-15 RX ORDER — METRONIDAZOLE 500 MG
500 TABLET ORAL THREE TIMES A DAY
Refills: 0 | Status: DISCONTINUED | OUTPATIENT
Start: 2019-12-15 | End: 2019-12-16

## 2019-12-15 RX ORDER — SODIUM CHLORIDE 9 MG/ML
1000 INJECTION INTRAMUSCULAR; INTRAVENOUS; SUBCUTANEOUS
Refills: 0 | Status: DISCONTINUED | OUTPATIENT
Start: 2019-12-15 | End: 2019-12-16

## 2019-12-15 RX ORDER — ZOLPIDEM TARTRATE 10 MG/1
5 TABLET ORAL AT BEDTIME
Refills: 0 | Status: DISCONTINUED | OUTPATIENT
Start: 2019-12-15 | End: 2019-12-19

## 2019-12-15 RX ORDER — SODIUM CHLORIDE 9 MG/ML
1000 INJECTION INTRAMUSCULAR; INTRAVENOUS; SUBCUTANEOUS ONCE
Refills: 0 | Status: COMPLETED | OUTPATIENT
Start: 2019-12-15 | End: 2019-12-15

## 2019-12-15 RX ORDER — PANTOPRAZOLE SODIUM 20 MG/1
40 TABLET, DELAYED RELEASE ORAL
Refills: 0 | Status: DISCONTINUED | OUTPATIENT
Start: 2019-12-15 | End: 2019-12-19

## 2019-12-15 RX ORDER — LEVOTHYROXINE SODIUM 125 MCG
100 TABLET ORAL DAILY
Refills: 0 | Status: DISCONTINUED | OUTPATIENT
Start: 2019-12-15 | End: 2019-12-19

## 2019-12-15 RX ORDER — ACETAMINOPHEN 500 MG
975 TABLET ORAL ONCE
Refills: 0 | Status: COMPLETED | OUTPATIENT
Start: 2019-12-15 | End: 2019-12-15

## 2019-12-15 RX ORDER — MINERAL OIL
30 OIL (ML) MISCELLANEOUS
Refills: 0 | Status: DISCONTINUED | OUTPATIENT
Start: 2019-12-15 | End: 2019-12-19

## 2019-12-15 RX ORDER — LOSARTAN POTASSIUM 100 MG/1
25 TABLET, FILM COATED ORAL DAILY
Refills: 0 | Status: DISCONTINUED | OUTPATIENT
Start: 2019-12-15 | End: 2019-12-19

## 2019-12-15 RX ORDER — ONDANSETRON 8 MG/1
4 TABLET, FILM COATED ORAL EVERY 6 HOURS
Refills: 0 | Status: DISCONTINUED | OUTPATIENT
Start: 2019-12-15 | End: 2019-12-19

## 2019-12-15 RX ORDER — CHOLECALCIFEROL (VITAMIN D3) 125 MCG
1000 CAPSULE ORAL DAILY
Refills: 0 | Status: DISCONTINUED | OUTPATIENT
Start: 2019-12-15 | End: 2019-12-19

## 2019-12-15 RX ORDER — TRAMADOL HYDROCHLORIDE 50 MG/1
50 TABLET ORAL EVERY 6 HOURS
Refills: 0 | Status: DISCONTINUED | OUTPATIENT
Start: 2019-12-15 | End: 2019-12-16

## 2019-12-15 RX ORDER — CIPROFLOXACIN LACTATE 400MG/40ML
500 VIAL (ML) INTRAVENOUS EVERY 12 HOURS
Refills: 0 | Status: DISCONTINUED | OUTPATIENT
Start: 2019-12-15 | End: 2019-12-16

## 2019-12-15 RX ORDER — POLYETHYLENE GLYCOL 3350 17 G/17G
17 POWDER, FOR SOLUTION ORAL EVERY 12 HOURS
Refills: 0 | Status: DISCONTINUED | OUTPATIENT
Start: 2019-12-15 | End: 2019-12-19

## 2019-12-15 RX ADMIN — SODIUM CHLORIDE 1000 MILLILITER(S): 9 INJECTION INTRAMUSCULAR; INTRAVENOUS; SUBCUTANEOUS at 16:04

## 2019-12-15 RX ADMIN — ZOLPIDEM TARTRATE 5 MILLIGRAM(S): 10 TABLET ORAL at 21:57

## 2019-12-15 RX ADMIN — Medication 500 MILLIGRAM(S): at 21:20

## 2019-12-15 RX ADMIN — MORPHINE SULFATE 4 MILLIGRAM(S): 50 CAPSULE, EXTENDED RELEASE ORAL at 22:26

## 2019-12-15 RX ADMIN — Medication 975 MILLIGRAM(S): at 13:27

## 2019-12-15 RX ADMIN — MORPHINE SULFATE 4 MILLIGRAM(S): 50 CAPSULE, EXTENDED RELEASE ORAL at 16:04

## 2019-12-15 RX ADMIN — SODIUM CHLORIDE 1000 MILLILITER(S): 9 INJECTION INTRAMUSCULAR; INTRAVENOUS; SUBCUTANEOUS at 13:25

## 2019-12-15 RX ADMIN — MORPHINE SULFATE 4 MILLIGRAM(S): 50 CAPSULE, EXTENDED RELEASE ORAL at 13:27

## 2019-12-15 RX ADMIN — MORPHINE SULFATE 4 MILLIGRAM(S): 50 CAPSULE, EXTENDED RELEASE ORAL at 21:56

## 2019-12-15 RX ADMIN — MORPHINE SULFATE 2 MILLIGRAM(S): 50 CAPSULE, EXTENDED RELEASE ORAL at 20:20

## 2019-12-15 RX ADMIN — MORPHINE SULFATE 2 MILLIGRAM(S): 50 CAPSULE, EXTENDED RELEASE ORAL at 19:50

## 2019-12-15 RX ADMIN — MORPHINE SULFATE 4 MILLIGRAM(S): 50 CAPSULE, EXTENDED RELEASE ORAL at 16:14

## 2019-12-15 RX ADMIN — Medication 975 MILLIGRAM(S): at 16:04

## 2019-12-15 NOTE — H&P ADULT - HISTORY OF PRESENT ILLNESS
59 year old female patient previously discharged after inpatient admission for stercoral colitis presented back to the ED complaining of severe abdominal pain associated with nausea, anorexia and constipation. Patient states she has not had a bowel movement since given the bowel prep for her last colonoscopy on 12/12/19. Patient describes a diffuse, sharp abdominal pain with no clear aggravating or alleviating factors. Patient was not able to see GI Specialist(Dr Durant) for possible bowel dilation procedure. Patient still taking cipro/flagyl. Denies any fever, chills, vomiting, chest discomfort, bloody bowel movements or diarrhea. Patient still passing flatus.      In the ED patient had a repeat CT abdomen, which revealed Persistent stercoral colitis involving the sigmoid colon. Large amount of   stool throughout the colon, mildly decreased compared with December 09, 2019.

## 2019-12-15 NOTE — ED ADULT TRIAGE NOTE - CHIEF COMPLAINT QUOTE
Patient comes in after recent admission for bowel obstruction. Patient was told to come back to ED with increased pain. Patient states she has had severe abdominal pain x 3 hours.

## 2019-12-15 NOTE — ED ADULT NURSE NOTE - OBJECTIVE STATEMENT
pt is a 58 y/o female presenting to ED for eval of colitis presenting for worsening pain since last week states she was seen last week and was told she needed surgery but declined it. pt denies fever, chills, dizziness or palpitations.

## 2019-12-15 NOTE — ED STATDOCS - PROGRESS NOTE DETAILS
D/w Dr. Brunner, states he will consult if admitted to medicine. Labs and CT pending at this time. - Georges Valenzuela PA-C 58 y/o F with PMH pf Hashimoto's dx, HTN, HLD presents with abdominal pain. Pt was recently admitted with diagnosis of stercoral colitis. Evaluated by surgery after medical admission. Patient refused surgery. Was advised to return to ED if symptoms worsen. pt states while admitted, symptoms improved and she was discharged. Notes this morning symptoms worsened significantly, worse than prior to previous admission. States she would like to have surgery for management of symptoms. Denies fever, chills, nausea, vomiting, dysuria, hematuria. Patient received morphine prior to exam, notes improvement in pain. PE: Well appearing. Cardiac: s1s2, RRR. Lungs: CTAB. Abdomen: Mild distension. hypoactive bowel sounds. +LLQ tenderness. no CVAT. A/P: Stercoral colitis. Plan for IVF, labs, CTAP, admission. - Georges Valenzuela PA-C Labs and imaging reviewed, plan for admission. - Georges Valenzuela PA-C

## 2019-12-15 NOTE — H&P ADULT - NSHPSOCIALHISTORY_GEN_ALL_CORE
Topical Sulfur Applications Counseling: Topical Sulfur Counseling: Patient counseled that this medication may cause skin irritation or allergic reactions.  In the event of skin irritation, the patient was advised to reduce the amount of the drug applied or use it less frequently.   The patient verbalized understanding of the proper use and possible adverse effects of topical sulfur application.  All of the patient's questions and concerns were addressed. High Dose Vitamin A Pregnancy And Lactation Text: High dose vitamin A therapy is contraindicated during pregnancy and breast feeding. Birth Control Pills Counseling: Birth Control Pill Counseling: I discussed with the patient the potential side effects of OCPs including but not limited to increased risk of stroke, heart attack, thrombophlebitis, deep venous thrombosis, hepatic adenomas, breast changes, GI upset, headaches, and depression.  The patient verbalized understanding of the proper use and possible adverse effects of OCPs. All of the patient's questions and concerns were addressed. Benzoyl Peroxide Pregnancy And Lactation Text: This medication is Pregnancy Category C. It is unknown if benzoyl peroxide is excreted in breast milk. Topical Retinoid counseling:  Patient advised to apply a pea-sized amount only at bedtime and wait 30 minutes after washing their face before applying.  If too drying, patient may add a non-comedogenic moisturizer. The patient verbalized understanding of the proper use and possible adverse effects of retinoids.  All of the patient's questions and concerns were addressed. Benzoyl Peroxide Counseling: Patient counseled that medicine may cause skin irritation and bleach clothing.  In the event of skin irritation, the patient was advised to reduce the amount of the drug applied or use it less frequently.   The patient verbalized understanding of the proper use and possible adverse effects of benzoyl peroxide.  All of the patient's questions and concerns were addressed. Dapsone Counseling: I discussed with the patient the risks of dapsone including but not limited to hemolytic anemia, agranulocytosis, rashes, methemoglobinemia, kidney failure, peripheral neuropathy, headaches, GI upset, and liver toxicity.  Patients who start dapsone require monitoring including baseline LFTs and weekly CBCs for the first month, then every month thereafter.  The patient verbalized understanding of the proper use and possible adverse effects of dapsone.  All of the patient's questions and concerns were addressed. Erythromycin Pregnancy And Lactation Text: This medication is Pregnancy Category B and is considered safe during pregnancy. It is also excreted in breast milk. High Dose Vitamin A Counseling: Side effects reviewed, pt to contact office should one occur. Tazorac Counseling:  Patient advised that medication is irritating and drying.  Patient may need to apply sparingly and wash off after an hour before eventually leaving it on overnight.  The patient verbalized understanding of the proper use and possible adverse effects of tazorac.  All of the patient's questions and concerns were addressed. Dapsone Pregnancy And Lactation Text: This medication is Pregnancy Category C and is not considered safe during pregnancy or breast feeding. Include Pregnancy/Lactation Warning?: No denies smoking, alcohol or drug use Minocycline Counseling: Patient advised regarding possible photosensitivity and discoloration of the teeth, skin, lips, tongue and gums.  Patient instructed to avoid sunlight, if possible.  When exposed to sunlight, patients should wear protective clothing, sunglasses, and sunscreen.  The patient was instructed to call the office immediately if the following severe adverse effects occur:  hearing changes, easy bruising/bleeding, severe headache, or vision changes.  The patient verbalized understanding of the proper use and possible adverse effects of minocycline.  All of the patient's questions and concerns were addressed. Isotretinoin Pregnancy And Lactation Text: This medication is Pregnancy Category X and is considered extremely dangerous during pregnancy. It is unknown if it is excreted in breast milk. Doxycycline Pregnancy And Lactation Text: This medication is Pregnancy Category D and not consider safe during pregnancy. It is also excreted in breast milk but is considered safe for shorter treatment courses. Birth Control Pills Pregnancy And Lactation Text: This medication should be avoided if pregnant and for the first 30 days post-partum. Doxycycline Counseling:  Patient counseled regarding possible photosensitivity and increased risk for sunburn.  Patient instructed to avoid sunlight, if possible.  When exposed to sunlight, patients should wear protective clothing, sunglasses, and sunscreen.  The patient was instructed to call the office immediately if the following severe adverse effects occur:  hearing changes, easy bruising/bleeding, severe headache, or vision changes.  The patient verbalized understanding of the proper use and possible adverse effects of doxycycline.  All of the patient's questions and concerns were addressed. Minocycline Pregnancy And Lactation Text: This medication is Pregnancy Category D and not consider safe during pregnancy. It is also excreted in breast milk. Topical Retinoid Pregnancy And Lactation Text: This medication is Pregnancy Category C. It is unknown if this medication is excreted in breast milk. Azithromycin Counseling:  I discussed with the patient the risks of azithromycin including but not limited to GI upset, allergic reaction, drug rash, diarrhea, and yeast infections. Detail Level: Zone Topical Sulfur Applications Pregnancy And Lactation Text: This medication is Pregnancy Category C and has an unknown safety profile during pregnancy. It is unknown if this topical medication is excreted in breast milk. Topical Clindamycin Counseling: Patient counseled that this medication may cause skin irritation or allergic reactions.  In the event of skin irritation, the patient was advised to reduce the amount of the drug applied or use it less frequently.   The patient verbalized understanding of the proper use and possible adverse effects of clindamycin.  All of the patient's questions and concerns were addressed. Bactrim Pregnancy And Lactation Text: This medication is Pregnancy Category D and is known to cause fetal risk.  It is also excreted in breast milk. Spironolactone Counseling: Patient advised regarding risks of diarrhea, abdominal pain, hyperkalemia, birth defects (for female patients), liver toxicity and renal toxicity. The patient may need blood work to monitor liver and kidney function and potassium levels while on therapy. The patient verbalized understanding of the proper use and possible adverse effects of spironolactone.  All of the patient's questions and concerns were addressed. Tazorac Pregnancy And Lactation Text: This medication is not safe during pregnancy. It is unknown if this medication is excreted in breast milk. Topical Clindamycin Pregnancy And Lactation Text: This medication is Pregnancy Category B and is considered safe during pregnancy. It is unknown if it is excreted in breast milk. Tetracycline Counseling: Patient counseled regarding possible photosensitivity and increased risk for sunburn.  Patient instructed to avoid sunlight, if possible.  When exposed to sunlight, patients should wear protective clothing, sunglasses, and sunscreen.  The patient was instructed to call the office immediately if the following severe adverse effects occur:  hearing changes, easy bruising/bleeding, severe headache, or vision changes.  The patient verbalized understanding of the proper use and possible adverse effects of tetracycline.  All of the patient's questions and concerns were addressed. Patient understands to avoid pregnancy while on therapy due to potential birth defects. Erythromycin Counseling:  I discussed with the patient the risks of erythromycin including but not limited to GI upset, allergic reaction, drug rash, diarrhea, increase in liver enzymes, and yeast infections. Spironolactone Pregnancy And Lactation Text: This medication can cause feminization of the male fetus and should be avoided during pregnancy. The active metabolite is also found in breast milk. Bactrim Counseling:  I discussed with the patient the risks of sulfa antibiotics including but not limited to GI upset, allergic reaction, drug rash, diarrhea, dizziness, photosensitivity, and yeast infections.  Rarely, more serious reactions can occur including but not limited to aplastic anemia, agranulocytosis, methemoglobinemia, blood dyscrasias, liver or kidney failure, lung infiltrates or desquamative/blistering drug rashes. Azithromycin Pregnancy And Lactation Text: This medication is considered safe during pregnancy and is also secreted in breast milk. Isotretinoin Counseling: Patient should get monthly blood tests, not donate blood, not drive at night if vision affected, not share medication, and not undergo elective surgery for 6 months after tx completed. Side effects reviewed, pt to contact office should one occur.

## 2019-12-15 NOTE — PATIENT PROFILE ADULT - HOW PATIENT ADDRESSED, PROFILE
nomi
Emergency Department Focused Ultrasound performed at patient's bedside for educational purposes. The study will have a follow up study performed or was performed in the direct supervision of an ultrasound trained attending.

## 2019-12-15 NOTE — ED ADULT NURSE NOTE - CHPI ED NUR SYMPTOMS NEG
no vomiting/no diarrhea/no fever/no abdominal distension/no blood in stool/no chills/no dysuria/no hematuria/no burning urination

## 2019-12-15 NOTE — H&P ADULT - NSHPPHYSICALEXAM_GEN_ALL_CORE
Vital Signs Last 24 Hrs  T(C): 36.8 (15 Dec 2019 17:40), Max: 36.8 (15 Dec 2019 17:40)  T(F): 98.3 (15 Dec 2019 17:40), Max: 98.3 (15 Dec 2019 17:40)  HR: 69 (15 Dec 2019 17:40) (69 - 94)  BP: 126/76 (15 Dec 2019 17:40) (110/83 - 134/95)  BP(mean): --  RR: 17 (15 Dec 2019 17:40) (17 - 18)  SpO2: 99% (15 Dec 2019 17:40) (99% - 100%)

## 2019-12-15 NOTE — ED STATDOCS - ATTENDING CONTRIBUTION TO CARE
I, Josse Bradley MD,  performed the initial face to face bedside interview with this patient regarding history of present illness, review of symptoms and relevant past medical, social and family history.  I completed an independent physical examination.  I was the initial provider who evaluated this patient. I have signed out the follow up of any pending tests (i.e. labs, radiological studies) to the ACP.  I have communicated the patient’s plan of care and disposition with the ACP.

## 2019-12-15 NOTE — H&P ADULT - ASSESSMENT
59 year old female patient with severe abdominal pain likely secondary to stercoral colitis      -Admit to [facility]    #Stercoral colitis  -NPO until surgery evaluation  -Pt in favor of operative management of colitis  -type and cross on file  -IVF hydration  -morphine for pain control  -on bowel regimen  -General surgery consult    #Leukocytosis  -no focal signs of infection  -likely reactive to the above process  -trend cbc    #HTN  - on losartan    #Hashimoto's thyroiditis  -on synthroid    DVT ppx  -ambulation

## 2019-12-15 NOTE — PATIENT PROFILE ADULT - NSPROGENDIFFINTUB_GEN_A_NUR
Reason for call:  Medication   If this is a refill request, has the caller requested the refill from the pharmacy already? No  Will the patient be using a [facility]? No  Name of the pharmacy and phone number for the current request: Mailed to [facility]    Name of the medication requested: oxyCODONE (ROXICODONE) 5 MG/5ML solution    Other request: Rose would like to  on 12/19    Phone number to reach patient:  Home number on file [phone] (home)    Best Time:  n/a    Can we leave a detailed message on this number?  YES       Wade MIGUEL    [facility]       previously intubated - no problems

## 2019-12-15 NOTE — ED STATDOCS - NS ED ROS FT
Review of Systems:  	•	CONSTITUTIONAL: no fever  	•	SKIN: no rash  	•	RESPIRATORY: no shortness of breath  	•	CARDIAC: no chest pain, no palpitations  	•	GI:  no nausea, no vomiting, no diarrhea +abd pain/distension  	•	GENITO-URINARY:  no dysuria; no hematuria    	•	MUSCULOSKELETAL:  no back pain  	•	NEUROLOGIC: no weakness  	•	ALLERGY: no rhinitis  	•	PSYSCHIATRIC: no anxiety

## 2019-12-15 NOTE — ED STATDOCS - PHYSICAL EXAMINATION
*GEN:  well appearing; A+O x3 +moderate distress  *HEAD: Normocephalic, Atraumatic  *EYES/NOSE: PERRL & EOMI b/l  *THROAT: airway patent, moist mucous membranes  *NECK: Neck supple, no masses  *PULMONARY: CTA b/l, symmetric breath sounds.   *CARDIAC: s1s2, regular rhythm, no Murmur  *ABDOMEN:  , Non Distended, soft, no guarding, no rebound, no masses +diffuse tenderness to abd  *BACK: no CVA tenderness, Normal  spine   *EXTREMITIES: symmetric pulses, 2+ dp & radial pulses, capillary refill < 2 seconds, no cyanosis, no edema   *SKIN: no rash or bruising   *NEUROLOGIC: alert, CN 2-12 intact, moves all 4 extremities, full active & passive ROM in all extremities, normal baseline gait  *PSYCH: insight and judgment nl, memory nl, affect nl, thought nl

## 2019-12-16 LAB
ALBUMIN SERPL ELPH-MCNC: 3.1 G/DL — LOW (ref 3.3–5)
ALP SERPL-CCNC: 55 U/L — SIGNIFICANT CHANGE UP (ref 40–120)
ALT FLD-CCNC: 95 U/L — HIGH (ref 12–78)
ANION GAP SERPL CALC-SCNC: 6 MMOL/L — SIGNIFICANT CHANGE UP (ref 5–17)
AST SERPL-CCNC: 70 U/L — HIGH (ref 15–37)
BASOPHILS # BLD AUTO: 0.03 K/UL — SIGNIFICANT CHANGE UP (ref 0–0.2)
BASOPHILS NFR BLD AUTO: 0.4 % — SIGNIFICANT CHANGE UP (ref 0–2)
BILIRUB SERPL-MCNC: 0.3 MG/DL — SIGNIFICANT CHANGE UP (ref 0.2–1.2)
BUN SERPL-MCNC: 4 MG/DL — LOW (ref 7–23)
CALCIUM SERPL-MCNC: 8.4 MG/DL — LOW (ref 8.5–10.1)
CHLORIDE SERPL-SCNC: 108 MMOL/L — SIGNIFICANT CHANGE UP (ref 96–108)
CO2 SERPL-SCNC: 27 MMOL/L — SIGNIFICANT CHANGE UP (ref 22–31)
CREAT SERPL-MCNC: 0.52 MG/DL — SIGNIFICANT CHANGE UP (ref 0.5–1.3)
EOSINOPHIL # BLD AUTO: 0.34 K/UL — SIGNIFICANT CHANGE UP (ref 0–0.5)
EOSINOPHIL NFR BLD AUTO: 4.2 % — SIGNIFICANT CHANGE UP (ref 0–6)
GLUCOSE SERPL-MCNC: 104 MG/DL — HIGH (ref 70–99)
HCT VFR BLD CALC: 33.1 % — LOW (ref 34.5–45)
HGB BLD-MCNC: 11.2 G/DL — LOW (ref 11.5–15.5)
IMM GRANULOCYTES NFR BLD AUTO: 0.4 % — SIGNIFICANT CHANGE UP (ref 0–1.5)
INR BLD: 1.14 RATIO — SIGNIFICANT CHANGE UP (ref 0.88–1.16)
LYMPHOCYTES # BLD AUTO: 2.25 K/UL — SIGNIFICANT CHANGE UP (ref 1–3.3)
LYMPHOCYTES # BLD AUTO: 27.8 % — SIGNIFICANT CHANGE UP (ref 13–44)
MAGNESIUM SERPL-MCNC: 2 MG/DL — SIGNIFICANT CHANGE UP (ref 1.6–2.6)
MCHC RBC-ENTMCNC: 31.3 PG — SIGNIFICANT CHANGE UP (ref 27–34)
MCHC RBC-ENTMCNC: 33.8 GM/DL — SIGNIFICANT CHANGE UP (ref 32–36)
MCV RBC AUTO: 92.5 FL — SIGNIFICANT CHANGE UP (ref 80–100)
MONOCYTES # BLD AUTO: 1.15 K/UL — HIGH (ref 0–0.9)
MONOCYTES NFR BLD AUTO: 14.2 % — HIGH (ref 2–14)
NEUTROPHILS # BLD AUTO: 4.3 K/UL — SIGNIFICANT CHANGE UP (ref 1.8–7.4)
NEUTROPHILS NFR BLD AUTO: 53 % — SIGNIFICANT CHANGE UP (ref 43–77)
PHOSPHATE SERPL-MCNC: 3.7 MG/DL — SIGNIFICANT CHANGE UP (ref 2.5–4.5)
PLATELET # BLD AUTO: 384 K/UL — SIGNIFICANT CHANGE UP (ref 150–400)
POTASSIUM SERPL-MCNC: 3.8 MMOL/L — SIGNIFICANT CHANGE UP (ref 3.5–5.3)
POTASSIUM SERPL-SCNC: 3.8 MMOL/L — SIGNIFICANT CHANGE UP (ref 3.5–5.3)
PROT SERPL-MCNC: 6 GM/DL — SIGNIFICANT CHANGE UP (ref 6–8.3)
PROTHROM AB SERPL-ACNC: 12.7 SEC — SIGNIFICANT CHANGE UP (ref 10–12.9)
RBC # BLD: 3.58 M/UL — LOW (ref 3.8–5.2)
RBC # FLD: 12.6 % — SIGNIFICANT CHANGE UP (ref 10.3–14.5)
SODIUM SERPL-SCNC: 141 MMOL/L — SIGNIFICANT CHANGE UP (ref 135–145)
WBC # BLD: 8.1 K/UL — SIGNIFICANT CHANGE UP (ref 3.8–10.5)
WBC # FLD AUTO: 8.1 K/UL — SIGNIFICANT CHANGE UP (ref 3.8–10.5)

## 2019-12-16 RX ORDER — SODIUM CHLORIDE 9 MG/ML
1000 INJECTION INTRAMUSCULAR; INTRAVENOUS; SUBCUTANEOUS
Refills: 0 | Status: DISCONTINUED | OUTPATIENT
Start: 2019-12-16 | End: 2019-12-19

## 2019-12-16 RX ORDER — CIPROFLOXACIN LACTATE 400MG/40ML
VIAL (ML) INTRAVENOUS
Refills: 0 | Status: DISCONTINUED | OUTPATIENT
Start: 2019-12-16 | End: 2019-12-19

## 2019-12-16 RX ORDER — METRONIDAZOLE 500 MG
500 TABLET ORAL EVERY 8 HOURS
Refills: 0 | Status: DISCONTINUED | OUTPATIENT
Start: 2019-12-16 | End: 2019-12-19

## 2019-12-16 RX ORDER — ACETAMINOPHEN 500 MG
650 TABLET ORAL EVERY 6 HOURS
Refills: 0 | Status: DISCONTINUED | OUTPATIENT
Start: 2019-12-16 | End: 2019-12-19

## 2019-12-16 RX ORDER — MORPHINE SULFATE 50 MG/1
4 CAPSULE, EXTENDED RELEASE ORAL EVERY 4 HOURS
Refills: 0 | Status: DISCONTINUED | OUTPATIENT
Start: 2019-12-16 | End: 2019-12-19

## 2019-12-16 RX ORDER — CIPROFLOXACIN LACTATE 400MG/40ML
200 VIAL (ML) INTRAVENOUS EVERY 12 HOURS
Refills: 0 | Status: DISCONTINUED | OUTPATIENT
Start: 2019-12-17 | End: 2019-12-19

## 2019-12-16 RX ORDER — CIPROFLOXACIN LACTATE 400MG/40ML
200 VIAL (ML) INTRAVENOUS ONCE
Refills: 0 | Status: COMPLETED | OUTPATIENT
Start: 2019-12-16 | End: 2019-12-16

## 2019-12-16 RX ADMIN — ZOLPIDEM TARTRATE 5 MILLIGRAM(S): 10 TABLET ORAL at 23:10

## 2019-12-16 RX ADMIN — SODIUM CHLORIDE 100 MILLILITER(S): 9 INJECTION INTRAMUSCULAR; INTRAVENOUS; SUBCUTANEOUS at 12:27

## 2019-12-16 RX ADMIN — MORPHINE SULFATE 4 MILLIGRAM(S): 50 CAPSULE, EXTENDED RELEASE ORAL at 14:45

## 2019-12-16 RX ADMIN — Medication 100 MICROGRAM(S): at 05:56

## 2019-12-16 RX ADMIN — Medication 100 MILLIGRAM(S): at 23:10

## 2019-12-16 RX ADMIN — Medication 500 MILLIGRAM(S): at 05:56

## 2019-12-16 RX ADMIN — MORPHINE SULFATE 4 MILLIGRAM(S): 50 CAPSULE, EXTENDED RELEASE ORAL at 21:04

## 2019-12-16 RX ADMIN — LOSARTAN POTASSIUM 25 MILLIGRAM(S): 100 TABLET, FILM COATED ORAL at 12:24

## 2019-12-16 RX ADMIN — TRAMADOL HYDROCHLORIDE 50 MILLIGRAM(S): 50 TABLET ORAL at 08:35

## 2019-12-16 RX ADMIN — Medication 100 MILLIGRAM(S): at 21:59

## 2019-12-16 RX ADMIN — SODIUM CHLORIDE 100 MILLILITER(S): 9 INJECTION INTRAMUSCULAR; INTRAVENOUS; SUBCUTANEOUS at 18:26

## 2019-12-16 RX ADMIN — Medication 1000 UNIT(S): at 12:24

## 2019-12-16 RX ADMIN — POLYETHYLENE GLYCOL 3350 17 GRAM(S): 17 POWDER, FOR SOLUTION ORAL at 18:27

## 2019-12-16 RX ADMIN — MORPHINE SULFATE 4 MILLIGRAM(S): 50 CAPSULE, EXTENDED RELEASE ORAL at 20:49

## 2019-12-16 RX ADMIN — Medication 500 MILLIGRAM(S): at 13:57

## 2019-12-16 RX ADMIN — Medication 500 MILLIGRAM(S): at 18:27

## 2019-12-16 NOTE — DIETITIAN INITIAL EVALUATION ADULT. - PERTINENT LABORATORY DATA
12-16 Na141 mmol/L Glu 104 mg/dL<H> K+ 3.8 mmol/L Cr  0.52 mg/dL BUN 4 mg/dL<L> Phos 3.7 mg/dL Alb 3.1 g/dL<L> PAB n/a

## 2019-12-16 NOTE — CHART NOTE - NSCHARTNOTEFT_GEN_A_CORE
Upon Nutritional Assessment by the Registered Dietitian your patient was determined to meet criteria / has evidence of the following diagnosis/diagnoses:          [ ]  Mild Protein Calorie Malnutrition        [ ]  Moderate Protein Calorie Malnutrition        [x ] Severe Protein Calorie Malnutrition        [ ] Unspecified Protein Calorie Malnutrition        [ ] Underweight / BMI <19        [ ] Morbid Obesity / BMI > 40      Findings as based on:  •  Comprehensive nutrition assessment and consultation  •  Calorie counts (nutrient intake analysis)  •  Food acceptance and intake status from observations by staff  •  Follow up  •  Patient education  •  Intervention secondary to interdisciplinary rounds  •   concerns      *********Malnutrition severe protein/calorie malnutrition in context of acute illness.   Etiology AEB inability to consume adequate nutrition to meet needs 2/2 gastroenteritis.   Signs/Symptoms poor po intake, moderate muscle/fat wasting.   Goal/Expected Outcome tolerance of advanced po diet to meet >80% of ENN. Reduced s/s of malnutrition.    Treatment:      1) advance po diet when feasible to regular   2) monitor labs/lytes and replete as needed.  3) monitor daily weights 4) MVI with minerals to meet RDI's   5) if po remains poor consider po supplement.      PROVIDER Section:     By signing this assessment you are acknowledging and agree with the diagnosis/diagnoses assigned by the Registered Dietitian    Comments:

## 2019-12-16 NOTE — CONSULT NOTE ADULT - ATTENDING COMMENTS
Pt seen and examined  Agree with above    Feeling better  Has an appointment at [place] for dilatation    Will follow as needed

## 2019-12-16 NOTE — DIETITIAN INITIAL EVALUATION ADULT. - PHYSICAL APPEARANCE
other (specify)/underweight NFPE Findings: Unable to complete NFPE due to pain.  MUSCLE WASTING: Severe:  ( x ) Clavicle  Moderate: (x) Temporal   FAT LOSS: Moderate: ( x ) Ocular   Skin intact with no edema documented. Garett score- 21 (low risk for skin breakdown).

## 2019-12-16 NOTE — PROGRESS NOTE ADULT - SUBJECTIVE AND OBJECTIVE BOX
History of Present Illness: 	  59 year old female patient previously discharged after inpatient admission for stercoral colitis presented back to the ED within 48hrs complaining of severe abdominal pain associated with nausea, anorexia and constipation. Patient states she has not had a bowel movement since given the bowel prep for her last colonoscopy on 12/12/19. Patient describes a diffuse, sharp abdominal pain with no clear aggravating or alleviating factors. Patient was not able to see GI Specialist(Dr Durant) for possible bowel dilation procedure. Patient still taking cipro/flagyl. Denies any fever, chills, vomiting, chest discomfort, bloody bowel movements or diarrhea. Patient still passing flatus.      In the ED patient had a repeat CT abdomen, which revealed Persistent stercoral colitis involving the sigmoid colon. Large amount of   stool throughout the colon, mildly decreased compared with December 09, 2019.    12.16: seen at 11am, s/p large BM, feeling much better, no abd pain  -called to see pt again at 4pm, c/o recurrent LLQ abd pain, severe 8/10 intensity             REVIEW OF SYSTEMS:    CONSTITUTIONAL: No weakness, No fevers or chills  ENT: No ear ache, No sorethroat  NECK: No pain, No stiffness  RESPIRATORY: No cough, No wheezing, No hemoptysis; No dyspnea  CARDIOVASCULAR: No chest pain, No palpitations  GASTROINTESTINAL: ++abd pain, No nausea, No vomiting, No hematemesis, No diarrhea or constipation. No melena, No hematochezia.  GENITOURINARY: No dysuria, No  hematuria  NEUROLOGICAL: No diplopia, No paresthesia, No motor dysfunction  MUSCULOSKELETAL: No arthralgia, No myalgia  SKIN: No rashes, or lesions   PSYCH: no anxiety, no suicidal ideation    All other review of systems is negative unless indicated above    Vital Signs Last 24 Hrs  T(C): 37 (16 Dec 2019 11:41), Max: 37 (16 Dec 2019 11:41)  T(F): 98.6 (16 Dec 2019 11:41), Max: 98.6 (16 Dec 2019 11:41)  HR: 59 (16 Dec 2019 11:41) (59 - 62)  BP: 121/67 (16 Dec 2019 11:41) (118/69 - 121/67)  BP(mean): --  RR: 16 (16 Dec 2019 11:41) (16 - 16)  SpO2: 99% (16 Dec 2019 11:41) (99% - 99%)    PHYSICAL EXAM:    GENERAL: NAD, Well nourished  HEENT:  NC/AT, EOMI, PERRLA, No scleral icterus, Moist mucous membranes  NECK: Supple, No JVD  CNS:  Alert & Oriented X3, Motor Strength 5/5 B/L upper and lower extremities; DTRs 2+ intact   LUNG: Normal Breath sounds, Clear to auscultation bilaterally, No rales, No rhonchi, No wheezing  HEART: RRR; No murmurs, No rubs  ABDOMEN: +BS, ST, ND, +TTP to LLQ, +guarding   GENITOURINARY: Voiding, Bladder not distended  EXTREMITIES:  2+ Peripheral Pulses, No clubbing, No cyanosis, No tibial edema  MUSCULOSKELTAL: Joints normal ROM, No TTP, No effusion  VAGINAL: deferred  SKIN: no rashes  RECTAL: deferred, not indicated  BREAST: deferred                          11.2   8.10  )-----------( 384      ( 16 Dec 2019 06:38 )             33.1     12-16    141  |  108  |  4<L>  ----------------------------<  104<H>  3.8   |  27  |  0.52    Ca    8.4<L>      16 Dec 2019 06:38  Phos  3.7     12-16  Mg     2.0     12-16    TPro  6.0  /  Alb  3.1<L>  /  TBili  0.3  /  DBili  x   /  AST  70<H>  /  ALT  95<H>  /  AlkPhos  55  12-16    Vancomycin levels:   Cultures:     MEDICATIONS  (STANDING):  cholecalciferol 1000 Unit(s) Oral daily  ciprofloxacin     Tablet 500 milliGRAM(s) Oral every 12 hours  enoxaparin Injectable 40 milliGRAM(s) SubCutaneous daily  levothyroxine 100 MICROGram(s) Oral daily  losartan 25 milliGRAM(s) Oral daily  metroNIDAZOLE    Tablet 500 milliGRAM(s) Oral three times a day  mineral oil 30 milliLiter(s) Oral two times a day  nystatin    Suspension 118358 Unit(s) Oral four times a day  ondansetron Injectable 4 milliGRAM(s) IV Push every 6 hours  pantoprazole    Tablet 40 milliGRAM(s) Oral before breakfast  polyethylene glycol 3350 17 Gram(s) Oral every 12 hours  sodium chloride 0.9%. 1000 milliLiter(s) (100 mL/Hr) IV Continuous <Continuous>    MEDICATIONS  (PRN):  acetaminophen   Tablet .. 650 milliGRAM(s) Oral every 6 hours PRN Temp greater or equal to 38C (100.4F), Mild Pain (1 - 3), Moderate Pain (4 - 6)  morphine  - Injectable 4 milliGRAM(s) IV Push every 4 hours PRN Severe Pain (7 - 10)  zolpidem 5 milliGRAM(s) Oral at bedtime PRN Insomnia      all labs reviewed  all imaging reviewed    a/p:    1. Stercoral colitis:  s/p large BM, with recurrence of pain within a few hours  c/w Cipro/Flagyl IV  IV hydration   analgesia prn  GI and surgical evaluations  on last admission she was offered surgery to remove large fecalith  noted during colonoscopy   c/w mineral oil, c/w miralax

## 2019-12-16 NOTE — DIETITIAN INITIAL EVALUATION ADULT. - ADD RECOMMEND
1) 1) advance po diet when feasible to regular 2) monitor labs/lytes and replete as needed. 3) monitor daily weights 4) MVI with minerals to meet RDI's 5) if po remains poor consider po supplement.

## 2019-12-16 NOTE — CONSULT NOTE ADULT - SUBJECTIVE AND OBJECTIVE BOX
59 year old female patient previously discharged after inpatient admission for stercoral colitis presented yesterday back to the ED complaining of severe abdominal pain associated with nausea, anorexia and constipation. Patient states she has not had a bowel movement since given the bowel prep for her last colonoscopy on 12/12/19. Patient describes a diffuse, sharp abdominal pain with no clear aggravating or alleviating factors. Patient was not able to see GI Specialist for possible bowel dilation procedure. Patient still taking cipro/flagyl. Denies any fever, chills, vomiting, chest discomfort, bloody bowel movements or diarrhea. Patient still passing flatus.In the ED patient had a repeat CT abdomen, which revealed Persistent stercoral colitis involving the sigmoid colon. Large amount of stool throughout the colon, mildly decreased compared with December 09, 2019. During the encounter, the patient states that she has big bowel movement yesterday night and she is pain free now. She denied any nausea, vomiting, fever, chills.     Patient History:   Past Medical, Past Surgical, and Family History:  PAST MEDICAL HISTORY: Back pain, Fracture Lumbar 1, Hashimoto's disease, Herniated nucleus pulposus, lumbar, Herniated nucleus pulposus, thoracic, HLD (hyperlipidemia) diet controlled, HTN (hypertension), Osteoporosis.     PAST SURGICAL HISTORY: History of tonsillectomy, S/P arthroscopy of right knee 7/2016 and done prior about 10 years ago, S/P cervical discectomy 5/2013, S/P hip replacement, left 2017, S/P hip replacement, right 2012, S/P insertion of spinal cord stimulator 10/2017, S/P lumbar fusion 7/2012 .3/ 2017.    PE:   · Constitutional	Well-developed, well nourished	  · Eyes	EOMI; PERRL; no drainage or redness	  · ENMT	No oral lesions; no gross abnormalities	  · Neck	No bruits; no thyromegaly or nodules	  · Respiratory	Breath Sounds equal & clear to percussion & auscultation, no accessory muscle use	  · Cardiovascular	Regular rate & rhythm, normal S1, S2; no murmurs, gallops or rubs; no S3, S4	  · Gastrointestinal	detailed exam	  · GI Normal	soft, non distended or tender, no RGR.	  		  		  · Extremities	No cyanosis, clubbing or edema	  · Vascular	Equal and normal pulses (carotid, femoral, dorsalis pedis)	  · Neurological	Alert & oriented; no sensory, motor or coordination deficits, normal reflexes	  · Skin	No lesions; no rash	  · Lymph Nodes	No lymphadedenopathy	  · Musculoskeletal	No joint pain, swelling or deformity; no limitation of movement	                            11.2   8.10  )-----------( 384      ( 16 Dec 2019 06:38 )             33.1     12-16    141  |  108  |  4<L>  ----------------------------<  104<H>  3.8   |  27  |  0.52    Ca    8.4<L>      16 Dec 2019 06:38  Phos  3.7     12-16  Mg     2.0     12-16    TPro  6.0  /  Alb  3.1<L>  /  TBili  0.3  /  DBili  x   /  AST  70<H>  /  ALT  95<H>  /  AlkPhos  55  12-16    PT/INR - ( 16 Dec 2019 06:38 )   PT: 12.7 sec;   INR: 1.14 ratio         PTT - ( 15 Dec 2019 15:16 )  PTT:31.9 sec    Imaging:    EXAM:  CT ABDOMEN AND PELVIS OC                            PROCEDURE DATE:  12/15/2019          INTERPRETATION:  CLINICAL INFORMATION: Abdominal pain.   Stercoral colitis    COMPARISON: 12.9.19    PROCEDURE:   CT of the Abdomen and Pelvis was performed without intravenous contrast.   Intravenous contrast: None.  Oral contrast: positive contrast was administered.  Sagittal and coronal reformats were performed.    FINDINGS:    LOWER CHEST: Coronary artery calcifications.    LIVER: Within normal limits.  BILE DUCTS: Normal caliber.  GALLBLADDER: Within normal limits.  SPLEEN: Within normal limits.  PANCREAS: Within normal limits.  ADRENALS: Within normal limits.  KIDNEYS/URETERS: Within normal limits.    Pelvis: Markedly limited evaluation of the lower pelvis due to the marked   artifact from the patient's bilateral hip arthroplasties. The uterus,   adnexa and urinary bladder not adequately evaluated.    BOWEL: No bowel obstruction. Appendix is normal. Large amount of stool   throughout the colon, mildly decreased since the prior examination. Wall   thickening of the sigmoid colon and pericolic fat stranding, mildly   improved.  PERITONEUM: No ascites.  VESSELS: Within normal limits.  RETROPERITONEUM/LYMPH NODES: No lymphadenopathy.    ABDOMINAL WALL: Right buttocks spinal stimulator.  BONES: No acute abnormality.  Bilateral hip arthroplasties,   post-operative changes in the lumbar spine and L2 vertebroplasty again   noted.    IMPRESSION:       Persistent stercoral colitis involving the sigmoid colon. Large amount of   stool throughout the colon, mildly decreased compared with December 09, 2019.

## 2019-12-16 NOTE — CONSULT NOTE ADULT - ASSESSMENT
59 year old female patient previously discharged after inpatient admission for stercoral colitis presented yesterday back to the ED complaining of severe abdominal pain associated with nausea, anorexia and constipation. Patient states she has not had a bowel movement since given the bowel prep for her last colonoscopy on 12/12/19. Patient .     Plan:  -The patient stats that she has bowel movement and she is pain free.  -F/U GI recommendations.  - Advance diet as tolerated.  - Continue on laxatives .  - The patient can come for follow up with Dr. Brunner in his office 2 weeks after discharge.  - No surgical intervention needed at this time.  -general surgery team will sign off this patient, please reconsult again if needed.    The plan was discussed with Dr. Brunner. 59 year old female patient previously discharged after inpatient admission for stercoral colitis presented yesterday back to the ED complaining of severe abdominal pain associated with nausea, anorexia and constipation. Patient states she has not had a bowel movement since given the bowel prep for her last colonoscopy on 12/12/19. Patient .     Plan:  -The patient stats that she has bowel movement and she is pain free.  -F/U GI recommendations.  - Advance diet as tolerated.  - Continue on laxatives .  - The patient can come for follow up with Dr. Brunner in his office 2 weeks after discharge.  - No surgical intervention needed at this time.      The plan was discussed with Dr. Brunner.

## 2019-12-16 NOTE — DIETITIAN INITIAL EVALUATION ADULT. - OTHER INFO
59 year old female patient previously discharged after inpatient admission for stercoral colitis presented yesterday back to the ED complaining of severe abdominal pain associated with nausea, anorexia and constipation. Patient states she has not had a bowel movement since given the bowel prep for her last colonoscopy on 12/12/19. Patient describes a diffuse, sharp abdominal pain with no clear aggravating or alleviating factors. Patient was not able to see GI Specialist for possible bowel dilation procedure. As per Surgery resident pt had large BM and feels pain free at this time. Plan to advance diet as tolerated. Pt remains on laxatives. No surgical intervention warranted at this time. 59 year old female patient previously discharged after inpatient admission for stercoral colitis presented yesterday back to the ED complaining of severe abdominal pain associated with nausea, anorexia and constipation. Patient states she has not had a bowel movement since given the bowel prep for her last colonoscopy on 12/12/19. Patient describes a diffuse, sharp abdominal pain with no clear aggravating or alleviating factors. Patient was not able to see GI Specialist for possible bowel dilation procedure. As per Surgery resident pt had large BM and feels pain free at this time. Plan to advance diet as tolerated. Pt remains on laxatives. No surgical intervention warranted at this time. Diet advanced to full liquids however during visit pt with extreme abdominal pain-medication given with good effect. Pt appears thin and underweight. BMI 23.1 (normal). PO intake poor x > 5 days noted. Will continue to monitor tolerance of po diet and advance when medically feasible.

## 2019-12-16 NOTE — DIETITIAN INITIAL EVALUATION ADULT. - PERTINENT MEDS FT
MEDICATIONS  (STANDING):  cholecalciferol 1000 Unit(s) Oral daily  ciprofloxacin     Tablet 500 milliGRAM(s) Oral every 12 hours  enoxaparin Injectable 40 milliGRAM(s) SubCutaneous daily  levothyroxine 100 MICROGram(s) Oral daily  losartan 25 milliGRAM(s) Oral daily  metroNIDAZOLE    Tablet 500 milliGRAM(s) Oral three times a day  mineral oil 30 milliLiter(s) Oral two times a day  nystatin    Suspension [id number] Unit(s) Oral four times a day  ondansetron Injectable 4 milliGRAM(s) IV Push every 6 hours  pantoprazole    Tablet 40 milliGRAM(s) Oral before breakfast  polyethylene glycol 3350 17 Gram(s) Oral every 12 hours  sodium chloride 0.9%. 1000 milliLiter(s) (100 mL/Hr) IV Continuous <Continuous>    MEDICATIONS  (PRN):  acetaminophen   Tablet .. 650 milliGRAM(s) Oral every 6 hours PRN Temp greater or equal to 38C (100.4F), Mild Pain (1 - 3), Moderate Pain (4 - 6)  morphine  - Injectable 4 milliGRAM(s) IV Push every 6 hours PRN Severe Pain (7 - 10)  zolpidem 5 milliGRAM(s) Oral at bedtime PRN Insomnia

## 2019-12-17 LAB
ANION GAP SERPL CALC-SCNC: 7 MMOL/L — SIGNIFICANT CHANGE UP (ref 5–17)
BUN SERPL-MCNC: 3 MG/DL — LOW (ref 7–23)
CALCIUM SERPL-MCNC: 8.4 MG/DL — LOW (ref 8.5–10.1)
CHLORIDE SERPL-SCNC: 110 MMOL/L — HIGH (ref 96–108)
CO2 SERPL-SCNC: 27 MMOL/L — SIGNIFICANT CHANGE UP (ref 22–31)
CREAT SERPL-MCNC: 0.54 MG/DL — SIGNIFICANT CHANGE UP (ref 0.5–1.3)
CULTURE RESULTS: SIGNIFICANT CHANGE UP
GLUCOSE SERPL-MCNC: 113 MG/DL — HIGH (ref 70–99)
HCT VFR BLD CALC: 34.3 % — LOW (ref 34.5–45)
HGB BLD-MCNC: 11.5 G/DL — SIGNIFICANT CHANGE UP (ref 11.5–15.5)
MCHC RBC-ENTMCNC: 31.4 PG — SIGNIFICANT CHANGE UP (ref 27–34)
MCHC RBC-ENTMCNC: 33.5 GM/DL — SIGNIFICANT CHANGE UP (ref 32–36)
MCV RBC AUTO: 93.7 FL — SIGNIFICANT CHANGE UP (ref 80–100)
PLATELET # BLD AUTO: 388 K/UL — SIGNIFICANT CHANGE UP (ref 150–400)
POTASSIUM SERPL-MCNC: 3.8 MMOL/L — SIGNIFICANT CHANGE UP (ref 3.5–5.3)
POTASSIUM SERPL-SCNC: 3.8 MMOL/L — SIGNIFICANT CHANGE UP (ref 3.5–5.3)
RBC # BLD: 3.66 M/UL — LOW (ref 3.8–5.2)
RBC # FLD: 12.6 % — SIGNIFICANT CHANGE UP (ref 10.3–14.5)
SODIUM SERPL-SCNC: 144 MMOL/L — SIGNIFICANT CHANGE UP (ref 135–145)
SPECIMEN SOURCE: SIGNIFICANT CHANGE UP
WBC # BLD: 6.14 K/UL — SIGNIFICANT CHANGE UP (ref 3.8–10.5)
WBC # FLD AUTO: 6.14 K/UL — SIGNIFICANT CHANGE UP (ref 3.8–10.5)

## 2019-12-17 RX ADMIN — ENOXAPARIN SODIUM 40 MILLIGRAM(S): 100 INJECTION SUBCUTANEOUS at 12:20

## 2019-12-17 RX ADMIN — ZOLPIDEM TARTRATE 5 MILLIGRAM(S): 10 TABLET ORAL at 22:48

## 2019-12-17 RX ADMIN — Medication 100 MILLIGRAM(S): at 10:51

## 2019-12-17 RX ADMIN — Medication 30 MILLILITER(S): at 18:28

## 2019-12-17 RX ADMIN — POLYETHYLENE GLYCOL 3350 17 GRAM(S): 17 POWDER, FOR SOLUTION ORAL at 17:34

## 2019-12-17 RX ADMIN — Medication 400000 UNIT(S): at 12:20

## 2019-12-17 RX ADMIN — Medication 100 MILLIGRAM(S): at 15:44

## 2019-12-17 RX ADMIN — POLYETHYLENE GLYCOL 3350 17 GRAM(S): 17 POWDER, FOR SOLUTION ORAL at 05:41

## 2019-12-17 RX ADMIN — SODIUM CHLORIDE 100 MILLILITER(S): 9 INJECTION INTRAMUSCULAR; INTRAVENOUS; SUBCUTANEOUS at 21:30

## 2019-12-17 RX ADMIN — LOSARTAN POTASSIUM 25 MILLIGRAM(S): 100 TABLET, FILM COATED ORAL at 05:41

## 2019-12-17 RX ADMIN — Medication 100 MICROGRAM(S): at 05:08

## 2019-12-17 RX ADMIN — Medication 100 MILLIGRAM(S): at 05:08

## 2019-12-17 RX ADMIN — PANTOPRAZOLE SODIUM 40 MILLIGRAM(S): 20 TABLET, DELAYED RELEASE ORAL at 05:41

## 2019-12-17 RX ADMIN — Medication 100 MILLIGRAM(S): at 22:48

## 2019-12-17 RX ADMIN — ONDANSETRON 4 MILLIGRAM(S): 8 TABLET, FILM COATED ORAL at 17:33

## 2019-12-17 RX ADMIN — Medication 1000 UNIT(S): at 12:20

## 2019-12-17 RX ADMIN — ONDANSETRON 4 MILLIGRAM(S): 8 TABLET, FILM COATED ORAL at 12:20

## 2019-12-17 RX ADMIN — SODIUM CHLORIDE 100 MILLILITER(S): 9 INJECTION INTRAMUSCULAR; INTRAVENOUS; SUBCUTANEOUS at 02:08

## 2019-12-17 RX ADMIN — MORPHINE SULFATE 4 MILLIGRAM(S): 50 CAPSULE, EXTENDED RELEASE ORAL at 17:33

## 2019-12-17 RX ADMIN — Medication 100 MILLIGRAM(S): at 21:29

## 2019-12-17 NOTE — CONSULT NOTE ADULT - SUBJECTIVE AND OBJECTIVE BOX
Patient is a 59y old  Female who presents with a chief complaint of Intractable Abdominal pain/ Stercoral colitis (16 Dec 2019 21:16)      HPI:  59 year old female patient previously discharged after inpatient admission for stercoral colitis presented back to the ED complaining of severe abdominal pain associated with nausea, anorexia and constipation. Patient states she has not had a bowel movement since given the bowel prep for her last colonoscopy on 12/12/19. Patient describes a diffuse, sharp abdominal pain with no clear aggravating or alleviating factors. Patient was not able to see GI Specialist(Dr Durant) for possible bowel dilation procedure. Patient still taking cipro/flagyl. Denies any fever, chills, vomiting, chest discomfort, bloody bowel movements or diarrhea. Patient still passing flatus.      In the ED patient had a repeat CT abdomen, which revealed Persistent stercoral colitis involving the sigmoid colon. Large amount of   stool throughout the colon, mildly decreased compared with December 09, 2019. (15 Dec 2019 19:30)    patient relates history of having had no bowel movements after discharge from the hospital. She had some recurrent episodes of abdominal pain in the left lower quadrant that at times became severe for her. She had been taking mineral oil and MiraLAX. While in the emergency room, patient feels that she passed a piece of stool that she states was almost the size of her fist.  Negative nausea or vomiting. Negative chest pain at this time.  The pain was in the left lower quadrant, crampy, crescendo decrescendo, had some distention associated with it in the abdomen. The pain is improving at this time        PAST MEDICAL & SURGICAL HISTORY:  Osteoporosis  Fracture: Lumbar 1  HLD (hyperlipidemia): diet controlled  Herniated nucleus pulposus, thoracic  Herniated nucleus pulposus, lumbar  HTN (hypertension)  Hashimoto's disease  Back pain  S/P insertion of spinal cord stimulator: 10/2017  S/P hip replacement, left: 2017  History of tonsillectomy  S/P arthroscopy of right knee: 7/2016 and done prior about 10 years ago  S/P cervical discectomy: 5/2013  S/P lumbar fusion: 7/2012 .3/ 2017  S/P hip replacement, right: 2012      MEDICATIONS  (STANDING):  cholecalciferol 1000 Unit(s) Oral daily  ciprofloxacin   IVPB      ciprofloxacin   IVPB 200 milliGRAM(s) IV Intermittent every 12 hours  enoxaparin Injectable 40 milliGRAM(s) SubCutaneous daily  levothyroxine 100 MICROGram(s) Oral daily  losartan 25 milliGRAM(s) Oral daily  metroNIDAZOLE  IVPB 500 milliGRAM(s) IV Intermittent every 8 hours  mineral oil 30 milliLiter(s) Oral two times a day  nystatin    Suspension [id number] Unit(s) Oral four times a day  ondansetron Injectable 4 milliGRAM(s) IV Push every 6 hours  pantoprazole    Tablet 40 milliGRAM(s) Oral before breakfast  polyethylene glycol 3350 17 Gram(s) Oral every 12 hours  sodium chloride 0.9%. 1000 milliLiter(s) (100 mL/Hr) IV Continuous <Continuous>    MEDICATIONS  (PRN):  acetaminophen   Tablet .. 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    Vital Signs Last 24 Hrs  T(C): 36.9 (17 Dec 2019 05:06), Max: 37 (16 Dec 2019 11:41)  T(F): 98.5 (17 Dec 2019 05:06), Max: 98.6 (16 Dec 2019 11:41)  HR: 56 (17 Dec 2019 05:06) (56 - 62)  BP: 102/53 (17 Dec 2019 05:06) (102/53 - 121/67)  BP(mean): --  RR: 17 (17 Dec 2019 05:06) (16 - 17)  SpO2: 99% (17 Dec 2019 05:06) (99% - 100%)    PHYSICAL EXAM:    Constitutional: NAD, well-developed  HEENT: EOMI, throat clear  Neck: No LAD, supple  Respiratory: CTA and P  Cardiovascular: S1 and S2, RRR, no M  Gastrointestinal: BS+, soft, LLQ tenderness, mild and improved per pt/ND, neg HSM,  Extremities: No peripheral edema, neg clubing, cyanosis  Vascular: 2+ peripheral pulses  Neurological: A/O x 3, no focal deficits  Psychiatric: Normal mood, normal affect  Skin: No rashes    LABS:  CBC Full  -  ( 17 Dec 2019 06:34 )  WBC Count : 6.14 K/uL  RBC Count : 3.66 M/uL  Hemoglobin : 11.5 g/dL  Hematocrit : 34.3 %  Platelet Count - Automated : 388 K/uL  Mean Cell Volume : 93.7 fl  Mean Cell Hemoglobin : 31.4 pg  Mean Cell Hemoglobin Concentration : 33.5 gm/dL  Auto Neutrophil # : x  Auto Lymphocyte # : x  Auto Monocyte # : x  Auto Eosinophil # : x  Auto Basophil # : x  Auto Neutrophil % : x  Auto Lymphocyte % : x  Auto Monocyte % : x  Auto Eosinophil % : x  Auto Basophil % : x    12-17    144  |  110<H>  |  3<L>  ----------------------------<  113<H>  3.8   |  27  |  0.54    Ca    8.4<L>      17 Dec 2019 06:34  Phos  3.7     12-16  Mg     2.0     12-16    TPro  6.0  /  Alb  3.1<L>  /  TBili  0.3  /  DBili  x   /  AST  70<H>  /  ALT  95<H>  /  AlkPhos  55  12-16    PT/INR - ( 16 Dec 2019 06:38 )   PT: 12.7 sec;   INR: 1.14 ratio         PTT - ( 15 Dec 2019 15:16 )  PTT:31.9 sec        RADIOLOGY & ADDITIONAL STUDIES:  < from: CT Abdomen and Pelvis w/ Oral Cont (12.15.19 @ 15:12) >  EXAM:  CT ABDOMEN AND PELVIS OC                            PROCEDURE DATE:  12/15/2019          INTERPRETATION:  CLINICAL INFORMATION: Abdominal pain.   Stercoral colitis    COMPARISON: 12.9.19    PROCEDURE:   CT of the Abdomen and Pelvis was performed without intravenous contrast.   Intravenous contrast: None.  Oral contrast: positive contrast was administered.  Sagittal and coronal reformats were performed.    FINDINGS:    LOWER CHEST: Coronary artery calcifications.    LIVER: Within normal limits.  BILE DUCTS: Normal caliber.  GALLBLADDER: Within normal limits.  SPLEEN: Within normal limits.  PANCREAS: Within normal limits.  ADRENALS: Within normal limits.  KIDNEYS/URETERS: Within normal limits.    Pelvis: Markedly limited evaluation of the lower pelvis due to the marked   artifact from the patient's bilateral hip arthroplasties. The uterus,   adnexa and urinary bladder not adequately evaluated.    BOWEL: No bowel obstruction. Appendix is normal. Large amount of stool   throughout the colon, mildly decreased since the prior examination. Wall   thickening of the sigmoid colon and pericolic fat stranding, mildly   improved.  PERITONEUM: No ascites.  VESSELS: Within normal limits.  RETROPERITONEUM/LYMPH NODES: No lymphadenopathy.    ABDOMINAL WALL: Right buttocks spinal stimulator.  BONES: No acute abnormality.  Bilateral hip arthroplasties,   post-operative changes in the lumbar spine and L2 vertebroplasty again   noted.    IMPRESSION:       Persistent stercoral colitis involving the sigmoid colon. Large amount of   stool throughout the colon, mildly decreased compared with December 09, 2019.                    NICHOLE MAST   This document has been electronically signed. Dec 15 2019  3:25PM          < end of copied text >

## 2019-12-17 NOTE — PROGRESS NOTE ADULT - SUBJECTIVE AND OBJECTIVE BOX
History of Present Illness: 	  59 year old female patient previously discharged after inpatient admission for stercoral colitis presented back to the ED within 48hrs complaining of severe abdominal pain associated with nausea, anorexia and constipation. Patient states she has not had a bowel movement since given the bowel prep for her last colonoscopy on 12/12/19. Patient describes a diffuse, sharp abdominal pain with no clear aggravating or alleviating factors. Patient was not able to see GI Specialist(Dr Durant) for possible bowel dilation procedure. Patient still taking cipro/flagyl. Denies any fever, chills, vomiting, chest discomfort, bloody bowel movements or diarrhea. Patient still passing flatus.      In the ED patient had a repeat CT abdomen, which revealed Persistent stercoral colitis involving the sigmoid colon. Large amount of   stool throughout the colon, mildly decreased compared with December 09, 2019.    12.16: seen at 11am, s/p large BM, feeling much better, no abd pain  -called to see pt again at 4pm, c/o recurrent LLQ abd pain, severe 8/10 intensity   12.17: seen at 11am, feeling well, had multiple BMs            REVIEW OF SYSTEMS:    CONSTITUTIONAL: No weakness, No fevers or chills  ENT: No ear ache, No sorethroat  NECK: No pain, No stiffness  RESPIRATORY: No cough, No wheezing, No hemoptysis; No dyspnea  CARDIOVASCULAR: No chest pain, No palpitations  GASTROINTESTINAL: ++abd pain, No nausea, No vomiting, No hematemesis, No diarrhea or constipation. No melena, No hematochezia.  GENITOURINARY: No dysuria, No  hematuria  NEUROLOGICAL: No diplopia, No paresthesia, No motor dysfunction  MUSCULOSKELETAL: No arthralgia, No myalgia  SKIN: No rashes, or lesions   PSYCH: no anxiety, no suicidal ideation    All other review of systems is negative unless indicated above    Vital Signs Last 24 Hrs  T(C): 36.8 (17 Dec 2019 11:19), Max: 36.9 (17 Dec 2019 05:06)  T(F): 98.3 (17 Dec 2019 11:19), Max: 98.5 (17 Dec 2019 05:06)  HR: 68 (17 Dec 2019 11:19) (56 - 68)  BP: 123/71 (17 Dec 2019 11:19) (102/53 - 123/71)  RR: 16 (17 Dec 2019 11:19) (16 - 17)  SpO2: 100% (17 Dec 2019 11:19) (99% - 100%)    PHYSICAL EXAM:    GENERAL: NAD, Well nourished  HEENT:  NC/AT, EOMI, PERRLA, No scleral icterus, Moist mucous membranes  NECK: Supple, No JVD  CNS:  Alert & Oriented X3, Motor Strength 5/5 B/L upper and lower extremities; DTRs 2+ intact   LUNG: Normal Breath sounds, Clear to auscultation bilaterally, No rales, No rhonchi, No wheezing  HEART: RRR; No murmurs, No rubs  ABDOMEN: +BS, ST, ND, +TTP to LLQ, +guarding   GENITOURINARY: Voiding, Bladder not distended  EXTREMITIES:  2+ Peripheral Pulses, No clubbing, No cyanosis, No tibial edema  MUSCULOSKELTAL: Joints normal ROM, No TTP, No effusion  VAGINAL: deferred  SKIN: no rashes  RECTAL: deferred, not indicated  BREAST: deferred               Labs:                        11.5   6.14  )-----------( 388      ( 17 Dec 2019 06:34 )             34.3     12-17    144  |  110<H>  |  3<L>  ----------------------------<  113<H>  3.8   |  27  |  0.54    Ca    8.4<L>      17 Dec 2019 06:34  Phos  3.7     12-16  Mg     2.0     12-16    TPro  6.0  /  Alb  3.1<L>  /  TBili  0.3  /  DBili  x   /  AST  70<H>  /  ALT  95<H>  /  AlkPhos  55  12-16           Cultures:   Vancomycin levels:   Cultures:     MEDICATIONS  (STANDING):  cholecalciferol 1000 Unit(s) Oral daily  ciprofloxacin     Tablet 500 milliGRAM(s) Oral every 12 hours  enoxaparin Injectable 40 milliGRAM(s) SubCutaneous daily  levothyroxine 100 MICROGram(s) Oral daily  losartan 25 milliGRAM(s) Oral daily  metroNIDAZOLE    Tablet 500 milliGRAM(s) Oral three times a day  mineral oil 30 milliLiter(s) Oral two times a day  nystatin    Suspension [id number] Unit(s) Oral four times a day  ondansetron Injectable 4 milliGRAM(s) IV Push every 6 hours  pantoprazole    Tablet 40 milliGRAM(s) Oral before breakfast  polyethylene glycol 3350 17 Gram(s) Oral every 12 hours  sodium chloride 0.9%. 1000 milliLiter(s) (100 mL/Hr) IV Continuous <Continuous>    MEDICATIONS  (PRN):  acetaminophen   Tablet .. 650 milliGRAM(s) Oral every 6 hours PRN Temp greater or equal to 38C (100.4F), Mild Pain (1 - 3), Moderate Pain (4 - 6)  morphine  - Injectable 4 milliGRAM(s) IV Push every 4 hours PRN Severe Pain (7 - 10)  zolpidem 5 milliGRAM(s) Oral at bedtime PRN Insomnia      all labs reviewed  all imaging reviewed    a/p:    1. Stercoral colitis:  s/p multiple BMs, intermittent llq pain  c/w Cipro/Flagyl IV  IV hydration   analgesia prn  GI and surgical evaluations noted   on last admission she was offered surgery to remove large fecalith  noted during colonoscopy   c/w mineral oil, c/w miralax

## 2019-12-17 NOTE — PROGRESS NOTE ADULT - SUBJECTIVE AND OBJECTIVE BOX
NATALIE VOGEL  MRN-[id number]  Female      Daily     Pt feeling well   cont to pass flatus and BM w/o abd pain          Daily Weight in k.9 (16 Dec 2019 11:31)    Physical Exam:    Pt is AAOx3  General: Well developed, in no acute distress.   Chest: Lungs clear, no rales, no rhonchi, no wheezes.   Heart: RR, no murmurs, no rubs, no gallops.   Abdomen: Soft, no tenderness, no masses, BS normal.    Back: Normal curvature, no tenderness.   Neuro: Physiological, no localizing findings.   Skin: Normal, no rashes, no lesions noted.   Extremities: Warm, well perfused, no edema, Pulses intact    I&O's Summary    16 Dec 2019 07:01  -  17 Dec 2019 07:00  --------------------------------------------------------  IN: 800 mL / OUT: 0 mL / NET: 800 mL                              11.5   6.14  )-----------( 388      ( 17 Dec 2019 06:34 )             34.3       12-17    144  |  110<H>  |  3<L>  ----------------------------<  113<H>  3.8   |  27  |  0.54    Ca    8.4<L>      17 Dec 2019 06:34  Phos  3.7     12-16  Mg     2.0     12-16    TPro  6.0  /  Alb  3.1<L>  /  TBili  0.3  /  DBili  x   /  AST  70<H>  /  ALT  95<H>  /  AlkPhos  55  12-16        HEALTH ISSUES - PROBLEM Dx:

## 2019-12-17 NOTE — PROGRESS NOTE ADULT - ASSESSMENT
Stercoral colitis  Pt feeling well and passing stool    I had a very long discussion with the patient. Pt has multiple fecaliths which may be starting to break up. I discussed with her options surgery to remove the stool, she would like to continue conservative management. She understands that she may require emergency surgery. She will like to try to continue with MiraLAX and mineral oil enemas. Eventually, she may benefit from colonic dilation. Pt will need further evaluation to be sure she is clearing the stool.    We also discussed the fact that she may eventually require surgery if she has a stricture.

## 2019-12-17 NOTE — CONSULT NOTE ADULT - ASSESSMENT
constipation associated with colitis.  Approximately a 7 cm segment of the sigmoid colon which is very edematous and swollen. This area was traversed with a colonoscope and initially was very tight however, appear to have become dilated more to the diameter of the colonoscope past it we traversed this region.  Large fecaliths noted above this area, largest one being probably about 5 x 3 cm and there was multiple other fecaliths above that area that were in the 3 cm range, fecalith was very firm, could not be broken apart with the water jet, biopsy forceps or snare.  I had an extensive discussion with the patient regarding options of therapy. Electrohydraulic lithotripsy, surgical intervention, attempting to dilate the area of stricturing after edema resolves and we have a better idea of what the underlying causes as well as if there is a stricture in that region    I had a very long discussion with the patient regarding multiple other fecaliths being above the larger fecalith that she may have passed. I am unclear with this she passed a large fecalith or other smaller ones. The other ones appeared to be smaller.discussed with her options surgery to remove the stool, however, understand is, at this time, she would like to continue conservative management. She understands that she may require emergency surgery.  She will like to try to continue with MiraLAX and mineral oil enemas. Eventually, she may benefit from colonic dilation.  However, we also discussed the fact that she may eventually require surgery either way.      continue laxatives, mineral oil, increased ambulation and activity.

## 2019-12-18 DIAGNOSIS — I10 ESSENTIAL (PRIMARY) HYPERTENSION: ICD-10-CM

## 2019-12-18 DIAGNOSIS — M51.26 OTHER INTERVERTEBRAL DISC DISPLACEMENT, LUMBAR REGION: ICD-10-CM

## 2019-12-18 DIAGNOSIS — K52.9 NONINFECTIVE GASTROENTERITIS AND COLITIS, UNSPECIFIED: ICD-10-CM

## 2019-12-18 DIAGNOSIS — E86.0 DEHYDRATION: ICD-10-CM

## 2019-12-18 DIAGNOSIS — K59.09 OTHER CONSTIPATION: ICD-10-CM

## 2019-12-18 DIAGNOSIS — E78.5 HYPERLIPIDEMIA, UNSPECIFIED: ICD-10-CM

## 2019-12-18 DIAGNOSIS — E06.3 AUTOIMMUNE THYROIDITIS: ICD-10-CM

## 2019-12-18 DIAGNOSIS — Z88.0 ALLERGY STATUS TO PENICILLIN: ICD-10-CM

## 2019-12-18 DIAGNOSIS — M51.24 OTHER INTERVERTEBRAL DISC DISPLACEMENT, THORACIC REGION: ICD-10-CM

## 2019-12-18 DIAGNOSIS — M81.0 AGE-RELATED OSTEOPOROSIS WITHOUT CURRENT PATHOLOGICAL FRACTURE: ICD-10-CM

## 2019-12-18 DIAGNOSIS — D72.829 ELEVATED WHITE BLOOD CELL COUNT, UNSPECIFIED: ICD-10-CM

## 2019-12-18 DIAGNOSIS — K56.609 UNSPECIFIED INTESTINAL OBSTRUCTION, UNSPECIFIED AS TO PARTIAL VERSUS COMPLETE OBSTRUCTION: ICD-10-CM

## 2019-12-18 DIAGNOSIS — K64.9 UNSPECIFIED HEMORRHOIDS: ICD-10-CM

## 2019-12-18 RX ADMIN — ZOLPIDEM TARTRATE 5 MILLIGRAM(S): 10 TABLET ORAL at 22:24

## 2019-12-18 RX ADMIN — Medication 100 MICROGRAM(S): at 06:00

## 2019-12-18 RX ADMIN — POLYETHYLENE GLYCOL 3350 17 GRAM(S): 17 POWDER, FOR SOLUTION ORAL at 17:15

## 2019-12-18 RX ADMIN — ONDANSETRON 4 MILLIGRAM(S): 8 TABLET, FILM COATED ORAL at 11:40

## 2019-12-18 RX ADMIN — Medication 100 MILLIGRAM(S): at 09:26

## 2019-12-18 RX ADMIN — Medication 100 MILLIGRAM(S): at 22:24

## 2019-12-18 RX ADMIN — Medication 30 MILLILITER(S): at 06:31

## 2019-12-18 RX ADMIN — Medication 100 MILLIGRAM(S): at 06:02

## 2019-12-18 RX ADMIN — Medication 100 MILLIGRAM(S): at 21:10

## 2019-12-18 RX ADMIN — PANTOPRAZOLE SODIUM 40 MILLIGRAM(S): 20 TABLET, DELAYED RELEASE ORAL at 06:31

## 2019-12-18 RX ADMIN — POLYETHYLENE GLYCOL 3350 17 GRAM(S): 17 POWDER, FOR SOLUTION ORAL at 06:31

## 2019-12-18 RX ADMIN — Medication 1000 UNIT(S): at 11:40

## 2019-12-18 RX ADMIN — ENOXAPARIN SODIUM 40 MILLIGRAM(S): 100 INJECTION SUBCUTANEOUS at 11:40

## 2019-12-18 RX ADMIN — Medication 100 MILLIGRAM(S): at 13:28

## 2019-12-18 RX ADMIN — ONDANSETRON 4 MILLIGRAM(S): 8 TABLET, FILM COATED ORAL at 17:15

## 2019-12-18 RX ADMIN — Medication 30 MILLILITER(S): at 17:15

## 2019-12-18 RX ADMIN — LOSARTAN POTASSIUM 25 MILLIGRAM(S): 100 TABLET, FILM COATED ORAL at 06:31

## 2019-12-18 NOTE — PROGRESS NOTE ADULT - ATTENDING COMMENTS
patient seen and examined with PA student Paloma Dow. I was physically present for the key portions of the evaluation and management (E/M) service provided.  I agree with the above history, physical, and plan which I have reviewed and edited where appropriate.  - plan for GI f/u with dr sheikh  - will dc on laxatives tomorrow if continues to improve

## 2019-12-18 NOTE — PROGRESS NOTE ADULT - ASSESSMENT
a/p:    # Stercoral colitis:  s/p multiple BMs, intermittent llq pain  c/w Cipro/Flagyl IV  IV hydration   analgesia prn  GI and surgical evaluations noted   on last admission she was offered surgery to remove large fecalith  noted during colonoscopy   c/w mineral oil, c/w miralax     #hypothyroidism  -Continue Synthroid    #HTN  -Continue Losartan     #DVT Px  -Continue Lovenox a/p:    # Stercoral colitis:  s/p multiple BMs, intermittent llq pain  c/w Cipro/Flagyl IV  IV hydration   analgesia prn  GI and surgical evaluations noted - pt aware of need for possible colonic dilation vs surgery. pt has outpt appt with GI - dr sheikh   on last admission she was offered surgery to remove large fecalith  noted during colonoscopy   c/w mineral oil, c/w miralax     #hypothyroidism  -Continue Synthroid    #HTN  -Continue Losartan     #DVT Px  -Continue Lovenox

## 2019-12-18 NOTE — PROGRESS NOTE ADULT - SUBJECTIVE AND OBJECTIVE BOX
History of Present Illness: 	  59 year old female patient previously discharged after inpatient admission for stercoral colitis presented back to the ED within 48hrs complaining of severe abdominal pain associated with nausea, anorexia and constipation. Patient states she has not had a bowel movement since given the bowel prep for her last colonoscopy on 12/12/19. Patient describes a diffuse, sharp abdominal pain with no clear aggravating or alleviating factors. Patient was not able to see GI Specialist(Dr Durant) for possible bowel dilation procedure. Patient still taking cipro/flagyl. Denies any fever, chills, vomiting, chest discomfort, bloody bowel movements or diarrhea. Patient still passing flatus.      In the ED patient had a repeat CT abdomen, which revealed Persistent stercoral colitis involving the sigmoid colon. Large amount of   stool throughout the colon, mildly decreased compared with December 09, 2019.    12.16: seen at 11am, s/p large BM, feeling much better, no abd pain  -called to see pt again at 4pm, c/o recurrent LLQ abd pain, severe 8/10 intensity   12.17: seen at 11am, feeling well, had multiple BMs  12/18 Patient seen and examined at bedside. No reportable events overnight. Patient reports no new pains.   \  REVIEW OF SYSTEMS:    CONSTITUTIONAL: No weakness, No fevers or chills  ENT: No ear ache, No sorethroat  NECK: No pain, No stiffness  RESPIRATORY: No cough, No wheezing, No hemoptysis; No dyspnea  CARDIOVASCULAR: No chest pain, No palpitations  GASTROINTESTINAL: ++abd pain, No nausea, No vomiting, No hematemesis, No diarrhea or constipation. No melena, No hematochezia.  GENITOURINARY: No dysuria, No  hematuria  NEUROLOGICAL: No diplopia, No paresthesia, No motor dysfunction  MUSCULOSKELETAL: No arthralgia, No myalgia  SKIN: No rashes, or lesions   PSYCH: no anxiety, no suicidal ideation    All other review of systems is negative unless indicated above    Vital Signs Last 24 Hrs  T(C): 37.1 (18 Dec 2019 11:33), Max: 37.1 (17 Dec 2019 21:10)  T(F): 98.7 (18 Dec 2019 11:33), Max: 98.8 (17 Dec 2019 21:10)  HR: 64 (18 Dec 2019 11:33) (55 - 64)  BP: 132/78 (18 Dec 2019 11:33) (104/69 - 132/78)  BP(mean): --  RR: 16 (18 Dec 2019 11:33) (16 - 17)  SpO2: 100% (18 Dec 2019 11:33) (98% - 100%)    PHYSICAL EXAM:    GENERAL: NAD, Well nourished  HEENT:  NC/AT, EOMI, PERRLA, No scleral icterus, Moist mucous membranes  NECK: Supple, No JVD  CNS:  Alert & Oriented X3, Motor Strength 5/5 B/L upper and lower extremities; DTRs 2+ intact   LUNG: Normal Breath sounds, Clear to auscultation bilaterally, No rales, No rhonchi, No wheezing  HEART: RRR; No murmurs, No rubs  ABDOMEN: +BS, ST, ND, +TTP to LLQ, +guarding   GENITOURINARY: Voiding, Bladder not distended  EXTREMITIES:  2+ Peripheral Pulses, No clubbing, No cyanosis, No tibial edema  MUSCULOSKELTAL: Joints normal ROM, No TTP, No effusion  VAGINAL: deferred  SKIN: no rashes  RECTAL: deferred, not indicated  BREAST: deferred      LABS:                        11.5   6.14  )-----------( 388      ( 17 Dec 2019 06:34 )             34.3     12-17    144  |  110<H>  |  3<L>  ----------------------------<  113<H>  3.8   |  27  |  0.54    Ca    8.4<L>      17 Dec 2019 06:34  Phos  3.7     12-16  Mg     2.0     12-16    RECENT CULTURES:  Culture - Urine (12.15.19 @ 22:00)    Specimen Source: .Urine Clean Catch (Midstream)    Culture Results:   <10,000 CFU/mL Normal Urogenital Tiffani        RADIOLOGY & ADDITIONAL TESTS:  < from: CT Abdomen and Pelvis w/ Oral Cont (12.15.19 @ 15:12) >  IMPRESSION:   Persistent stercoral colitis involving the sigmoid colon. Large amount of   stool throughout the colon, mildly decreased compared with December 09, 2019.    < from: Xray Chest 2 Views PA/Lat (12.15.19 @ 15:05) >  Impression: No evidence for acute pulmonary infiltrate, pleural effusion,   or pneumothorax.   The cardiac silhouette is within normal limits.   The trachea is midline.   There is no pulmonary vascular congestion .  No evidence for free air under the diaphragm.  Stable orthopedic hardware at the lower cervical spine.  New neurostimulator at the thoracolumbar spine.      MEDICATIONS  (STANDING):  cholecalciferol 1000 Unit(s) Oral daily  ciprofloxacin   IVPB      ciprofloxacin   IVPB 200 milliGRAM(s) IV Intermittent every 12 hours  enoxaparin Injectable 40 milliGRAM(s) SubCutaneous daily  levothyroxine 100 MICROGram(s) Oral daily  losartan 25 milliGRAM(s) Oral daily  metroNIDAZOLE  IVPB 500 milliGRAM(s) IV Intermittent every 8 hours  mineral oil 30 milliLiter(s) Oral two times a day  nystatin    Suspension 814250 Unit(s) Oral four times a day  ondansetron Injectable 4 milliGRAM(s) IV Push every 6 hours  pantoprazole    Tablet 40 milliGRAM(s) Oral before breakfast  polyethylene glycol 3350 17 Gram(s) Oral every 12 hours  sodium chloride 0.9%. 1000 milliLiter(s) (100 mL/Hr) IV Continuous <Continuous>    MEDICATIONS  (PRN):  acetaminophen   Tablet .. 650 milliGRAM(s) Oral every 6 hours PRN Temp greater or equal to 38C (100.4F), Mild Pain (1 - 3), Moderate Pain (4 - 6)  morphine  - Injectable 4 milliGRAM(s) IV Push every 4 hours PRN Severe Pain (7 - 10)  zolpidem 5 milliGRAM(s) Oral at bedtime PRN Insomnia

## 2019-12-18 NOTE — PROGRESS NOTE ADULT - ASSESSMENT
constipation associated with colitis.  Approximately a 7 cm segment of the sigmoid colon which is very edematous and swollen. This area was traversed with a colonoscope and initially was very tight however, appear to have become dilated more to the diameter of the colonoscope past it we traversed this region.  Large fecaliths noted above this area, largest one being probably about 5 x 3 cm and there was multiple other fecaliths above that area that were in the 3 cm range, fecalith was very firm, could not be broken apart with the water jet, biopsy forceps or snare.  I had an extensive discussion with the patient regarding options of therapy. Electrohydraulic lithotripsy, surgical intervention, attempting to dilate the area of stricturing after edema resolves and we have a better idea of what the underlying causes as well as if there is a stricture in that region  however, at this time, this region is too edematous to be assessed appropriately. I believe that dilating edematous tissue also would not be of benefit to her and I would not be able to see the possible underlying stricture that may be there is well.      I had a very long discussion with the patient regarding multiple other fecaliths being above the larger fecalith that she may have passed. I am unclear with this she passed a large fecalith or other smaller ones. The other ones appeared to be smaller.discussed with her options surgery to remove the stool, however, understand is, at this time, she would like to continue conservative management. She understands that she may require emergency surgery.  She will like to try to continue with MiraLAX and mineral oil enemas. Eventually, she may benefit from colonic dilation.  However, we also discussed the fact that she may eventually require surgery either way.  we again discussed this extensively today and she would like to continue conservative management and understands risk of recurrence.  Surgical note appreciated.        continue laxatives, mineral oil, increased ambulation and activity.

## 2019-12-18 NOTE — PROGRESS NOTE ADULT - SUBJECTIVE AND OBJECTIVE BOX
Patient is a 59y old  Female who presents with a chief complaint of Intractable Abdominal pain/ Stercoral colitis (17 Dec 2019 15:10)      HPI:  59 year old female patient previously discharged after inpatient admission for stercoral colitis presented back to the ED complaining of severe abdominal pain associated with nausea, anorexia and constipation. Patient states she has not had a bowel movement since given the bowel prep for her last colonoscopy on 12/12/19. Patient describes a diffuse, sharp abdominal pain with no clear aggravating or alleviating factors. Patient was not able to see GI Specialist(Dr Durant) for possible bowel dilation procedure. Patient still taking cipro/flagyl. Denies any fever, chills, vomiting, chest discomfort, bloody bowel movements or diarrhea. Patient still passing flatus.      In the ED patient had a repeat CT abdomen, which revealed Persistent stercoral colitis involving the sigmoid colon. Large amount of   stool throughout the colon, mildly decreased compared with December 09, 2019. (15 Dec 2019 19:30)    patient passing loose stools. Negative nausea vomiting. Tolerating diet and feels hungry.  Has some mild left lower quadrant pain. However, feels that this is improving. Pain is crampy.      PAST MEDICAL & SURGICAL HISTORY:  Osteoporosis  Fracture: Lumbar 1  HLD (hyperlipidemia): diet controlled  Herniated nucleus pulposus, thoracic  Herniated nucleus pulposus, lumbar  HTN (hypertension)  Hashimoto's disease  Back pain  S/P insertion of spinal cord stimulator: 10/2017  S/P hip replacement, left: 2017  History of tonsillectomy  S/P arthroscopy of right knee: 7/2016 and done prior about 10 years ago  S/P cervical discectomy: 5/2013  S/P lumbar fusion: 7/2012 .3/ 2017  S/P hip replacement, right: 2012      MEDICATIONS  (STANDING):  cholecalciferol 1000 Unit(s) Oral daily  ciprofloxacin   IVPB      ciprofloxacin   IVPB 200 milliGRAM(s) IV Intermittent every 12 hours  enoxaparin Injectable 40 milliGRAM(s) SubCutaneous daily  levothyroxine 100 MICROGram(s) Oral daily  losartan 25 milliGRAM(s) Oral daily  metroNIDAZOLE  IVPB 500 milliGRAM(s) IV Intermittent every 8 hours  mineral oil 30 milliLiter(s) Oral two times a day  nystatin    Suspension 803627 Unit(s) Oral four times a day  ondansetron Injectable 4 milliGRAM(s) IV Push every 6 hours  pantoprazole    Tablet 40 milliGRAM(s) Oral before breakfast  polyethylene glycol 3350 17 Gram(s) Oral every 12 hours  sodium chloride 0.9%. 1000 milliLiter(s) (100 mL/Hr) IV Continuous <Continuous>    MEDICATIONS  (PRN):  acetaminophen   Tablet .. 650 milliGRAM(s) Oral every 6 hours PRN Temp greater or equal to 38C (100.4F), Mild Pain (1 - 3), Moderate Pain (4 - 6)  morphine  - Injectable 4 milliGRAM(s) IV Push every 4 hours PRN Severe Pain (7 - 10)  zolpidem 5 milliGRAM(s) Oral at bedtime PRN Insomnia      Allergies    adhesives (Rash; Blisters; Hives)  IV Contrast (Hives)  Lyrica (Other)  penicillin (Hives)    Intolerances        SOCIAL HISTORY:    FAMILY HISTORY:      REVIEW OF SYSTEMS:    CONSTITUTIONAL: No weakness, fevers or chills  EYES/ENT: No visual changes;  No vertigo or throat pain   NECK: No pain or stiffness  RESPIRATORY: No cough, wheezing, hemoptysis; No shortness of breath  CARDIOVASCULAR: No chest pain or palpitations  GENITOURINARY: No dysuria, frequency or hematuria  NEUROLOGICAL: No numbness or weakness  SKIN: No itching, burning, rashes, or lesions   All other review of systems is negative unless indicated above.    Vital Signs Last 24 Hrs  T(C): 36.9 (18 Dec 2019 05:57), Max: 37.1 (17 Dec 2019 21:10)  T(F): 98.5 (18 Dec 2019 05:57), Max: 98.8 (17 Dec 2019 21:10)  HR: 55 (18 Dec 2019 05:57) (55 - 68)  BP: 127/69 (18 Dec 2019 05:57) (104/69 - 127/69)  BP(mean): --  RR: 17 (18 Dec 2019 05:57) (16 - 17)  SpO2: 98% (18 Dec 2019 05:57) (98% - 100%)    PHYSICAL EXAM:    Constitutional: NAD, well-developed  HEENT: EOMI, throat clear  Neck: No LAD, supple  Respiratory: CTA and P  Cardiovascular: S1 and S2, RRR, no M  Gastrointestinal: BS+, soft, NT/ND, neg HSM,  Extremities: No peripheral edema, neg clubing, cyanosis  Vascular: 2+ peripheral pulses  Neurological: A/O x 3, no focal deficits  Psychiatric: Normal mood, normal affect  Skin: No rashes    LABS:  CBC Full  -  ( 17 Dec 2019 06:34 )  WBC Count : 6.14 K/uL  RBC Count : 3.66 M/uL  Hemoglobin : 11.5 g/dL  Hematocrit : 34.3 %  Platelet Count - Automated : 388 K/uL  Mean Cell Volume : 93.7 fl  Mean Cell Hemoglobin : 31.4 pg  Mean Cell Hemoglobin Concentration : 33.5 gm/dL  Auto Neutrophil # : x  Auto Lymphocyte # : x  Auto Monocyte # : x  Auto Eosinophil # : x  Auto Basophil # : x  Auto Neutrophil % : x  Auto Lymphocyte % : x  Auto Monocyte % : x  Auto Eosinophil % : x  Auto Basophil % : x    12-17    144  |  110<H>  |  3<L>  ----------------------------<  113<H>  3.8   |  27  |  0.54    Ca    8.4<L>      17 Dec 2019 06:34              RADIOLOGY & ADDITIONAL STUDIES:  < from: CT Abdomen and Pelvis w/ Oral Cont (12.15.19 @ 15:12) >  EXAM:  CT ABDOMEN AND PELVIS OC                            PROCEDURE DATE:  12/15/2019          INTERPRETATION:  CLINICAL INFORMATION: Abdominal pain.   Stercoral colitis    COMPARISON: 12.9.19    PROCEDURE:   CT of the Abdomen and Pelvis was performed without intravenous contrast.   Intravenous contrast: None.  Oral contrast: positive contrast was administered.  Sagittal and coronal reformats were performed.    FINDINGS:    LOWER CHEST: Coronary artery calcifications.    LIVER: Within normal limits.  BILE DUCTS: Normal caliber.  GALLBLADDER: Within normal limits.  SPLEEN: Within normal limits.  PANCREAS: Within normal limits.  ADRENALS: Within normal limits.  KIDNEYS/URETERS: Within normal limits.    Pelvis: Markedly limited evaluation of the lower pelvis due to the marked   artifact from the patient's bilateral hip arthroplasties. The uterus,   adnexa and urinary bladder not adequately evaluated.    BOWEL: No bowel obstruction. Appendix is normal. Large amount of stool   throughout the colon, mildly decreased since the prior examination. Wall   thickening of the sigmoid colon and pericolic fat stranding, mildly   improved.  PERITONEUM: No ascites.  VESSELS: Within normal limits.  RETROPERITONEUM/LYMPH NODES: No lymphadenopathy.    ABDOMINAL WALL: Right buttocks spinal stimulator.  BONES: No acute abnormality.  Bilateral hip arthroplasties,   post-operative changes in the lumbar spine and L2 vertebroplasty again   noted.    IMPRESSION:       Persistent stercoral colitis involving the sigmoid colon. Large amount of   stool throughout the colon, mildly decreased compared with December 09, 2019.                    NICHOLE MAST   This document has been electronically signed. Dec 15 2019  3:25PM          < end of copied text >

## 2019-12-19 ENCOUNTER — TRANSCRIPTION ENCOUNTER (OUTPATIENT)
Age: 59
End: 2019-12-19

## 2019-12-19 VITALS
DIASTOLIC BLOOD PRESSURE: 77 MMHG | OXYGEN SATURATION: 100 % | RESPIRATION RATE: 18 BRPM | TEMPERATURE: 98 F | SYSTOLIC BLOOD PRESSURE: 125 MMHG | HEART RATE: 77 BPM

## 2019-12-19 RX ORDER — CIPROFLOXACIN LACTATE 400MG/40ML
1 VIAL (ML) INTRAVENOUS
Qty: 4 | Refills: 0
Start: 2019-12-19 | End: 2019-12-20

## 2019-12-19 RX ORDER — POLYETHYLENE GLYCOL 3350 17 G/17G
17 POWDER, FOR SOLUTION ORAL
Qty: 255 | Refills: 0
Start: 2019-12-19 | End: 2020-01-02

## 2019-12-19 RX ORDER — SENNA PLUS 8.6 MG/1
2 TABLET ORAL
Qty: 60 | Refills: 0
Start: 2019-12-19

## 2019-12-19 RX ORDER — METRONIDAZOLE 500 MG
1 TABLET ORAL
Qty: 6 | Refills: 0
Start: 2019-12-19 | End: 2019-12-20

## 2019-12-19 RX ORDER — SENNA PLUS 8.6 MG/1
2 TABLET ORAL AT BEDTIME
Refills: 0 | Status: DISCONTINUED | OUTPATIENT
Start: 2019-12-19 | End: 2019-12-19

## 2019-12-19 RX ADMIN — Medication 100 MILLIGRAM(S): at 05:17

## 2019-12-19 RX ADMIN — ONDANSETRON 4 MILLIGRAM(S): 8 TABLET, FILM COATED ORAL at 12:15

## 2019-12-19 RX ADMIN — Medication 100 MICROGRAM(S): at 05:16

## 2019-12-19 RX ADMIN — ENOXAPARIN SODIUM 40 MILLIGRAM(S): 100 INJECTION SUBCUTANEOUS at 12:16

## 2019-12-19 RX ADMIN — Medication 100 MILLIGRAM(S): at 12:16

## 2019-12-19 RX ADMIN — Medication 400000 UNIT(S): at 12:17

## 2019-12-19 RX ADMIN — LOSARTAN POTASSIUM 25 MILLIGRAM(S): 100 TABLET, FILM COATED ORAL at 05:57

## 2019-12-19 RX ADMIN — Medication 1000 UNIT(S): at 12:17

## 2019-12-19 RX ADMIN — POLYETHYLENE GLYCOL 3350 17 GRAM(S): 17 POWDER, FOR SOLUTION ORAL at 05:58

## 2019-12-19 RX ADMIN — Medication 650 MILLIGRAM(S): at 06:09

## 2019-12-19 RX ADMIN — Medication 30 MILLILITER(S): at 05:58

## 2019-12-19 RX ADMIN — PANTOPRAZOLE SODIUM 40 MILLIGRAM(S): 20 TABLET, DELAYED RELEASE ORAL at 05:57

## 2019-12-19 NOTE — PROGRESS NOTE ADULT - REASON FOR ADMISSION
Intractable Abdominal pain/ Stercoral colitis

## 2019-12-19 NOTE — PROGRESS NOTE ADULT - ASSESSMENT
constipation associated with colitis.  Approximately a 7 cm segment of the sigmoid colon which is very edematous and swollen. This area was traversed with a colonoscope and initially was very tight however, appear to have become dilated more to the diameter of the colonoscope past it we traversed this region.  Large fecaliths noted above this area, largest one being probably about 5 x 3 cm and there was multiple other fecaliths above that area that were in the 3 cm range, fecalith was very firm, could not be broken apart with the water jet, biopsy forceps or snare.  I had an discussion with the patient regarding options of therapy. Electrohydraulic lithotripsy, surgical intervention, attempting to dilate the area of stricturing after edema resolves and we have a better idea of what the underlying causes as well as if there is a stricture in that region  however, at this time, this region is too edematous to be assessed appropriately. or dilated I believe that dilating edematous tissue also would not be of benefit to her and I would not be able to see the possible underlying stricture that may be there is well.      I had a discussion with the patient regarding multiple other fecaliths being above the larger fecalith that she may have passed. I am unclear with this she passed a large fecalith or other smaller ones. The other ones appeared to be smaller.discussed with her options surgery to remove the stool, however, understand is, at this time, she would like to continue conservative management. She understands that she may require emergency surgery.  She will like to try to continue with MiraLAX and mineral oil enemas. Eventually, she may benefit from colonic dilation.  However, we also discussed the fact that she may eventually require surgery either way.  we again discussed this extensively today and she would like to continue conservative management and understands risk of recurrence.  Surgical note appreciated.        continue laxatives, mineral oil, increased ambulation and activity.  add [place] HWANG with DR Cely Durant as out pt

## 2019-12-19 NOTE — DISCHARGE NOTE PROVIDER - PROVIDER TOKENS
PROVIDER:[TOKEN:[[id number]]],PROVIDER:[TOKEN:[[id number]]],PROVIDER:[TOKEN:[[id number]]]

## 2019-12-19 NOTE — DISCHARGE NOTE NURSING/CASE MANAGEMENT/SOCIAL WORK - PATIENT PORTAL LINK FT
You can access the FollowMyHealth Patient Portal offered by [facility] by registering at the following website: [email or web address]. By joining [facility]’s FollowMyHealth portal, you will also be able to view your health information using other applications (apps) compatible with our system.

## 2019-12-19 NOTE — DISCHARGE NOTE PROVIDER - NSDCCPCAREPLAN_GEN_ALL_CORE_FT
PRINCIPAL DISCHARGE DIAGNOSIS  Diagnosis: Colitis  Assessment and Plan of Treatment:       SECONDARY DISCHARGE DIAGNOSES  Diagnosis: Fecal impaction of colon  Assessment and Plan of Treatment:

## 2019-12-19 NOTE — DISCHARGE NOTE PROVIDER - NSDCMRMEDTOKEN_GEN_ALL_CORE_FT
ciprofloxacin 500 mg oral tablet: 1 tab(s) orally every 12 hours  Flagyl 500 mg oral tablet: 1 tab(s) orally 3 times a day  levothyroxine 100 mcg (0.1 mg) oral tablet: 1 tab(s) orally once a day  losartan 25 mg oral tablet: 1 tab(s) orally once a day  mineral oil oral liquid: 30 milliliter(s) orally 2 times a day  nabumetone 750 mg oral tablet: 1 tab(s) orally 2 times a day as needed   nystatin 100,000 units/mL oral suspension: 4 milliliter(s) orally 4 times a day  pantoprazole 40 mg oral delayed release tablet: 1 tab(s) orally once a day  polyethylene glycol 3350 oral powder for reconstitution: 17 gram(s) orally every 12 hours  traMADol 50 mg oral tablet: 1-2 tablets twice daily in the morning and afternoon  Vitamin D3 1000 intl units oral capsule: 1 cap(s) orally once a day (in the evening) ciprofloxacin 500 mg oral tablet: 1 tab(s) orally every 12 hours  Flagyl 500 mg oral tablet: 1 tab(s) orally 3 times a day  levothyroxine 100 mcg (0.1 mg) oral tablet: 1 tab(s) orally once a day  losartan 25 mg oral tablet: 1 tab(s) orally once a day  mineral oil oral liquid: 30 milliliter(s) orally 2 times a day  nabumetone 750 mg oral tablet: 1 tab(s) orally 2 times a day as needed   nystatin 100,000 units/mL oral suspension: 4 milliliter(s) orally 4 times a day  pantoprazole 40 mg oral delayed release tablet: 1 tab(s) orally once a day  polyethylene glycol 3350 oral powder for reconstitution: 17 gram(s) orally every 12 hours  senna oral tablet: 2 tab(s) orally once a day (at bedtime)  traMADol 50 mg oral tablet: 1-2 tablets twice daily in the morning and afternoon  Vitamin D3 1000 intl units oral capsule: 1 cap(s) orally once a day (in the evening)

## 2019-12-19 NOTE — DISCHARGE NOTE PROVIDER - CARE PROVIDERS DIRECT ADDRESSES
,[email or web address],DirectAddress_Unknown,DirectAddress_Unknown

## 2019-12-19 NOTE — PROGRESS NOTE ADULT - SUBJECTIVE AND OBJECTIVE BOX
Patient is a 59y old  Female who presents with a chief complaint of Intractable Abdominal pain/ Stercoral colitis (18 Dec 2019 15:18)      HPI:  59 year old female patient previously discharged after inpatient admission for stercoral colitis presented back to the ED complaining of severe abdominal pain associated with nausea, anorexia and constipation. Patient states she has not had a bowel movement since given the bowel prep for her last colonoscopy on 12/12/19. Patient describes a diffuse, sharp abdominal pain with no clear aggravating or alleviating factors. Patient was not able to see GI Specialist(Dr Durant) for possible bowel dilation procedure. Patient still taking cipro/flagyl. Denies any fever, chills, vomiting, chest discomfort, bloody bowel movements or diarrhea. Patient still passing flatus.      In the ED patient had a repeat CT abdomen, which revealed Persistent stercoral colitis involving the sigmoid colon. Large amount of   stool throughout the colon, mildly decreased compared with December 09, 2019. (15 Dec 2019 19:30)    feels that her pain is getting significantly improved. Some mild pain and left lower quadrants crescendo decrescendo.  No bowel movements yesterday but has been having flatus.  Would like to go home.        PAST MEDICAL & SURGICAL HISTORY:  Osteoporosis  Fracture: Lumbar 1  HLD (hyperlipidemia): diet controlled  Herniated nucleus pulposus, thoracic  Herniated nucleus pulposus, lumbar  HTN (hypertension)  Hashimoto's disease  Back pain  S/P insertion of spinal cord stimulator: 10/2017  S/P hip replacement, left: 2017  History of tonsillectomy  S/P arthroscopy of right knee: 7/2016 and done prior about 10 years ago  S/P cervical discectomy: 5/2013  S/P lumbar fusion: 7/2012 .3/ 2017  S/P hip replacement, right: 2012      MEDICATIONS  (STANDING):  cholecalciferol 1000 Unit(s) Oral daily  ciprofloxacin   IVPB      ciprofloxacin   IVPB 200 milliGRAM(s) IV Intermittent every 12 hours  enoxaparin Injectable 40 milliGRAM(s) SubCutaneous daily  levothyroxine 100 MICROGram(s) Oral daily  losartan 25 milliGRAM(s) Oral daily  metroNIDAZOLE  IVPB 500 milliGRAM(s) IV Intermittent every 8 hours  mineral oil 30 milliLiter(s) Oral two times a day  nystatin    Suspension 348672 Unit(s) Oral four times a day  ondansetron Injectable 4 milliGRAM(s) IV Push every 6 hours  pantoprazole    Tablet 40 milliGRAM(s) Oral before breakfast  polyethylene glycol 3350 17 Gram(s) Oral every 12 hours  senna 2 Tablet(s) Oral at bedtime  sodium chloride 0.9%. 1000 milliLiter(s) (100 mL/Hr) IV Continuous <Continuous>    MEDICATIONS  (PRN):  acetaminophen   Tablet .. 650 milliGRAM(s) Oral every 6 hours PRN Temp greater or equal to 38C (100.4F), Mild Pain (1 - 3), Moderate Pain (4 - 6)  morphine  - Injectable 4 milliGRAM(s) IV Push every 4 hours PRN Severe Pain (7 - 10)  zolpidem 5 milliGRAM(s) Oral at bedtime PRN Insomnia      Allergies    adhesives (Rash; Blisters; Hives)  IV Contrast (Hives)  Lyrica (Other)  penicillin (Hives)    Intolerances        SOCIAL HISTORY:NC    FAMILY HISTORY:NC      REVIEW OF SYSTEMS:    CONSTITUTIONAL: No weakness, fevers or chills  EYES/ENT: No visual changes;  No vertigo or throat pain   NECK: No pain or stiffness  RESPIRATORY: No cough, wheezing, hemoptysis; No shortness of breath  CARDIOVASCULAR: No chest pain or palpitations  GENITOURINARY: No dysuria, frequency or hematuria  NEUROLOGICAL: No numbness or weakness  SKIN: No itching, burning, rashes, or lesions   All other review of systems is negative unless indicated above.    Vital Signs Last 24 Hrs  T(C): 36.4 (19 Dec 2019 05:33), Max: 37.1 (18 Dec 2019 11:33)  T(F): 97.5 (19 Dec 2019 05:33), Max: 98.7 (18 Dec 2019 11:33)  HR: 65 (19 Dec 2019 05:33) (61 - 65)  BP: 144/81 (19 Dec 2019 05:33) (132/78 - 152/69)  BP(mean): --  RR: 18 (19 Dec 2019 05:33) (16 - 18)  SpO2: 100% (19 Dec 2019 05:33) (100% - 100%)    PHYSICAL EXAM:    Constitutional: NAD, well-developed  HEENT: EOMI, throat clear  Neck: No LAD, supple  Respiratory: CTA and P  Cardiovascular: S1 and S2, RRR, no M  Gastrointestinal: BS+, soft, mild LLQ tend/ND, neg HSM,  Extremities: No peripheral edema, neg clubing, cyanosis  Vascular: 2+ peripheral pulses  Neurological: A/O x 3, no focal deficits  Psychiatric: Normal mood, normal affect  Skin: No rashes    LABS:    reviewed              RADIOLOGY & ADDITIONAL STUDIES:

## 2019-12-23 DIAGNOSIS — K52.9 NONINFECTIVE GASTROENTERITIS AND COLITIS, UNSPECIFIED: ICD-10-CM

## 2019-12-23 DIAGNOSIS — K56.41 FECAL IMPACTION: ICD-10-CM

## 2019-12-23 DIAGNOSIS — E43 UNSPECIFIED SEVERE PROTEIN-CALORIE MALNUTRITION: ICD-10-CM

## 2019-12-23 DIAGNOSIS — E06.3 AUTOIMMUNE THYROIDITIS: ICD-10-CM

## 2019-12-23 DIAGNOSIS — M81.0 AGE-RELATED OSTEOPOROSIS WITHOUT CURRENT PATHOLOGICAL FRACTURE: ICD-10-CM

## 2019-12-23 DIAGNOSIS — I10 ESSENTIAL (PRIMARY) HYPERTENSION: ICD-10-CM

## 2019-12-23 DIAGNOSIS — Z96.641 PRESENCE OF RIGHT ARTIFICIAL HIP JOINT: ICD-10-CM

## 2019-12-23 DIAGNOSIS — E78.5 HYPERLIPIDEMIA, UNSPECIFIED: ICD-10-CM

## 2020-03-08 NOTE — ED STATDOCS - CARE PLAN
[Educated Patient & Family about Diagnosis] : educated the patient and family about the diagnosis
Principal Discharge DX:	Colitis  Secondary Diagnosis:	Fecal impaction of colon

## 2020-03-23 NOTE — PATIENT PROFILE ADULT - HAS THE PATIENT RECEIVED THE INFLUENZA VACCINE THIS SEASON?
Chronic and stable problem.  Continue follow-up with cardiology annually.  Continue metoprolol tartrate 25 mg twice daily and Pradaxa 150 mg twice daily.   yes...

## 2020-12-16 NOTE — CONSULT NOTE ADULT - CONSULT REQUESTED DATE/TIME
A form was dropped off that the patient needs you to fill out and fax to Pamela Palacio. The fax number is on the form. The form is for a pre-op testing request.  Please fill out as soon as possible. 14-Mar-2017 20:00

## 2021-09-13 NOTE — DISCHARGE NOTE PROVIDER - NSDCQMERRANDS_GEN_ALL_CORE
Can you get some additional info? Is the patient calling? Or is this trotter notifying me of something? Can we get a copy of the MRI? No

## 2022-09-27 ENCOUNTER — NON-APPOINTMENT (OUTPATIENT)
Age: 62
End: 2022-09-27

## 2022-10-03 ENCOUNTER — NON-APPOINTMENT (OUTPATIENT)
Age: 62
End: 2022-10-03

## 2022-10-05 ENCOUNTER — APPOINTMENT (OUTPATIENT)
Dept: NEUROSURGERY | Facility: CLINIC | Age: 62
End: 2022-10-05

## 2022-10-05 VITALS
HEIGHT: 66 IN | HEART RATE: 76 BPM | OXYGEN SATURATION: 99 % | TEMPERATURE: 97.6 F | BODY MASS INDEX: 25.23 KG/M2 | SYSTOLIC BLOOD PRESSURE: 145 MMHG | DIASTOLIC BLOOD PRESSURE: 85 MMHG | WEIGHT: 157 LBS

## 2022-10-05 DIAGNOSIS — M62.838 OTHER MUSCLE SPASM: ICD-10-CM

## 2022-10-05 PROCEDURE — 99204 OFFICE O/P NEW MOD 45 MIN: CPT

## 2022-10-05 NOTE — CONSULT LETTER
[Dear  ___] : Dear  [unfilled], [Courtesy Letter:] : I had the pleasure of seeing your patient, [unfilled], in my office today. [Sincerely,] : Sincerely, [FreeTextEntry2] : Babita Beck MD\par [address]  [address]\[place] [address]\par  [FreeTextEntry1] : Ms. Quezada is a very pleasant 62-year-old female patient who was seen in our office today in regards to left-sided neck pain.\par \par The patient endorses a sudden onset of left-sided neck pain after her dog jumped onto her lap and chest.  The patient states that this event occurred in February 2022.  Since then, the patient has had difficulty turning her head to the left but not the right.  The patient's pain is fairly constant and is localized to the mid neck radiating into the left shoulder.  The patient has a remote history of an anterior cervical decompression and fusion from C4-C7 in 2013.  The patient has had episodes similar to this in the past which can last several weeks but are usually self-limiting.  The patient denies any radicular symptoms into the upper extremities into either hand.\par \par The patient's past medical history is significant for hypertension, thyroid dysfunction, and depression secondary to chronic pain.  The patient's current medical regimen includes levothyroxine, escitalopram, valsartan, hydrochlorothiazide, and hydrocodone.  The patient endorses allergies to penicillin, Lyrica, surgical tape, and contrast dye.\par \par On examination, the patient is alert, oriented, and compliant with the exam.  The patient demonstrates full strength in the upper extremities bilaterally.  The patient does not demonstrate a Antonino sign in either hand.  The patient has a limited range of motion of the cervical spine and is able to turn her neck to the right but has significant pain turning her neck to the left.  The patient has very tense musculature in the scalene and trapezius muscles on the left but not the right.\par \par The patient is accompanied with an MRI scan of the cervical spine dated May 16, 2022.  These images demonstrate the patient's previous surgical intervention from C4-C7 without obvious nerve root or spinal cord compression.  There is evidence of adjacent segment degeneration above and below her previous fusion but again, without any obvious nerve root or spinal cord compression.\par \par Taken together, the patient has a clinical history and radiographic findings most consistent with a severe muscle spasm.  At this time, I explained to the patient that further imaging is required to rule out the possibility of treatable structural pathologies such as a nonunion or dynamic instability.  At this point, the patient does not have any point tenderness in the neck or clinical history suggestive of either of these problems.  However, I have recommended CT scans and flexion/extension x-rays of the cervical spine to rule out these issues.  We had a long conversation about the nature of muscle spasms and the fact that these muscle spasms can be exacerbated by disuse and guarding.  I explained to the patient that muscle spasms are best treated conservatively with physical therapy, massage therapy, and muscle relaxants.  The patient has been provided a prescription for Valium to be used as needed.  The patient has been cautioned not to take her Valium with her hydrocodone.  At this time, we will be in contact with the patient with regards to her imaging findings and will likely recommend specific physical therapy and massage therapy in addition to medication treatment for the severe muscle spasm.  The patient will have regularly scheduled follow-up with us in approximately 2 months to reevaluate her progress and response to therapy. [FreeTextEntry3] : Jonah Enriquez MD, PhD, CS, FAANS Attending Neurosurgeon  of Neurosurgery [facility] [employer] [facility] at [facility] at [address], [place] [address] Office: [phone] Fax: [phone]

## 2022-10-13 RX ORDER — DIAZEPAM 5 MG/1
5 TABLET ORAL 3 TIMES DAILY
Qty: 9 | Refills: 0 | Status: ACTIVE | COMMUNITY
Start: 2022-10-05 | End: 1900-01-01

## 2022-10-17 ENCOUNTER — RESULT REVIEW (OUTPATIENT)
Age: 62
End: 2022-10-17

## 2022-10-17 ENCOUNTER — TRANSCRIPTION ENCOUNTER (OUTPATIENT)
Age: 62
End: 2022-10-17

## 2022-10-17 ENCOUNTER — APPOINTMENT (OUTPATIENT)
Dept: CT IMAGING | Facility: CLINIC | Age: 62
End: 2022-10-17

## 2022-10-17 ENCOUNTER — OUTPATIENT (OUTPATIENT)
Dept: OUTPATIENT SERVICES | Facility: HOSPITAL | Age: 62
LOS: 1 days | End: 2022-10-17
Payer: COMMERCIAL

## 2022-10-17 ENCOUNTER — APPOINTMENT (OUTPATIENT)
Dept: RADIOLOGY | Facility: CLINIC | Age: 62
End: 2022-10-17

## 2022-10-17 DIAGNOSIS — Z98.1 ARTHRODESIS STATUS: Chronic | ICD-10-CM

## 2022-10-17 DIAGNOSIS — Z96.641 PRESENCE OF RIGHT ARTIFICIAL HIP JOINT: Chronic | ICD-10-CM

## 2022-10-17 DIAGNOSIS — M62.838 OTHER MUSCLE SPASM: ICD-10-CM

## 2022-10-17 DIAGNOSIS — Z98.890 OTHER SPECIFIED POSTPROCEDURAL STATES: Chronic | ICD-10-CM

## 2022-10-17 DIAGNOSIS — Z96.642 PRESENCE OF LEFT ARTIFICIAL HIP JOINT: Chronic | ICD-10-CM

## 2022-10-17 DIAGNOSIS — Z90.89 ACQUIRED ABSENCE OF OTHER ORGANS: Chronic | ICD-10-CM

## 2022-10-17 PROCEDURE — 76376 3D RENDER W/INTRP POSTPROCES: CPT | Mod: 26

## 2022-10-17 PROCEDURE — 72052 X-RAY EXAM NECK SPINE 6/>VWS: CPT

## 2022-10-17 PROCEDURE — 72052 X-RAY EXAM NECK SPINE 6/>VWS: CPT | Mod: 26

## 2022-10-17 PROCEDURE — 72125 CT NECK SPINE W/O DYE: CPT

## 2022-10-17 PROCEDURE — 72125 CT NECK SPINE W/O DYE: CPT | Mod: 26

## 2022-10-17 PROCEDURE — 76376 3D RENDER W/INTRP POSTPROCES: CPT

## 2022-12-21 ENCOUNTER — EMERGENCY (EMERGENCY)
Facility: HOSPITAL | Age: 62
LOS: 0 days | Discharge: HOME | End: 2022-12-21
Attending: EMERGENCY MEDICINE
Payer: COMMERCIAL

## 2022-12-21 VITALS
HEART RATE: 63 BPM | OXYGEN SATURATION: 100 % | SYSTOLIC BLOOD PRESSURE: 107 MMHG | TEMPERATURE: 99 F | RESPIRATION RATE: 18 BRPM | DIASTOLIC BLOOD PRESSURE: 70 MMHG

## 2022-12-21 VITALS — HEIGHT: 63 IN | WEIGHT: 134.92 LBS

## 2022-12-21 DIAGNOSIS — R51.9 HEADACHE, UNSPECIFIED: ICD-10-CM
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
## 2022-12-21 DIAGNOSIS — Z98.890 OTHER SPECIFIED POSTPROCEDURAL STATES: Chronic | ICD-10-CM

## 2022-12-21 DIAGNOSIS — R22.0 LOCALIZED SWELLING, MASS AND LUMP, HEAD: ICD-10-CM

## 2022-12-21 DIAGNOSIS — S09.90XA UNSPECIFIED INJURY OF HEAD, INITIAL ENCOUNTER: ICD-10-CM

## 2022-12-21 DIAGNOSIS — M25.551 PAIN IN RIGHT HIP: ICD-10-CM

## 2022-12-21 DIAGNOSIS — M54.9 DORSALGIA, UNSPECIFIED: ICD-10-CM

## 2022-12-21 DIAGNOSIS — I10 ESSENTIAL (PRIMARY) HYPERTENSION: ICD-10-CM

## 2022-12-21 DIAGNOSIS — Z98.1 ARTHRODESIS STATUS: Chronic | ICD-10-CM
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
## 2022-12-21 DIAGNOSIS — E78.5 HYPERLIPIDEMIA, UNSPECIFIED: ICD-10-CM
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
## 2022-12-21 DIAGNOSIS — Z96.641 PRESENCE OF RIGHT ARTIFICIAL HIP JOINT: Chronic | ICD-10-CM

## 2022-12-21 DIAGNOSIS — Z96.642 PRESENCE OF LEFT ARTIFICIAL HIP JOINT: Chronic | ICD-10-CM

## 2022-12-21 DIAGNOSIS — G89.29 OTHER CHRONIC PAIN: ICD-10-CM

## 2022-12-21 DIAGNOSIS — S09.93XA UNSPECIFIED INJURY OF FACE, INITIAL ENCOUNTER: ICD-10-CM

## 2022-12-21 DIAGNOSIS — Z91.09 OTHER ALLERGY STATUS, OTHER THAN TO DRUGS AND BIOLOGICAL SUBSTANCES: ICD-10-CM

## 2022-12-21 DIAGNOSIS — Z88.0 ALLERGY STATUS TO PENICILLIN: ICD-10-CM

## 2022-12-21 DIAGNOSIS — Z88.6 ALLERGY STATUS TO ANALGESIC AGENT: ICD-10-CM

## 2022-12-21 DIAGNOSIS — Z96.643 PRESENCE OF ARTIFICIAL HIP JOINT, BILATERAL: ICD-10-CM

## 2022-12-21 DIAGNOSIS — E06.3 AUTOIMMUNE THYROIDITIS: ICD-10-CM

## 2022-12-21 DIAGNOSIS — M81.0 AGE-RELATED OSTEOPOROSIS WITHOUT CURRENT PATHOLOGICAL FRACTURE: ICD-10-CM
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
## 2022-12-21 DIAGNOSIS — Z90.89 ACQUIRED ABSENCE OF OTHER ORGANS: ICD-10-CM

## 2022-12-21 DIAGNOSIS — Z91.041 RADIOGRAPHIC DYE ALLERGY STATUS: ICD-10-CM

## 2022-12-21 DIAGNOSIS — Z90.89 ACQUIRED ABSENCE OF OTHER ORGANS: Chronic | ICD-10-CM

## 2022-12-21 PROCEDURE — 99284 EMERGENCY DEPT VISIT MOD MDM: CPT

## 2022-12-21 PROCEDURE — 72125 CT NECK SPINE W/O DYE: CPT | Mod: MA

## 2022-12-21 PROCEDURE — 73502 X-RAY EXAM HIP UNI 2-3 VIEWS: CPT | Mod: RT

## 2022-12-21 PROCEDURE — 70486 CT MAXILLOFACIAL W/O DYE: CPT | Mod: 26,MA

## 2022-12-21 PROCEDURE — 76376 3D RENDER W/INTRP POSTPROCES: CPT | Mod: 26

## 2022-12-21 PROCEDURE — 70450 CT HEAD/BRAIN W/O DYE: CPT | Mod: MA

## 2022-12-21 PROCEDURE — 72125 CT NECK SPINE W/O DYE: CPT | Mod: 26,MA

## 2022-12-21 PROCEDURE — 70450 CT HEAD/BRAIN W/O DYE: CPT | Mod: 26,MA

## 2022-12-21 PROCEDURE — 99285 EMERGENCY DEPT VISIT HI MDM: CPT

## 2022-12-21 PROCEDURE — 70486 CT MAXILLOFACIAL W/O DYE: CPT | Mod: MA

## 2022-12-21 PROCEDURE — 73502 X-RAY EXAM HIP UNI 2-3 VIEWS: CPT | Mod: 26,RT

## 2022-12-21 PROCEDURE — 76376 3D RENDER W/INTRP POSTPROCES: CPT

## 2022-12-21 NOTE — ED STATDOCS - ATTENDING APP SHARED VISIT CONTRIBUTION OF CARE
Total Number Of Fractions Rx 2: 10 I, Topher Feliciano MD, personally saw the patient with RICH.  I have personally performed a face to face diagnostic evaluation on this patient.  I have reviewed the RICH note and agree with the history, exam, and plan of care, except as noted.

## 2022-12-21 NOTE — ED STATDOCS - OBJECTIVE STATEMENT
63 y/o female with PMHx of b/l hip replacements, Hashimoto's, HTN, HLD, osteoporosis presents to the ED c/o right hip and right face pain and right face swelling s/p a fall while walking her dog after she got tripped up by the dog. States she hit her head and saw "cartoon stars" after wards. Pt states she has chronic back pain and hip pain and just wants to make sure shes not having a brain bleed. Pt is not on any blood thinners.

## 2022-12-21 NOTE — ED STATDOCS - NS_ ATTENDINGSCRIBEDETAILS _ED_A_ED_FT
I, Topher Feliciano MD,  performed the initial face to face bedside interview with this patient regarding history of present illness, review of symptoms and relevant past medical, social and family history.  I completed an independent physical examination.  I was the initial provider who evaluated this patient. I have signed out the follow up of any pending tests (i.e. labs, radiological studies) to the RICH.  I have communicated the patient’s plan of care and disposition with the RICH.  The history, relevant review of systems, past medical and surgical history, medical decision making, and physical examination was documented by the scribe in my presence and I attest to the accuracy of the documentation.

## 2022-12-21 NOTE — ED STATDOCS - PHYSICAL EXAMINATION
General: AAOx3, NAD  HEENT: Swelling of the right maxilla, no ecchymosis or abrasion, no signs of trauma, no mid-line neck tenderness  Cardiac: Normal rate and rhythym, no murmurs, normal peripheral perfusion  Respiratory: Normal rate and effort. CTAB  GI: Soft, nondistended, nontender  Neuro: No focal deficits. LIU equally x4, sensation to light touch intact throughout  MSK: FROMx4, no focal bony tenderness, no peripheral edema  Skin: No rash

## 2022-12-21 NOTE — ED STATDOCS - PATIENT PORTAL LINK FT
You can access the FollowMyHealth Patient Portal offered by [facility] by registering at the following website: [email or web address]. By joining [facility]’s FollowMyHealth portal, you will also be able to view your health information using other applications (apps) compatible with our system.

## 2022-12-21 NOTE — ED ADULT TRIAGE NOTE - CHIEF COMPLAINT QUOTE
pt s/p fall while walking her dog this morning at 7a. pt states the leash got tangled around her legs and she fell. +head strike. denies LOC or blood thinners. pt c/o headache, "seeing stars" and right hip pain. denies dizziness.

## 2022-12-21 NOTE — ED STATDOCS - PROGRESS NOTE DETAILS
61 yo female not on any AC presents with R sided head and facial injury s/p fall. Pt was walking her dog her pulled her and she fell on concrete. Pt was able to walk on her own but was also c/o of R hip pain. Pt states she was seeing stars when it happened and was worried that she has a bleed. Will check XR, CTs and reeval. -Michael Luna PA-C CTs negative. XR unremarkable. Will d/c home and offered pt concussion program to f/u with but pt denied at this time. -Michael VELASQUEZC

## 2022-12-21 NOTE — ED STATDOCS - NS ED ROS FT
Constitutional: No fevers, chills, or sweats.  HEENT: +Right face pain and swelling, +right hip pain  Cardiac: No chest pain, exertional dyspnea, orthopnea  Respiratory: No shortness of breath, no cough  GI: No abdominal pain, no N/V/D  Neuro: No headaches, no neck pain/stiffness, no numbness  All other systems reviewed and are negative unless otherwise stated in the HPI.

## 2023-04-05 NOTE — ED ADULT NURSE NOTE - EXTENSIONS OF SELF_ADULT
Photo Preface (Leave Blank If You Do Not Want): Photograph were obtained today
Detail Level: Simple
None

## 2023-06-13 NOTE — H&P PST ADULT - PSH
Return to the ER for worsening pain, vomiting, or ANY fever over 100.3.  Drink plenty of fluids. Strain your urine. Follow up with your family doctor and urologist. Take medications as directed.    
S/P arthroscopy of right knee  7/2016 and done prior about 10 years ago  S/P cervical discectomy  5/2013  S/P hip replacement, right  2012  S/P lumbar fusion  9/2012

## 2023-06-21 ENCOUNTER — OUTPATIENT (OUTPATIENT)
Dept: OUTPATIENT SERVICES | Facility: HOSPITAL | Age: 63
LOS: 1 days | End: 2023-06-21
Payer: COMMERCIAL

## 2023-06-21 DIAGNOSIS — Z98.890 OTHER SPECIFIED POSTPROCEDURAL STATES: Chronic | ICD-10-CM

## 2023-06-21 DIAGNOSIS — Z98.1 ARTHRODESIS STATUS: Chronic | ICD-10-CM

## 2023-06-21 DIAGNOSIS — Z96.642 PRESENCE OF LEFT ARTIFICIAL HIP JOINT: Chronic | ICD-10-CM

## 2023-06-21 DIAGNOSIS — Z98.51 TUBAL LIGATION STATUS: Chronic | ICD-10-CM

## 2023-06-21 DIAGNOSIS — Z96.641 PRESENCE OF RIGHT ARTIFICIAL HIP JOINT: Chronic | ICD-10-CM

## 2023-06-21 DIAGNOSIS — Z90.89 ACQUIRED ABSENCE OF OTHER ORGANS: Chronic | ICD-10-CM

## 2023-06-21 DIAGNOSIS — Z01.818 ENCOUNTER FOR OTHER PREPROCEDURAL EXAMINATION: ICD-10-CM

## 2023-06-21 LAB
A1C WITH ESTIMATED AVERAGE GLUCOSE RESULT: 5.7 % — HIGH (ref 4–5.6)
ADD ON TEST-SPECIMEN IN LAB: SIGNIFICANT CHANGE UP
ANION GAP SERPL CALC-SCNC: 3 MMOL/L — LOW (ref 5–17)
BASOPHILS # BLD AUTO: 0.05 K/UL — SIGNIFICANT CHANGE UP (ref 0–0.2)
BASOPHILS NFR BLD AUTO: 0.6 % — SIGNIFICANT CHANGE UP (ref 0–2)
BUN SERPL-MCNC: 12 MG/DL — SIGNIFICANT CHANGE UP (ref 7–23)
CALCIUM SERPL-MCNC: 9.4 MG/DL — SIGNIFICANT CHANGE UP (ref 8.5–10.1)
CHLORIDE SERPL-SCNC: 101 MMOL/L — SIGNIFICANT CHANGE UP (ref 96–108)
CO2 SERPL-SCNC: 31 MMOL/L — SIGNIFICANT CHANGE UP (ref 22–31)
CREAT SERPL-MCNC: 0.77 MG/DL — SIGNIFICANT CHANGE UP (ref 0.5–1.3)
EGFR: 88 ML/MIN/1.73M2 — SIGNIFICANT CHANGE UP
EOSINOPHIL # BLD AUTO: 0.15 K/UL — SIGNIFICANT CHANGE UP (ref 0–0.5)
EOSINOPHIL NFR BLD AUTO: 1.9 % — SIGNIFICANT CHANGE UP (ref 0–6)
ESTIMATED AVERAGE GLUCOSE: 117 MG/DL — HIGH (ref 68–114)
GLUCOSE SERPL-MCNC: 89 MG/DL — SIGNIFICANT CHANGE UP (ref 70–99)
HCT VFR BLD CALC: 37.3 % — SIGNIFICANT CHANGE UP (ref 34.5–45)
HGB BLD-MCNC: 12.8 G/DL — SIGNIFICANT CHANGE UP (ref 11.5–15.5)
IMM GRANULOCYTES NFR BLD AUTO: 0.2 % — SIGNIFICANT CHANGE UP (ref 0–0.9)
LYMPHOCYTES # BLD AUTO: 2.71 K/UL — SIGNIFICANT CHANGE UP (ref 1–3.3)
LYMPHOCYTES # BLD AUTO: 33.6 % — SIGNIFICANT CHANGE UP (ref 13–44)
MCHC RBC-ENTMCNC: 32.3 PG — SIGNIFICANT CHANGE UP (ref 27–34)
MCHC RBC-ENTMCNC: 34.3 GM/DL — SIGNIFICANT CHANGE UP (ref 32–36)
MCV RBC AUTO: 94.2 FL — SIGNIFICANT CHANGE UP (ref 80–100)
MONOCYTES # BLD AUTO: 0.68 K/UL — SIGNIFICANT CHANGE UP (ref 0–0.9)
MONOCYTES NFR BLD AUTO: 8.4 % — SIGNIFICANT CHANGE UP (ref 2–14)
MRSA PCR RESULT.: SIGNIFICANT CHANGE UP
NEUTROPHILS # BLD AUTO: 4.46 K/UL — SIGNIFICANT CHANGE UP (ref 1.8–7.4)
NEUTROPHILS NFR BLD AUTO: 55.3 % — SIGNIFICANT CHANGE UP (ref 43–77)
PLATELET # BLD AUTO: 346 K/UL — SIGNIFICANT CHANGE UP (ref 150–400)
POTASSIUM SERPL-MCNC: 3.8 MMOL/L — SIGNIFICANT CHANGE UP (ref 3.5–5.3)
POTASSIUM SERPL-SCNC: 3.8 MMOL/L — SIGNIFICANT CHANGE UP (ref 3.5–5.3)
RBC # BLD: 3.96 M/UL — SIGNIFICANT CHANGE UP (ref 3.8–5.2)
RBC # FLD: 13.3 % — SIGNIFICANT CHANGE UP (ref 10.3–14.5)
S AUREUS DNA NOSE QL NAA+PROBE: SIGNIFICANT CHANGE UP
SODIUM SERPL-SCNC: 135 MMOL/L — SIGNIFICANT CHANGE UP (ref 135–145)
WBC # BLD: 8.07 K/UL — SIGNIFICANT CHANGE UP (ref 3.8–10.5)
WBC # FLD AUTO: 8.07 K/UL — SIGNIFICANT CHANGE UP (ref 3.8–10.5)

## 2023-06-21 PROCEDURE — 36415 COLL VENOUS BLD VENIPUNCTURE: CPT

## 2023-06-21 PROCEDURE — 87640 STAPH A DNA AMP PROBE: CPT

## 2023-06-21 PROCEDURE — 80048 BASIC METABOLIC PNL TOTAL CA: CPT

## 2023-06-21 PROCEDURE — 99214 OFFICE O/P EST MOD 30 MIN: CPT | Mod: 25

## 2023-06-21 PROCEDURE — 83036 HEMOGLOBIN GLYCOSYLATED A1C: CPT

## 2023-06-21 PROCEDURE — 93010 ELECTROCARDIOGRAM REPORT: CPT

## 2023-06-21 PROCEDURE — 93005 ELECTROCARDIOGRAM TRACING: CPT

## 2023-06-21 PROCEDURE — 85025 COMPLETE CBC W/AUTO DIFF WBC: CPT

## 2023-06-21 PROCEDURE — 87641 MR-STAPH DNA AMP PROBE: CPT

## 2023-06-21 RX ORDER — TRAMADOL HYDROCHLORIDE 50 MG/1
1 TABLET ORAL
Qty: 0 | Refills: 0 | DISCHARGE

## 2023-06-21 RX ORDER — NABUMETONE 750 MG
1 TABLET ORAL
Qty: 0 | Refills: 0 | DISCHARGE

## 2023-06-21 NOTE — ASU PATIENT PROFILE, ADULT - NSICDXPASTSURGICALHX_GEN_ALL_CORE_FT
PAST SURGICAL HISTORY:  H/O eye surgery 11/2022 retinal tear    H/O tubal ligation     History of kyphoplasty     History of tonsillectomy     S/P arthroscopy of right knee 7/2016 and done prior about 10 years ago    S/P cervical discectomy 5/2013    S/P cervical spinal fusion 2013    S/P hip replacement, left 2017    S/P hip replacement, right 2012    S/P insertion of spinal cord stimulator 10/2017    S/P lumbar fusion 7/2012 .3/ 2017

## 2023-06-21 NOTE — ASU PATIENT PROFILE, ADULT - NSICDXPASTMEDICALHX_GEN_ALL_CORE_FT
PAST MEDICAL HISTORY:  Back pain     Fracture Lumbar 1    Hashimoto's disease     Herniated nucleus pulposus, lumbar     Herniated nucleus pulposus, thoracic     HLD (hyperlipidemia) diet controlled    HTN (hypertension)     Osteoporosis     Other depression     Retinal tear, left      PAST MEDICAL HISTORY:  Back pain     Constipation due to opioid therapy     Fracture Lumbar 1    Hashimoto's disease     Herniated nucleus pulposus, lumbar     Herniated nucleus pulposus, thoracic     HLD (hyperlipidemia) diet controlled    HTN (hypertension)     Lumbar spondylosis     Opiate dependence     Osteoporosis     Other depression     Post laminectomy syndrome     Prediabetes     Retinal tear, left

## 2023-06-21 NOTE — CHART NOTE - NSCHARTNOTEFT_GEN_A_CORE
63 year old female presents to PST for planned spinal cord stimulator generator replacement       Plan:  1. PST instructions given ; NPO status/  instructions to be given by ASU   2. Pt instructed to take following meds on day of surgery: levothyroxine esctaprolam valium   3. Pt instructed to take routine evening medications unless indicated   4. Stop NSAIDS ( Aspirin Alev Motrin Mobic Diclofenac), herbal supplements , MVI , Vitamin fish oil 7 days prior to surgery  unless   directed by surgeon or cardiologist;   5. Medical Optimization  with Dr Jeff   6. EZ wash instructions given & mupirocin instructions given  7. Labs EKG CXR as per surgeon request   8. Pt denies covid symptoms shortness of breath fever cough

## 2023-06-22 DIAGNOSIS — Z01.818 ENCOUNTER FOR OTHER PREPROCEDURAL EXAMINATION: ICD-10-CM

## 2023-06-23 RX ORDER — FENTANYL CITRATE 50 UG/ML
50 INJECTION INTRAVENOUS
Refills: 0 | Status: DISCONTINUED | OUTPATIENT
Start: 2023-06-26 | End: 2023-06-26

## 2023-06-23 RX ORDER — ONDANSETRON 8 MG/1
4 TABLET, FILM COATED ORAL ONCE
Refills: 0 | Status: DISCONTINUED | OUTPATIENT
Start: 2023-06-26 | End: 2023-06-26

## 2023-06-23 RX ORDER — SODIUM CHLORIDE 9 MG/ML
1000 INJECTION, SOLUTION INTRAVENOUS
Refills: 0 | Status: DISCONTINUED | OUTPATIENT
Start: 2023-06-26 | End: 2023-06-26

## 2023-06-23 RX ORDER — OXYCODONE HYDROCHLORIDE 5 MG/1
5 TABLET ORAL ONCE
Refills: 0 | Status: DISCONTINUED | OUTPATIENT
Start: 2023-06-26 | End: 2023-06-26

## 2023-06-26 ENCOUNTER — TRANSCRIPTION ENCOUNTER (OUTPATIENT)
Age: 63
End: 2023-06-26

## 2023-06-26 ENCOUNTER — OUTPATIENT (OUTPATIENT)
Dept: INPATIENT UNIT | Facility: HOSPITAL | Age: 63
LOS: 1 days | Discharge: HOME | End: 2023-06-26
Payer: COMMERCIAL

## 2023-06-26 VITALS
HEIGHT: 66 IN | SYSTOLIC BLOOD PRESSURE: 112 MMHG | RESPIRATION RATE: 14 BRPM | OXYGEN SATURATION: 100 % | HEART RATE: 57 BPM | WEIGHT: 153 LBS | TEMPERATURE: 98 F | DIASTOLIC BLOOD PRESSURE: 74 MMHG

## 2023-06-26 VITALS
TEMPERATURE: 98 F | SYSTOLIC BLOOD PRESSURE: 125 MMHG | RESPIRATION RATE: 16 BRPM | OXYGEN SATURATION: 100 % | DIASTOLIC BLOOD PRESSURE: 62 MMHG | HEART RATE: 73 BPM

## 2023-06-26 DIAGNOSIS — Z98.1 ARTHRODESIS STATUS: Chronic | ICD-10-CM

## 2023-06-26 DIAGNOSIS — Z98.890 OTHER SPECIFIED POSTPROCEDURAL STATES: Chronic | ICD-10-CM

## 2023-06-26 DIAGNOSIS — M96.1 POSTLAMINECTOMY SYNDROME, NOT ELSEWHERE CLASSIFIED: ICD-10-CM

## 2023-06-26 DIAGNOSIS — Z96.641 PRESENCE OF RIGHT ARTIFICIAL HIP JOINT: Chronic | ICD-10-CM

## 2023-06-26 DIAGNOSIS — Z98.51 TUBAL LIGATION STATUS: Chronic | ICD-10-CM

## 2023-06-26 DIAGNOSIS — Z90.89 ACQUIRED ABSENCE OF OTHER ORGANS: Chronic | ICD-10-CM

## 2023-06-26 DIAGNOSIS — Z96.642 PRESENCE OF LEFT ARTIFICIAL HIP JOINT: Chronic | ICD-10-CM

## 2023-06-26 DIAGNOSIS — M47.816 SPONDYLOSIS WITHOUT MYELOPATHY OR RADICULOPATHY, LUMBAR REGION: ICD-10-CM

## 2023-06-26 LAB — BLD GP AB SCN SERPL QL: SIGNIFICANT CHANGE UP

## 2023-06-26 PROCEDURE — 86850 RBC ANTIBODY SCREEN: CPT

## 2023-06-26 PROCEDURE — C1883: CPT

## 2023-06-26 PROCEDURE — C1820: CPT

## 2023-06-26 PROCEDURE — 76000 FLUOROSCOPY <1 HR PHYS/QHP: CPT

## 2023-06-26 PROCEDURE — 86900 BLOOD TYPING SEROLOGIC ABO: CPT

## 2023-06-26 PROCEDURE — 36415 COLL VENOUS BLD VENIPUNCTURE: CPT

## 2023-06-26 PROCEDURE — 86901 BLOOD TYPING SEROLOGIC RH(D): CPT

## 2023-06-26 PROCEDURE — C1787: CPT

## 2023-06-26 RX ORDER — HYDROCODONE BITARTRATE AND ACETAMINOPHEN 7.5; 325 MG/15ML; MG/15ML
1 SOLUTION ORAL
Refills: 0 | DISCHARGE

## 2023-06-26 RX ORDER — ESCITALOPRAM OXALATE 10 MG/1
1 TABLET, FILM COATED ORAL
Refills: 0 | DISCHARGE

## 2023-06-26 RX ORDER — SENNA PLUS 8.6 MG/1
2 TABLET ORAL
Refills: 0 | DISCHARGE

## 2023-06-26 RX ORDER — DIAZEPAM 5 MG
0.5 TABLET ORAL
Refills: 0 | DISCHARGE

## 2023-06-26 RX ORDER — CHOLECALCIFEROL (VITAMIN D3) 125 MCG
1 CAPSULE ORAL
Qty: 0 | Refills: 0 | DISCHARGE

## 2023-06-26 RX ORDER — LEVOTHYROXINE SODIUM 125 MCG
1 TABLET ORAL
Qty: 0 | Refills: 0 | DISCHARGE

## 2023-06-26 NOTE — ASU PATIENT PROFILE, ADULT - NSICDXPASTMEDICALHX_GEN_ALL_CORE_FT
PAST MEDICAL HISTORY:  Back pain     Constipation due to opioid therapy     Fracture Lumbar 1    Hashimoto's disease     Herniated nucleus pulposus, lumbar     Herniated nucleus pulposus, thoracic     HLD (hyperlipidemia) diet controlled    HTN (hypertension)     Lumbar spondylosis     Opiate dependence     Osteoporosis     Other depression     Post laminectomy syndrome     Prediabetes     Retinal tear, left

## 2023-06-26 NOTE — BRIEF OPERATIVE NOTE - NSICDXBRIEFPROCEDURE_GEN_ALL_CORE_FT
PROCEDURES:  Replacement of spinal pulse generator 26-Jun-2023 09:41:56  Ed Singer  Insertion of spinal cord stimulation generator 26-Jun-2023 09:42:19  Ed Singer

## 2023-06-26 NOTE — ASU DISCHARGE PLAN (ADULT/PEDIATRIC) - CARE PROVIDER_API CALL
Ed Singer  Pain Medicine  [address] [place] [address]  Phone: [phone]  Fax: [phone]  Established Patient  Follow Up Time:

## 2023-06-26 NOTE — ASU PATIENT PROFILE, ADULT - NUMBER OF YRS
10 Double Island Pedicle Flap Text: The defect edges were debeveled with a #15 scalpel blade.  Given the location of the defect, shape of the defect and the proximity to free margins a double island pedicle advancement flap was deemed most appropriate.  Using a sterile surgical marker, an appropriate advancement flap was drawn incorporating the defect, outlining the appropriate donor tissue and placing the expected incisions within the relaxed skin tension lines where possible.    The area thus outlined was incised deep to adipose tissue with a #15 scalpel blade.  The skin margins were undermined to an appropriate distance in all directions around the primary defect and laterally outward around the island pedicle utilizing iris scissors.  There was minimal undermining beneath the pedicle flap.

## 2023-06-30 DIAGNOSIS — T85.193A OTHER MECHANICAL COMPLICATION OF IMPLANTED ELECTRONIC NEUROSTIMULATOR, GENERATOR, INITIAL ENCOUNTER: ICD-10-CM

## 2023-06-30 DIAGNOSIS — M96.1 POSTLAMINECTOMY SYNDROME, NOT ELSEWHERE CLASSIFIED: ICD-10-CM
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
## 2023-06-30 DIAGNOSIS — E78.5 HYPERLIPIDEMIA, UNSPECIFIED: ICD-10-CM

## 2023-06-30 DIAGNOSIS — I10 ESSENTIAL (PRIMARY) HYPERTENSION: ICD-10-CM

## 2023-06-30 DIAGNOSIS — E03.9 HYPOTHYROIDISM, UNSPECIFIED: ICD-10-CM
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
## 2023-06-30 DIAGNOSIS — Z91.041 RADIOGRAPHIC DYE ALLERGY STATUS: ICD-10-CM

## 2023-06-30 DIAGNOSIS — Z88.0 ALLERGY STATUS TO PENICILLIN: ICD-10-CM

## 2023-06-30 DIAGNOSIS — Z96.643 PRESENCE OF ARTIFICIAL HIP JOINT, BILATERAL: ICD-10-CM

## 2023-06-30 DIAGNOSIS — M51.36 OTHER INTERVERTEBRAL DISC DEGENERATION, LUMBAR REGION: ICD-10-CM

## 2023-10-13 NOTE — CONSULT NOTE ADULT - ASSESSMENT
Rounded on pt, pt awake and restless, but calm and cooperative, asking for his Geodon, okay to give per ED MD, offered pt water, pt accepted, offered bathroom pt declined, will order breakfast when kitchen opens, VSS, public safety remains at bedside 60 y/o female with a PMHx of back pain, Hashimoto's, Herniated nucleus pulposus, HTN, HLD presents to the ED c/o constipation and incr abd pain for the last week since taking Imodium for diarrhea which started the weekend after Thanksgiving.   Pt c/o intermittent diarrhea, subjective fever. Denies urinary symptoms, n/v. Last colonoscopy 2 years ago resulting normal. Here afebrile, wbc ct 18, CT abd/pelvis stercoral colitis, was eval by GI given cipro/flagyl.     1. abdominal pain. stercoral colitis. leukocytosis  - bowel regimen, gi/gyn eval noted   - wbc ct likely reactive  - on cipro/flagyl  - f/u cultures   - monitor temps  - tolerating abx well so far; no side effects noted  - reason for abx use and side effects reviewed with patient  - supportive care  - fu cbc    2. other issues - care per medicine

## 2023-12-11 NOTE — BRIEF OPERATIVE NOTE - TYPE OF ANESTHESIA
Last OV: 2023    Next OV: 2023    Last Lab: 2023 routine labs    Last Fill:         ondansetron (ZOFRAN ODT) 4 MG disintegrating tablet 15 tablet 0 2023 --    Sig - Route: Place 1 tablet onto the tongue every 8 hours as needed for Nausea.      Tramadol HCl  50MG / Tablet  Rx# [id number] Opioid Qty: 120  Days: 30  Refills: 0 Prescribed: 10/24/2023  Dispensed: 10/24/2023  Sold: JERRY Wylie   [id number]  [address] [place] [address] EMERITA AVITIA  : [id number] [place] [address]  Pay Type: Commercial Insurance      Monitored Anesthesia Care with sedation

## 2024-01-16 NOTE — ED ADULT TRIAGE NOTE - PRO INTERPRETER NEED 2
Patient Name: Caesar Kim  : 1938    MRN: [id number]                              Today's Date: 2024       Admit Date: 2024    Visit Dx:     ICD-10-CM ICD-9-CM   1. Altered mental status, unspecified altered mental status type  R41.82 780.97   2. Acute UTI (urinary tract infection)  N39.0 599.0   3. Elevated CK  R74.8 790.5   4. Encephalopathy, unspecified  G93.40 348.30   5. Essential hypertension  I10 401.9     Patient Active Problem List   Diagnosis    Urinary tract infection    Muscle weakness    Encephalopathy, unspecified    Fall    Right hip pain    Essential hypertension    Atrial fibrillation    HLD (hyperlipidemia)    Seizure    BPH (benign prostatic hyperplasia)    GERD without esophagitis    Complicated UTI (urinary tract infection)     Past Medical History:   Diagnosis Date    AAA (abdominal aortic aneurysm)     Arthritis     Atrial fibrillation     BPH (benign prostatic hyperplasia)     CHF (congestive heart failure)     COPD (chronic obstructive pulmonary disease)     Diabetes mellitus     Elevated cholesterol     GERD (gastroesophageal reflux disease)     Hypertension     Memory loss     Personal history of pulmonary embolism     Pulmonary embolism     Restless leg syndrome     Sleep apnea     Uses CPAP    Unspecified convulsions     Unspecified diastolic (congestive) heart failure     Urinary tract infection      Past Surgical History:   Procedure Laterality Date    CHOLECYSTECTOMY      COLONOSCOPY      CYSTOSCOPY TRANSURETHRAL RESECTION OF PROSTATE N/A 2022    Procedure: CYSTOSCOPY TRANSURETHRAL RESECTION OF PROSTATE GREENLIGHT;  Surgeon: Darius Costa MD;  Location: [facility];  Service: Urology;  Laterality: N/A;    ENDOSCOPY      EYE SURGERY Bilateral     CATARACTS    SHOULDER SURGERY Left     SKIN BIOPSY      NECK    TONSILLECTOMY      AND ADENOIDS      General Information       Row Name 24 1048          OT Time and Intention    Document Type evaluation  -LIVIER      Mode of Treatment individual therapy;occupational therapy  -       Row Name 01/16/24 1048          General Information    Patient Profile Reviewed yes  -LIVIER     Prior Level of Function independent:;min assist:;feeding;grooming;mod assist:;bed mobility;transfer;max assist:;dependent:;w/c or scooter;dressing;bathing  wife assist pt. OOB to a w/c, rolled to sink, pt. stands sinkside and wife puts BSC behind pt., dependent toileting. Dep HM, home PT walks with pt. grossly 50ft with wx  -LIVIER     Existing Precautions/Restrictions fall;other (see comments);seizures  confusion, narrow SHANELLE  -LIVIER     Barriers to Rehab medically complex;previous functional deficit;cognitive status  -       Row Name 01/16/24 1048          Occupational Profile    Environmental Supports and Barriers (Occupational Profile) BSC, rw wx, w/c  -LIVIER       Row Name 01/16/24 1048          Living Environment    People in Home spouse  -       Row Name 01/16/24 1048          Home Main Entrance    Number of Stairs, Main Entrance two  -LIVIER     Stair Railings, Main Entrance railings safe and in good condition  per last admit note  -       Row Name 01/16/24 1048          Cognition    Orientation Status (Cognition) oriented to;person;disoriented to;place;situation;time  pt. thought he was at [facility], month February and could not ID yr with 3 choices, per wife pt. typically would not know these answers priorly  -       Row Name 01/16/24 1048          Safety Issues, Functional Mobility    Safety Issues Affecting Function (Mobility) ability to follow commands;awareness of need for assistance;insight into deficits/self-awareness;judgment;problem-solving;safety precaution awareness;safety precautions follow-through/compliance;sequencing abilities  -LIVIER     Impairments Affecting Function (Mobility) balance;cognition;coordination;endurance/activity tolerance;range of motion (ROM);strength;motor control  -LIVIER     Cognitive Impairments, Mobility  Safety/Performance awareness, need for assistance;insight into deficits/self-awareness;judgment;problem-solving/reasoning;safety precaution awareness;safety precaution follow-through;sequencing abilities  -               User Key  (r) = Recorded By, (t) = Taken By, (c) = Cosigned By      Initials Name Provider Type    Chayito Pinzon, OT Occupational Therapist                     Mobility/ADL's       Row Name 01/16/24 1055          Bed Mobility    Bed Mobility rolling left;rolling right  -LIVIER     Rolling Left [place] (Bed Mobility) moderate assist (50% patient effort);verbal cues;nonverbal cues (demo/gesture)  -LIVIER     Rolling Right [place] (Bed Mobility) moderate assist (50% patient effort);nonverbal cues (demo/gesture);verbal cues  -LIVIER     Supine-Sit [place] (Bed Mobility) maximum assist (25% patient effort);nonverbal cues (demo/gesture);verbal cues;1 person assist  -LIVIER     Sit-Supine [place] (Bed Mobility) maximum assist (25% patient effort);2 person assist;nonverbal cues (demo/gesture);verbal cues  -     Bed Mobility, Safety Issues cognitive deficits limit understanding;impaired trunk control for bed mobility  -     Assistive Device (Bed Mobility) bed rails;draw sheet;head of bed elevated  -LIVIER     Comment, (Bed Mobility) pt. needed step by step cues, tending to lean left and posteriorly, excess time to scoot, most assist with trunk OOB, pt. close to EOB on sitting so had to quickly return to supine, rolled to fix pads  -       Row Name 01/16/24 1055          Transfers    Transfers sit-stand transfer;stand-sit transfer  -LIVIER     Comment, (Transfers) setup recliner to attempt up, but pt.  unable to stand well enought to attempt transfer, two stands, pt. with narrow SHANELLE bringing LLE over to RLE, with attempts to seperate further pt. would  foot over therapist foot, foot block needed, pt. with posterior lean  -       Row Name 01/16/24 1055          Sit-Stand Transfer    Sit-Stand  [place] (Transfers) maximum assist (25% patient effort);2 person assist;nonverbal cues (demo/gesture);verbal cues  -LIVIER     Assistive Device (Sit-Stand Transfers) walker, front-wheeled  -LIVIER     Comment, (Sit-Stand Transfer) BUE support second stand  -LIVIER       Row Name 01/16/24 1055          Stand-Sit Transfer    Stand-Sit [place] (Transfers) maximum assist (25% patient effort);2 person assist  -LIVIER     Assistive Device (Stand-Sit Transfers) walker, front-wheeled;other (see comments)  -LIVIER     Comment, (Stand-Sit Transfer) vs UE support  -LIVIER       Row Name 01/16/24 1055          Functional Mobility    Functional Mobility- Ind. Level not appropriate to assess  -LIVIER       Row Name 01/16/24 1055          Activities of Daily Living    BADL Assessment/Intervention upper body dressing;grooming  -LIVIER       Row Name 01/16/24 1055          Upper Body Dressing Assessment/Training    [place] Level (Upper Body Dressing) doff;don;moderate assist (50% patient effort);verbal cues  -LIVIER     Position (Upper Body Dressing) edge of bed sitting  -LIVIER     Comment, (Upper Body Dressing) soiled gown changed out, pt. difficulty waiting for it to be moved around IV wanting to pull it off on and place on arm  -LIVIER       Row Name 01/16/24 1055          Grooming Assessment/Training    [place] Level (Grooming) hair care, combing/brushing;dependent (less than 25% patient effort);wash face, hands;minimum assist (75% patient effort);verbal cues  -LIVIER     Position (Grooming) supported sitting  -LIVIER               User Key  (r) = Recorded By, (t) = Taken By, (c) = Cosigned By      Initials Name Provider Type    Chayito Pinzon, OT Occupational Therapist                   Obj/Interventions       Row Name 01/16/24 1100          Sensory Assessment (Somatosensory)    Sensory Assessment (Somatosensory) unable/difficult to assess  -LIVIER     Sensory Assessment per wife pt. with some nueropathy in LE's, per pt. he has tingling, but no numbness, SEAN  formally, with object use BUE sensation appears WFL  -       Row Name 01/16/24 1100          Vision Assessment/Intervention    Visual Impairment/Limitations corrective lenses full-time  -       Row Name 01/16/24 1100          Range of Motion Comprehensive    Comment, General Range of Motion pt. with limited shoulder Flexion  -       Row Name 01/16/24 1100          Strength Comprehensive (MMT)    General Manual Muscle Testing (MMT) Assessment upper extremity strength deficits identified  -LIVIER     Comment, General Manual Muscle Testing (MMT) Assessment BUE grossly 4/5 to 4+/5  -       Row Name 01/16/24 1100          Motor Skills    Motor Skills functional endurance  -     Functional Endurance good -  -       Row Name 01/16/24 1100          Balance    Balance Assessment sitting static balance;sitting dynamic balance;standing static balance  -LIVIER     Static Sitting Balance minimal assist;verbal cues;non-verbal cues (demo/gesture)  -LIVIER     Dynamic Sitting Balance minimal assist;verbal cues;non-verbal cues (demo/gesture)  -LIVIER     Position, Sitting Balance unsupported;sitting edge of bed  -LIVIER     Static Standing Balance maximum assist;2-person assist;non-verbal cues (demo/gesture);verbal cues  -LIVIER     Position/Device Used, Standing Balance walker, front-wheeled;other (see comments)  BUE support  -LIVIER     Balance Interventions sit to stand;occupation based/functional task;weight shifting activity  -LIVIER     Comment, Balance UBD, grooming, sat EOB grossly 10 minutes, worked on weight shifting forward for feet on floor and to right for midline sit  -LIVIER               User Key  (r) = Recorded By, (t) = Taken By, (c) = Cosigned By      Initials Name Provider Type    Chayito Pinzon OT Occupational Therapist                   Goals/Plan       Row Name 01/16/24 1107          Bed Mobility Goal 1 (OT)    Activity/Assistive Device (Bed Mobility Goal 1, OT) rolling to left;rolling to right;supine to sit  -LIVIER      [place] Level/Cues Needed (Bed Mobility Goal 1, OT) moderate assist (50-74% patient effort);tactile cues required;verbal cues required  -LIVIER     Time Frame (Bed Mobility Goal 1, OT) long term goal (LTG);10 days  -LIVIER     Progress/Outcomes (Bed Mobility Goal 1, OT) new goal  -LIVIER       Row Name 01/16/24 1107          Transfer Goal 1 (OT)    Activity/Assistive Device (Transfer Goal 1, OT) sit-to-stand/stand-to-sit;bed-to-chair/chair-to-bed;commode, bedside without drop arms;toilet;walker, rolling  -LIVIER     [place] Level/Cues Needed (Transfer Goal 1, OT) moderate assist (50-74% patient effort);nonverbal cues (demo/gesture) required;verbal cues required  -LIVIER     Time Frame (Transfer Goal 1, OT) long term goal (LTG);10 days  -LIVIER     Progress/Outcome (Transfer Goal 1, OT) new goal  -LIVIER       Row Name 01/16/24 1107          Grooming Goal 1 (OT)    Activity/Device (Grooming Goal 1, OT) hair care  -LIVIER     [place] (Grooming Goal 1, OT) set-up required;standby assist  -LIVIER     Time Frame (Grooming Goal 1, OT) long term goal (LTG);10 days  -LIVIER     Progress/Outcome (Grooming Goal 1, OT) new goal  -       Row Name 01/16/24 1107          Problem Specific Goal 1 (OT)    Problem Specific Goal 1 (OT) Pt. will sit at EOB 5 minutes with CGA and stand one minute with min A to support ADL independence and caregiver care.  -LIVIER     Time Frame (Problem Specific Goal 1, OT) long term goal (LTG);10 days  -LIVIER     Progress/Outcome (Problem Specific Goal 1, OT) new goal  -LIVIER       Row Name 01/16/24 1109          Therapy Assessment/Plan (OT)    Planned Therapy Interventions (OT) activity tolerance training;BADL retraining;ROM/therapeutic exercise;transfer/mobility retraining;strengthening exercise;functional balance retraining;cognitive/visual perception retraining;occupation/activity based interventions;patient/caregiver education/training  -LIVIER               User Key  (r) = Recorded By, (t) = Taken By, (c) = Cosigned By      Initials  Name Provider Type    Chayito Pinzon, OT Occupational Therapist                   Clinical Impression       Row Name 01/16/24 1102          Pain Assessment    Pretreatment Pain Rating 0/10 - no pain  -LIVIER     Posttreatment Pain Rating 0/10 - no pain  -LIVIER       Row Name 01/16/24 1102          Plan of Care Review    Plan of Care Reviewed With patient;spouse  -LIVIER     Progress declining  -LIVIER     Outcome Evaluation Pt. needed increased assist with bed mobility and transfers today as compared to yesterday with PT evaluation.  Pt. with posterior and left lean with narrow SHANELLE on stand needing max A of 2 to stand.  Pt. presents with imparied strength, ROM, balance, cognition and endurance impacting PLOF ADLs and related transfers.  Typically wife can get pt. OOB to a w/c on own.  Pt. appropriate for skilled OT services to address deficit areas and promote return to PLOF ADLs and related transfers.  Recommend SNF at discharge.  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)  -     Rehab Potential (OT) fair, will monitor progress closely  -LIVIER     Criteria for Skilled Therapeutic Interventions Met (OT) yes;meets criteria;skilled treatment is necessary  -LIVIER     Therapy Frequency (OT) daily  -LIVIER       Row Name 01/16/24 1102          Therapy Plan Review/Discharge Plan (OT)    Anticipated Discharge Disposition (OT) skilled nursing facility  -LIVIER       Row Name 01/16/24 1102          Vital Signs    Pre Systolic BP Rehab --  nurse cleared for treatment session  -LIVIER     O2 Delivery Pre Treatment room air  -LIVIER     O2 Delivery Intra Treatment room air  -LIVIER     O2 Delivery Post Treatment room air  -LIVIER     Pre Patient Position Supine  -LIVIER     Intra Patient Position Standing  -LIVIER     Post Patient Position Supine  -LIVIER       Row Name 01/16/24 1102          Positioning and Restraints    Pre-Treatment Position in  bed  -LIVIER     Post Treatment Position bed  -LIVIER     In Bed notified nsg;supine;call light within reach;encouraged to call for assist;exit alarm on;with family/caregiver  rails up with seizure pads in place  -LIVIER               User Key  (r) = Recorded By, (t) = Taken By, (c) = Cosigned By      Initials Name Provider Type    Chayito Pinzon, OT Occupational Therapist                   Outcome Measures       Row Name 01/16/24 1109          How much help from another is currently needed...    Putting on and taking off regular lower body clothing? 1  -LIVIER     Bathing (including washing, rinsing, and drying) 1  -LIVIER     Toileting (which includes using toilet bed pan or urinal) 1  -LIVIER     Putting on and taking off regular upper body clothing 2  -LIVIER     Taking care of personal grooming (such as brushing teeth) 2  -LIVIER     Eating meals 3  -LIVIER     AM-PAC 6 Clicks Score (OT) 10  -LIVIER       Row Name 01/16/24 0800          How much help from another person do you currently need...    Turning from your back to your side while in flat bed without using bedrails? 2  -TK     Moving from lying on back to sitting on the side of a flat bed without bedrails? 2  -TK     Moving to and from a bed to a chair (including a wheelchair)? 2  -TK     Standing up from a chair using your arms (e.g., wheelchair, bedside chair)? 2  -TK     Climbing 3-5 steps with a railing? 1  -TK     To walk in hospital room? 1  -TK     AM-PAC 6 Clicks Score (PT) 10  -TK     Highest Level of Mobility Goal 4 --> Transfer to chair/commode  -TK       Row Name 01/16/24 1109          Functional Assessment    Outcome Measure Options AM-PAC 6 Clicks Daily Activity (OT)  -LIVIER               User Key  (r) = Recorded By, (t) = Taken By, (c) = Cosigned By      Initials Name Provider Type    Chayito Pinzon OT Occupational Therapist    Jayson Flores RN Registered Nurse                    Occupational Therapy Education       Title: PT OT SLP Therapies (In Progress)        Topic: Occupational Therapy (In Progress)       Point: ADL training (Done)       Description:   Instruct learner(s) on proper safety adaptation and remediation techniques during self care or transfers.   Instruct in proper use of assistive devices.                  Learning Progress Summary             Patient Acceptance, E, VU,NR by LIVIER at 1/16/2024 1109    Comment: reason for consult, noted deficits, discharge recommendations, bed mobilty, transfers safety, waiting for belt and wx for transfer   Family Acceptance, E, VU,NR by LIVIER at 1/16/2024 1109    Comment: reason for consult, noted deficits, discharge recommendations, bed mobilty, transfers safety, waiting for belt and wx for transfer                         Point: Home exercise program (Not Started)       Description:   Instruct learner(s) on appropriate technique for monitoring, assisting and/or progressing therapeutic exercises/activities.                  Learner Progress:  Not documented in this visit.              Point: Precautions (Done)       Description:   Instruct learner(s) on prescribed precautions during self-care and functional transfers.                  Learning Progress Summary             Patient Acceptance, E, VU,NR by LIVIER at 1/16/2024 1109    Comment: reason for consult, noted deficits, discharge recommendations, bed mobilty, transfers safety, waiting for belt and wx for transfer   Family Acceptance, E, VU,NR by LIVIER at 1/16/2024 1109    Comment: reason for consult, noted deficits, discharge recommendations, bed mobilty, transfers safety, waiting for belt and wx for transfer                         Point: Body mechanics (Done)       Description:   Instruct learner(s) on proper positioning and spine alignment during self-care, functional mobility activities and/or exercises.                  Learning Progress Summary             Patient Acceptance, E, VU,NR by LIVIER at 1/16/2024 1109    Comment: reason for consult, noted deficits, discharge  recommendations, bed mobilty, transfers safety, waiting for belt and wx for transfer   Family Acceptance, E, VU,NR by LIVIER at 1/16/2024 1105    Comment: reason for consult, noted deficits, discharge recommendations, bed mobilty, transfers safety, waiting for belt and wx for transfer                                         User Key       Initials Effective Dates Name Provider Type Discipline    LIVIER 07/11/23 -  Chayito Josue, OT Occupational Therapist OT                  OT Recommendation and Plan  Planned Therapy Interventions (OT): activity tolerance training, BADL retraining, ROM/therapeutic exercise, transfer/mobility retraining, strengthening exercise, functional balance retraining, cognitive/visual perception retraining, occupation/activity based interventions, patient/caregiver education/training  Therapy Frequency (OT): daily  Plan of Care Review  Plan of Care Reviewed With: patient, spouse  Progress: declining  Outcome Evaluation: Pt. needed increased assist with bed mobility and transfers today as compared to yesterday with PT evaluation.  Pt. with posterior and left lean with narrow SHANELLE on stand needing max A of 2 to stand.  Pt. presents with imparied strength, ROM, balance, cognition and endurance impacting PLOF ADLs and related transfers.  Typically wife can get pt. OOB to a w/c on own.  Pt. appropriate for skilled OT services to address deficit areas and promote return to PLOF ADLs and related transfers.  Recommend SNF at discharge.     Time Calculation:   Evaluation Complexity (OT)  Review Occupational Profile/Medical/Therapy History Complexity: expanded/moderate complexity  Assessment, Occupational Performance/Identification of Deficit Complexity: 3-5 performance deficits  Clinical Decision Making Complexity (OT): detailed assessment/moderate complexity  Overall Complexity of Evaluation (OT): moderate complexity     Time Calculation- OT       Row Name 01/16/24 1110             Time Calculation- OT    OT  Start Time 1006  -LIVIER      OT Received On 01/16/24  -LIVIER      OT Goal Re-Cert Due Date 01/26/24  -LIVIER         Timed Charges    77960 - OT Therapeutic Activity Minutes 15  -LIVIER      19328 - OT Self Care/Mgmt Minutes 8  -LIVIER         Untimed Charges    OT Eval/Re-eval Minutes 45  -LIVIER         Total Minutes    Timed Charges Total Minutes 23  -LIVIER      Untimed Charges Total Minutes 45  -LIVIER       Total Minutes 68  -LIVIER                User Key  (r) = Recorded By, (t) = Taken By, (c) = Cosigned By      Initials Name Provider Type    Chayito Pinzon, MARIAH Occupational Therapist                  Therapy Charges for Today       Code Description Service Date Service Provider Modifiers Qty    23364754698 HC OT THERAPEUTIC ACT EA 15 MIN 1/16/2024 Chayito Josue OT GO 1    66775466290 HC OT SELF CARE/MGMT/TRAIN EA 15 MIN 1/16/2024 Chayito Josue OT GO 1    15906064979 HC OT EVAL MOD COMPLEXITY 3 1/16/2024 Chayito Josue OT GO 1                 Chayito Josue OT  1/16/2024   English

## 2024-06-19 NOTE — ED STATDOCS - OBJECTIVE STATEMENT
Pertinent HPI/PMH/PSH/FHx/SHx and Review of Systems contained within  HPI: 58 yo female p/w CC intermittent abd pain x last night, constant since this morning. Pt was recently seen in [place] a few days ago and diagnosed with a sterocolo colitis. Pt was admitted but decided not to get surgery since she did not feel pain. Pt was discharged but told to return if her pain returns. Yesterday, the abd pain started again and resolved. This morning pain has been constant. Pt also feels like her abd is more distended. Denies vomiting.   PMH/PSH relevant for: Hashimoto's, Herniated nucleus pulposus, HTN, HLD   ROS negative for: fever, Chest pain, SOB, Nausea, vomiting, diarrhea, dysuria    FamilyHx and SocialHx not otherwise contributory Pertinent HPI/PMH/PSH/FHx/SHx and Review of Systems contained within  HPI: 60 yo female p/w CC intermittent abd pain x last night, constant since this morning. Pt was recently seen in [facility] 6 days ago and diagnosed with stercoral colitis, had endoscopy showing sigmoid structure with incomplete disimpaction, pt was offered surgical intervention but pt refused. Pt was discharged but told to return if her pain returns. Yesterday, the abd pain started again and resolved. This morning pain has been constant. Pt also feels like her abd is more distended. Denies vomiting.   PMH/PSH relevant for: Hashimoto's, Herniated nucleus pulposus, HTN, HLD   ROS negative for: fever, Chest pain, SOB, Nausea, vomiting, diarrhea, dysuria    FamilyHx and SocialHx not otherwise contributory Home

## 2024-09-26 NOTE — ED ADULT NURSE NOTE - OBJECTIVE STATEMENT
DATE:  9/26/2024     TIME OF RECEIPT FROM LAB:  0830    ORDERING PROVIDER: Dr. Mario Sandhu    LAB TEST:  Hemoglobin    LAB VALUE:  7.7        
Patient comes in with constipation and intermittent diarrhea. Patient endorses abdominal pain. patient states she has been febrile x 2 days. no signs of acute distress noted.

## 2025-03-15 ENCOUNTER — EMERGENCY (EMERGENCY)
Facility: HOSPITAL | Age: 65
LOS: 0 days | Discharge: HOME | End: 2025-03-15
Attending: STUDENT IN AN ORGANIZED HEALTH CARE EDUCATION/TRAINING PROGRAM
Payer: COMMERCIAL

## 2025-03-15 VITALS
DIASTOLIC BLOOD PRESSURE: 86 MMHG | HEART RATE: 75 BPM | OXYGEN SATURATION: 100 % | TEMPERATURE: 98 F | RESPIRATION RATE: 19 BRPM | SYSTOLIC BLOOD PRESSURE: 152 MMHG

## 2025-03-15 VITALS
HEART RATE: 93 BPM | DIASTOLIC BLOOD PRESSURE: 112 MMHG | HEIGHT: 64 IN | OXYGEN SATURATION: 100 % | SYSTOLIC BLOOD PRESSURE: 151 MMHG | RESPIRATION RATE: 20 BRPM

## 2025-03-15 DIAGNOSIS — M81.0 AGE-RELATED OSTEOPOROSIS WITHOUT CURRENT PATHOLOGICAL FRACTURE: ICD-10-CM

## 2025-03-15 DIAGNOSIS — I10 ESSENTIAL (PRIMARY) HYPERTENSION: ICD-10-CM

## 2025-03-15 DIAGNOSIS — E78.5 HYPERLIPIDEMIA, UNSPECIFIED: ICD-10-CM

## 2025-03-15 DIAGNOSIS — R06.02 SHORTNESS OF BREATH: ICD-10-CM

## 2025-03-15 DIAGNOSIS — Z88.0 ALLERGY STATUS TO PENICILLIN: ICD-10-CM

## 2025-03-15 DIAGNOSIS — R20.2 PARESTHESIA OF SKIN: ICD-10-CM

## 2025-03-15 DIAGNOSIS — Z96.641 PRESENCE OF RIGHT ARTIFICIAL HIP JOINT: Chronic | ICD-10-CM

## 2025-03-15 DIAGNOSIS — R07.9 CHEST PAIN, UNSPECIFIED: ICD-10-CM

## 2025-03-15 DIAGNOSIS — R73.03 PREDIABETES: ICD-10-CM

## 2025-03-15 DIAGNOSIS — Z98.890 OTHER SPECIFIED POSTPROCEDURAL STATES: Chronic | ICD-10-CM

## 2025-03-15 DIAGNOSIS — Z90.89 ACQUIRED ABSENCE OF OTHER ORGANS: Chronic | ICD-10-CM

## 2025-03-15 DIAGNOSIS — Z91.041 RADIOGRAPHIC DYE ALLERGY STATUS: ICD-10-CM

## 2025-03-15 DIAGNOSIS — Z98.1 ARTHRODESIS STATUS: Chronic | ICD-10-CM

## 2025-03-15 DIAGNOSIS — Z88.8 ALLERGY STATUS TO OTHER DRUGS, MEDICAMENTS AND BIOLOGICAL SUBSTANCES: ICD-10-CM

## 2025-03-15 DIAGNOSIS — E06.3 AUTOIMMUNE THYROIDITIS: ICD-10-CM

## 2025-03-15 DIAGNOSIS — Z91.048 OTHER NONMEDICINAL SUBSTANCE ALLERGY STATUS: ICD-10-CM

## 2025-03-15 DIAGNOSIS — Z96.642 PRESENCE OF LEFT ARTIFICIAL HIP JOINT: Chronic | ICD-10-CM

## 2025-03-15 DIAGNOSIS — Z98.51 TUBAL LIGATION STATUS: Chronic | ICD-10-CM

## 2025-03-15 PROBLEM — H33.312 HORSESHOE TEAR OF RETINA WITHOUT DETACHMENT, LEFT EYE: Chronic | Status: ACTIVE | Noted: 2023-06-21

## 2025-03-15 PROBLEM — K59.03 DRUG INDUCED CONSTIPATION: Chronic | Status: ACTIVE | Noted: 2023-06-21

## 2025-03-15 PROBLEM — F32.89 OTHER SPECIFIED DEPRESSIVE EPISODES: Chronic | Status: ACTIVE | Noted: 2023-06-21

## 2025-03-15 PROBLEM — M96.1 POSTLAMINECTOMY SYNDROME, NOT ELSEWHERE CLASSIFIED: Chronic | Status: ACTIVE | Noted: 2023-06-21

## 2025-03-15 PROBLEM — M47.816 SPONDYLOSIS WITHOUT MYELOPATHY OR RADICULOPATHY, LUMBAR REGION: Chronic | Status: ACTIVE | Noted: 2023-06-21

## 2025-03-15 PROBLEM — F11.20 OPIOID DEPENDENCE, UNCOMPLICATED: Chronic | Status: ACTIVE | Noted: 2023-06-21

## 2025-03-15 LAB
ALBUMIN SERPL ELPH-MCNC: 4.5 G/DL — SIGNIFICANT CHANGE UP (ref 3.3–5)
ALP SERPL-CCNC: 81 U/L — SIGNIFICANT CHANGE UP (ref 40–120)
ALT FLD-CCNC: 33 U/L — SIGNIFICANT CHANGE UP (ref 12–78)
ANION GAP SERPL CALC-SCNC: 10 MMOL/L — SIGNIFICANT CHANGE UP (ref 5–17)
APTT BLD: 29.6 SEC — SIGNIFICANT CHANGE UP (ref 24.5–35.6)
AST SERPL-CCNC: 27 U/L — SIGNIFICANT CHANGE UP (ref 15–37)
BASOPHILS # BLD AUTO: 0.06 K/UL — SIGNIFICANT CHANGE UP (ref 0–0.2)
BASOPHILS NFR BLD AUTO: 0.7 % — SIGNIFICANT CHANGE UP (ref 0–2)
BILIRUB SERPL-MCNC: 0.5 MG/DL — SIGNIFICANT CHANGE UP (ref 0.2–1.2)
BUN SERPL-MCNC: 15 MG/DL — SIGNIFICANT CHANGE UP (ref 7–23)
CALCIUM SERPL-MCNC: 9.9 MG/DL — SIGNIFICANT CHANGE UP (ref 8.5–10.1)
CHLORIDE SERPL-SCNC: 103 MMOL/L — SIGNIFICANT CHANGE UP (ref 96–108)
CO2 SERPL-SCNC: 23 MMOL/L — SIGNIFICANT CHANGE UP (ref 22–31)
CREAT SERPL-MCNC: 0.73 MG/DL — SIGNIFICANT CHANGE UP (ref 0.5–1.3)
D DIMER BLD IA.RAPID-MCNC: 238 NG/ML DDU — HIGH
EGFR: 92 ML/MIN/1.73M2 — SIGNIFICANT CHANGE UP
EGFR: 92 ML/MIN/1.73M2 — SIGNIFICANT CHANGE UP
EOSINOPHIL # BLD AUTO: 0.08 K/UL — SIGNIFICANT CHANGE UP (ref 0–0.5)
EOSINOPHIL NFR BLD AUTO: 0.9 % — SIGNIFICANT CHANGE UP (ref 0–6)
FLUAV AG NPH QL: SIGNIFICANT CHANGE UP
FLUBV AG NPH QL: SIGNIFICANT CHANGE UP
GLUCOSE SERPL-MCNC: 75 MG/DL — SIGNIFICANT CHANGE UP (ref 70–99)
HCT VFR BLD CALC: 40.3 % — SIGNIFICANT CHANGE UP (ref 34.5–45)
HGB BLD-MCNC: 13.9 G/DL — SIGNIFICANT CHANGE UP (ref 11.5–15.5)
IMM GRANULOCYTES # BLD AUTO: 0.03 K/UL — SIGNIFICANT CHANGE UP (ref 0–0.07)
IMM GRANULOCYTES NFR BLD AUTO: 0.3 % — SIGNIFICANT CHANGE UP (ref 0–0.9)
INR BLD: 0.96 RATIO — SIGNIFICANT CHANGE UP (ref 0.85–1.16)
LYMPHOCYTES # BLD AUTO: 3.26 K/UL — SIGNIFICANT CHANGE UP (ref 1–3.3)
LYMPHOCYTES NFR BLD AUTO: 35.7 % — SIGNIFICANT CHANGE UP (ref 13–44)
MAGNESIUM SERPL-MCNC: 2.3 MG/DL — SIGNIFICANT CHANGE UP (ref 1.6–2.6)
MCHC RBC-ENTMCNC: 31.3 PG — SIGNIFICANT CHANGE UP (ref 27–34)
MCHC RBC-ENTMCNC: 34.5 G/DL — SIGNIFICANT CHANGE UP (ref 32–36)
MCV RBC AUTO: 90.8 FL — SIGNIFICANT CHANGE UP (ref 80–100)
MONOCYTES # BLD AUTO: 0.84 K/UL — SIGNIFICANT CHANGE UP (ref 0–0.9)
MONOCYTES NFR BLD AUTO: 9.2 % — SIGNIFICANT CHANGE UP (ref 2–14)
NEUTROPHILS # BLD AUTO: 4.86 K/UL — SIGNIFICANT CHANGE UP (ref 1.8–7.4)
NEUTROPHILS NFR BLD AUTO: 53.2 % — SIGNIFICANT CHANGE UP (ref 43–77)
NRBC # BLD AUTO: 0 K/UL — SIGNIFICANT CHANGE UP (ref 0–0)
NRBC # FLD: 0 K/UL — SIGNIFICANT CHANGE UP (ref 0–0)
NRBC BLD AUTO-RTO: 0 /100 WBCS — SIGNIFICANT CHANGE UP (ref 0–0)
NT-PROBNP SERPL-SCNC: 20 PG/ML — SIGNIFICANT CHANGE UP (ref 0–125)
PLATELET # BLD AUTO: 442 K/UL — HIGH (ref 150–400)
PMV BLD: 9.5 FL — SIGNIFICANT CHANGE UP (ref 7–13)
POTASSIUM SERPL-MCNC: 3.5 MMOL/L — SIGNIFICANT CHANGE UP (ref 3.5–5.3)
POTASSIUM SERPL-SCNC: 3.5 MMOL/L — SIGNIFICANT CHANGE UP (ref 3.5–5.3)
PROT SERPL-MCNC: 8.5 GM/DL — HIGH (ref 6–8.3)
PROTHROM AB SERPL-ACNC: 11.1 SEC — SIGNIFICANT CHANGE UP (ref 9.9–13.4)
RBC # BLD: 4.44 M/UL — SIGNIFICANT CHANGE UP (ref 3.8–5.2)
RBC # FLD: 13 % — SIGNIFICANT CHANGE UP (ref 10.3–14.5)
RSV RNA NPH QL NAA+NON-PROBE: SIGNIFICANT CHANGE UP
SARS-COV-2 RNA SPEC QL NAA+PROBE: SIGNIFICANT CHANGE UP
SODIUM SERPL-SCNC: 136 MMOL/L — SIGNIFICANT CHANGE UP (ref 135–145)
TROPONIN I, HIGH SENSITIVITY RESULT: 8.89 NG/L — SIGNIFICANT CHANGE UP
WBC # BLD: 9.13 K/UL — SIGNIFICANT CHANGE UP (ref 3.8–10.5)
WBC # FLD AUTO: 9.13 K/UL — SIGNIFICANT CHANGE UP (ref 3.8–10.5)

## 2025-03-15 PROCEDURE — 36000 PLACE NEEDLE IN VEIN: CPT

## 2025-03-15 PROCEDURE — 71045 X-RAY EXAM CHEST 1 VIEW: CPT

## 2025-03-15 PROCEDURE — 99285 EMERGENCY DEPT VISIT HI MDM: CPT

## 2025-03-15 PROCEDURE — 85610 PROTHROMBIN TIME: CPT

## 2025-03-15 PROCEDURE — 0241U: CPT

## 2025-03-15 PROCEDURE — 93005 ELECTROCARDIOGRAM TRACING: CPT

## 2025-03-15 PROCEDURE — 85025 COMPLETE CBC W/AUTO DIFF WBC: CPT

## 2025-03-15 PROCEDURE — 83880 ASSAY OF NATRIURETIC PEPTIDE: CPT

## 2025-03-15 PROCEDURE — 71045 X-RAY EXAM CHEST 1 VIEW: CPT | Mod: 26

## 2025-03-15 PROCEDURE — 83735 ASSAY OF MAGNESIUM: CPT

## 2025-03-15 PROCEDURE — 36415 COLL VENOUS BLD VENIPUNCTURE: CPT

## 2025-03-15 PROCEDURE — 85379 FIBRIN DEGRADATION QUANT: CPT

## 2025-03-15 PROCEDURE — 85730 THROMBOPLASTIN TIME PARTIAL: CPT

## 2025-03-15 PROCEDURE — 84484 ASSAY OF TROPONIN QUANT: CPT

## 2025-03-15 PROCEDURE — 80053 COMPREHEN METABOLIC PANEL: CPT

## 2025-03-15 PROCEDURE — 93010 ELECTROCARDIOGRAM REPORT: CPT

## 2025-03-15 PROCEDURE — 99285 EMERGENCY DEPT VISIT HI MDM: CPT | Mod: 25
